# Patient Record
Sex: MALE | Race: WHITE | NOT HISPANIC OR LATINO
[De-identification: names, ages, dates, MRNs, and addresses within clinical notes are randomized per-mention and may not be internally consistent; named-entity substitution may affect disease eponyms.]

---

## 2017-06-12 ENCOUNTER — APPOINTMENT (OUTPATIENT)
Dept: CARDIOLOGY | Facility: CLINIC | Age: 67
End: 2017-06-12

## 2017-06-12 ENCOUNTER — NON-APPOINTMENT (OUTPATIENT)
Age: 67
End: 2017-06-12

## 2017-06-12 VITALS
BODY MASS INDEX: 30.48 KG/M2 | HEART RATE: 62 BPM | HEIGHT: 72 IN | WEIGHT: 225 LBS | SYSTOLIC BLOOD PRESSURE: 126 MMHG | RESPIRATION RATE: 14 BRPM | DIASTOLIC BLOOD PRESSURE: 62 MMHG

## 2017-06-13 LAB
ALBUMIN SERPL ELPH-MCNC: 4.7 G/DL
ALP BLD-CCNC: 37 U/L
ALT SERPL-CCNC: 20 U/L
ANION GAP SERPL CALC-SCNC: 15 MMOL/L
AST SERPL-CCNC: 17 U/L
BASOPHILS # BLD AUTO: 0.01 K/UL
BASOPHILS NFR BLD AUTO: 0.1 %
BILIRUB SERPL-MCNC: 0.5 MG/DL
BUN SERPL-MCNC: 20 MG/DL
CALCIUM SERPL-MCNC: 9.4 MG/DL
CHLORIDE SERPL-SCNC: 104 MMOL/L
CHOLEST SERPL-MCNC: 172 MG/DL
CHOLEST/HDLC SERPL: 3.7 RATIO
CO2 SERPL-SCNC: 23 MMOL/L
CREAT SERPL-MCNC: 0.95 MG/DL
EOSINOPHIL # BLD AUTO: 0.12 K/UL
EOSINOPHIL NFR BLD AUTO: 1.5 %
GLUCOSE SERPL-MCNC: 89 MG/DL
HCT VFR BLD CALC: 43 %
HDLC SERPL-MCNC: 47 MG/DL
HGB BLD-MCNC: 14.3 G/DL
IMM GRANULOCYTES NFR BLD AUTO: 0 %
LDLC SERPL CALC-MCNC: 86 MG/DL
LDLC SERPL DIRECT ASSAY-MCNC: 106 MG/DL
LYMPHOCYTES # BLD AUTO: 2.5 K/UL
LYMPHOCYTES NFR BLD AUTO: 30.3 %
MAN DIFF?: NORMAL
MCHC RBC-ENTMCNC: 30.9 PG
MCHC RBC-ENTMCNC: 33.3 GM/DL
MCV RBC AUTO: 92.9 FL
MONOCYTES # BLD AUTO: 0.69 K/UL
MONOCYTES NFR BLD AUTO: 8.4 %
NEUTROPHILS # BLD AUTO: 4.93 K/UL
NEUTROPHILS NFR BLD AUTO: 59.7 %
PLATELET # BLD AUTO: 222 K/UL
POTASSIUM SERPL-SCNC: 4 MMOL/L
PROT SERPL-MCNC: 6.9 G/DL
RBC # BLD: 4.63 M/UL
RBC # FLD: 13.9 %
SODIUM SERPL-SCNC: 142 MMOL/L
TRIGL SERPL-MCNC: 193 MG/DL
WBC # FLD AUTO: 8.25 K/UL

## 2017-06-30 ENCOUNTER — APPOINTMENT (OUTPATIENT)
Dept: CARDIOTHORACIC SURGERY | Facility: CLINIC | Age: 67
End: 2017-06-30

## 2017-06-30 VITALS
DIASTOLIC BLOOD PRESSURE: 82 MMHG | RESPIRATION RATE: 14 BRPM | TEMPERATURE: 97.7 F | SYSTOLIC BLOOD PRESSURE: 161 MMHG | HEART RATE: 80 BPM | OXYGEN SATURATION: 96 %

## 2017-06-30 VITALS — WEIGHT: 222 LBS | HEIGHT: 72 IN | BODY MASS INDEX: 30.07 KG/M2

## 2017-08-25 ENCOUNTER — APPOINTMENT (OUTPATIENT)
Dept: CARDIOLOGY | Facility: CLINIC | Age: 67
End: 2017-08-25

## 2017-10-26 ENCOUNTER — APPOINTMENT (OUTPATIENT)
Dept: CARDIOLOGY | Facility: CLINIC | Age: 67
End: 2017-10-26
Payer: COMMERCIAL

## 2017-10-26 ENCOUNTER — NON-APPOINTMENT (OUTPATIENT)
Age: 67
End: 2017-10-26

## 2017-10-26 VITALS
HEIGHT: 72 IN | TEMPERATURE: 98.3 F | BODY MASS INDEX: 31.02 KG/M2 | HEART RATE: 61 BPM | OXYGEN SATURATION: 96 % | SYSTOLIC BLOOD PRESSURE: 160 MMHG | DIASTOLIC BLOOD PRESSURE: 75 MMHG | WEIGHT: 229 LBS

## 2017-10-26 PROCEDURE — 93000 ELECTROCARDIOGRAM COMPLETE: CPT

## 2017-10-26 PROCEDURE — 90471 IMMUNIZATION ADMIN: CPT

## 2017-10-26 PROCEDURE — 90662 IIV NO PRSV INCREASED AG IM: CPT

## 2017-10-26 PROCEDURE — 36415 COLL VENOUS BLD VENIPUNCTURE: CPT

## 2017-10-26 PROCEDURE — 99215 OFFICE O/P EST HI 40 MIN: CPT

## 2017-10-27 LAB
ALBUMIN SERPL ELPH-MCNC: 4.4 G/DL
ALP BLD-CCNC: 41 U/L
ALT SERPL-CCNC: 20 U/L
ANION GAP SERPL CALC-SCNC: 11 MMOL/L
AST SERPL-CCNC: 22 U/L
BASOPHILS # BLD AUTO: 0.02 K/UL
BASOPHILS NFR BLD AUTO: 0.2 %
BILIRUB SERPL-MCNC: 0.4 MG/DL
BUN SERPL-MCNC: 20 MG/DL
CALCIUM SERPL-MCNC: 9.6 MG/DL
CHLORIDE SERPL-SCNC: 101 MMOL/L
CHOLEST SERPL-MCNC: 154 MG/DL
CHOLEST/HDLC SERPL: 4.5 RATIO
CO2 SERPL-SCNC: 29 MMOL/L
CREAT SERPL-MCNC: 0.76 MG/DL
EOSINOPHIL # BLD AUTO: 0.19 K/UL
EOSINOPHIL NFR BLD AUTO: 2.1 %
GLUCOSE SERPL-MCNC: 93 MG/DL
HCT VFR BLD CALC: 45 %
HDLC SERPL-MCNC: 34 MG/DL
HGB BLD-MCNC: 14.9 G/DL
IMM GRANULOCYTES NFR BLD AUTO: 0.1 %
LDLC SERPL CALC-MCNC: 84 MG/DL
LDLC SERPL DIRECT ASSAY-MCNC: 90 MG/DL
LYMPHOCYTES # BLD AUTO: 2.83 K/UL
LYMPHOCYTES NFR BLD AUTO: 31.1 %
MAN DIFF?: NORMAL
MCHC RBC-ENTMCNC: 30.5 PG
MCHC RBC-ENTMCNC: 33.1 GM/DL
MCV RBC AUTO: 92.2 FL
MONOCYTES # BLD AUTO: 0.73 K/UL
MONOCYTES NFR BLD AUTO: 8 %
NEUTROPHILS # BLD AUTO: 5.33 K/UL
NEUTROPHILS NFR BLD AUTO: 58.5 %
PLATELET # BLD AUTO: 248 K/UL
POTASSIUM SERPL-SCNC: 5 MMOL/L
PROT SERPL-MCNC: 7.9 G/DL
RBC # BLD: 4.88 M/UL
RBC # FLD: 13.5 %
SODIUM SERPL-SCNC: 141 MMOL/L
TRIGL SERPL-MCNC: 181 MG/DL
WBC # FLD AUTO: 9.11 K/UL

## 2018-01-03 ENCOUNTER — APPOINTMENT (OUTPATIENT)
Dept: CT IMAGING | Facility: IMAGING CENTER | Age: 68
End: 2018-01-03
Payer: COMMERCIAL

## 2018-01-03 ENCOUNTER — OUTPATIENT (OUTPATIENT)
Dept: OUTPATIENT SERVICES | Facility: HOSPITAL | Age: 68
LOS: 1 days | End: 2018-01-03
Payer: COMMERCIAL

## 2018-01-03 DIAGNOSIS — I71.2 THORACIC AORTIC ANEURYSM, WITHOUT RUPTURE: ICD-10-CM

## 2018-01-03 PROCEDURE — 71275 CT ANGIOGRAPHY CHEST: CPT

## 2018-01-03 PROCEDURE — 82565 ASSAY OF CREATININE: CPT

## 2018-01-03 PROCEDURE — 71275 CT ANGIOGRAPHY CHEST: CPT | Mod: 26

## 2018-01-17 ENCOUNTER — OUTPATIENT (OUTPATIENT)
Dept: OUTPATIENT SERVICES | Facility: HOSPITAL | Age: 68
LOS: 1 days | End: 2018-01-17
Payer: COMMERCIAL

## 2018-01-17 VITALS
TEMPERATURE: 98 F | HEIGHT: 70 IN | WEIGHT: 231.04 LBS | OXYGEN SATURATION: 98 % | HEART RATE: 56 BPM | SYSTOLIC BLOOD PRESSURE: 158 MMHG | RESPIRATION RATE: 16 BRPM | DIASTOLIC BLOOD PRESSURE: 79 MMHG

## 2018-01-17 DIAGNOSIS — I35.0 NONRHEUMATIC AORTIC (VALVE) STENOSIS: ICD-10-CM

## 2018-01-17 DIAGNOSIS — Z01.818 ENCOUNTER FOR OTHER PREPROCEDURAL EXAMINATION: ICD-10-CM

## 2018-01-17 DIAGNOSIS — I10 ESSENTIAL (PRIMARY) HYPERTENSION: ICD-10-CM

## 2018-01-17 DIAGNOSIS — E78.5 HYPERLIPIDEMIA, UNSPECIFIED: ICD-10-CM

## 2018-01-17 LAB
BLD GP AB SCN SERPL QL: NEGATIVE — SIGNIFICANT CHANGE UP
RH IG SCN BLD-IMP: NEGATIVE — SIGNIFICANT CHANGE UP

## 2018-01-17 PROCEDURE — 80053 COMPREHEN METABOLIC PANEL: CPT

## 2018-01-17 PROCEDURE — 86901 BLOOD TYPING SEROLOGIC RH(D): CPT

## 2018-01-17 PROCEDURE — G0463: CPT

## 2018-01-17 PROCEDURE — 87641 MR-STAPH DNA AMP PROBE: CPT

## 2018-01-17 PROCEDURE — 93005 ELECTROCARDIOGRAM TRACING: CPT

## 2018-01-17 PROCEDURE — 83036 HEMOGLOBIN GLYCOSYLATED A1C: CPT

## 2018-01-17 PROCEDURE — 87640 STAPH A DNA AMP PROBE: CPT

## 2018-01-17 PROCEDURE — 86900 BLOOD TYPING SEROLOGIC ABO: CPT

## 2018-01-17 PROCEDURE — 86850 RBC ANTIBODY SCREEN: CPT

## 2018-01-17 PROCEDURE — 71046 X-RAY EXAM CHEST 2 VIEWS: CPT

## 2018-01-17 PROCEDURE — 93010 ELECTROCARDIOGRAM REPORT: CPT

## 2018-01-17 PROCEDURE — 71046 X-RAY EXAM CHEST 2 VIEWS: CPT | Mod: 26

## 2018-01-17 PROCEDURE — 85027 COMPLETE CBC AUTOMATED: CPT

## 2018-01-17 NOTE — H&P PST ADULT - PMH
Aneurysm of ascending aorta  being by PMD 10 years ago size stays same as per patient  Aortic valve insufficiency, unspecified etiology  scheduled for surgery  Colon polyp    Essential hypertension    Hyperlipidemia, unspecified hyperlipidemia type    LVE (left ventricular enlargement)

## 2018-01-17 NOTE — H&P PST ADULT - PROBLEM SELECTOR PLAN 1
aortic valve replacement   Pst instruction given with antibacterial soap 3 days preop , patient and patient wife verbalizes understanding, incentive spirometer given to practice preop

## 2018-01-17 NOTE — H&P PST ADULT - NSANTHOSAYNRD_GEN_A_CORE
No. DAISY screening performed.  STOP BANG Legend: 0-2 = LOW Risk; 3-4 = INTERMEDIATE Risk; 5-8 = HIGH Risk

## 2018-01-17 NOTE — H&P PST ADULT - HISTORY OF PRESENT ILLNESS
65 y/o male PMH HTN, HLD, aortic valve regurgitation (mod to severe), aortic root dilatation, former smoker, Came for PST today for aortic valve replacement.. Pt s has history of aortic aneurysm and was being follow-up by  cardiologist and recently was advised to undergo surgery for prevention of  further complication  of  valve disorder

## 2018-01-18 ENCOUNTER — CLINICAL ADVICE (OUTPATIENT)
Age: 68
End: 2018-01-18

## 2018-01-18 LAB
ALBUMIN SERPL ELPH-MCNC: 4.4 G/DL — SIGNIFICANT CHANGE UP (ref 3.3–5)
ALP SERPL-CCNC: 40 U/L — SIGNIFICANT CHANGE UP (ref 40–120)
ALT FLD-CCNC: 18 U/L — SIGNIFICANT CHANGE UP (ref 10–45)
ANION GAP SERPL CALC-SCNC: 12 MMOL/L — SIGNIFICANT CHANGE UP (ref 5–17)
AST SERPL-CCNC: 21 U/L — SIGNIFICANT CHANGE UP (ref 10–40)
BILIRUB SERPL-MCNC: 0.4 MG/DL — SIGNIFICANT CHANGE UP (ref 0.2–1.2)
BUN SERPL-MCNC: 16 MG/DL — SIGNIFICANT CHANGE UP (ref 7–23)
CALCIUM SERPL-MCNC: 9.2 MG/DL — SIGNIFICANT CHANGE UP (ref 8.4–10.5)
CHLORIDE SERPL-SCNC: 101 MMOL/L — SIGNIFICANT CHANGE UP (ref 96–108)
CO2 SERPL-SCNC: 29 MMOL/L — SIGNIFICANT CHANGE UP (ref 22–31)
CREAT SERPL-MCNC: 0.72 MG/DL — SIGNIFICANT CHANGE UP (ref 0.5–1.3)
GLUCOSE SERPL-MCNC: 84 MG/DL — SIGNIFICANT CHANGE UP (ref 70–99)
HBA1C BLD-MCNC: 5.8 % — HIGH (ref 4–5.6)
HCT VFR BLD CALC: 44.3 % — SIGNIFICANT CHANGE UP (ref 39–50)
HGB BLD-MCNC: 14.6 G/DL — SIGNIFICANT CHANGE UP (ref 13–17)
MCHC RBC-ENTMCNC: 30.2 PG — SIGNIFICANT CHANGE UP (ref 27–34)
MCHC RBC-ENTMCNC: 33 GM/DL — SIGNIFICANT CHANGE UP (ref 32–36)
MCV RBC AUTO: 91.5 FL — SIGNIFICANT CHANGE UP (ref 80–100)
MRSA PCR RESULT.: SIGNIFICANT CHANGE UP
PLATELET # BLD AUTO: 220 K/UL — SIGNIFICANT CHANGE UP (ref 150–400)
POTASSIUM SERPL-MCNC: 4.3 MMOL/L — SIGNIFICANT CHANGE UP (ref 3.5–5.3)
POTASSIUM SERPL-SCNC: 4.3 MMOL/L — SIGNIFICANT CHANGE UP (ref 3.5–5.3)
PROT SERPL-MCNC: 7.8 G/DL — SIGNIFICANT CHANGE UP (ref 6–8.3)
RBC # BLD: 4.84 M/UL — SIGNIFICANT CHANGE UP (ref 4.2–5.8)
RBC # FLD: 13.3 % — SIGNIFICANT CHANGE UP (ref 10.3–14.5)
S AUREUS DNA NOSE QL NAA+PROBE: DETECTED
SODIUM SERPL-SCNC: 142 MMOL/L — SIGNIFICANT CHANGE UP (ref 135–145)
WBC # BLD: 8.59 K/UL — SIGNIFICANT CHANGE UP (ref 3.8–10.5)
WBC # FLD AUTO: 8.59 K/UL — SIGNIFICANT CHANGE UP (ref 3.8–10.5)

## 2018-01-22 ENCOUNTER — APPOINTMENT (OUTPATIENT)
Dept: CARDIOTHORACIC SURGERY | Facility: HOSPITAL | Age: 68
End: 2018-01-22
Payer: COMMERCIAL

## 2018-01-22 ENCOUNTER — RESULT REVIEW (OUTPATIENT)
Age: 68
End: 2018-01-22

## 2018-01-22 ENCOUNTER — INPATIENT (INPATIENT)
Facility: HOSPITAL | Age: 68
LOS: 4 days | Discharge: HOME HEALTH | DRG: 221 | End: 2018-01-27
Attending: THORACIC SURGERY (CARDIOTHORACIC VASCULAR SURGERY) | Admitting: THORACIC SURGERY (CARDIOTHORACIC VASCULAR SURGERY)
Payer: COMMERCIAL

## 2018-01-22 VITALS
HEART RATE: 59 BPM | TEMPERATURE: 98 F | DIASTOLIC BLOOD PRESSURE: 71 MMHG | SYSTOLIC BLOOD PRESSURE: 179 MMHG | OXYGEN SATURATION: 97 % | RESPIRATION RATE: 16 BRPM | WEIGHT: 227.08 LBS | HEIGHT: 70 IN

## 2018-01-22 DIAGNOSIS — I35.0 NONRHEUMATIC AORTIC (VALVE) STENOSIS: ICD-10-CM

## 2018-01-22 LAB
ALBUMIN SERPL ELPH-MCNC: 3.7 G/DL — SIGNIFICANT CHANGE UP (ref 3.3–5)
ALP SERPL-CCNC: 25 U/L — LOW (ref 40–120)
ALT FLD-CCNC: 14 U/L RC — SIGNIFICANT CHANGE UP (ref 10–45)
ANION GAP SERPL CALC-SCNC: 11 MMOL/L — SIGNIFICANT CHANGE UP (ref 5–17)
APTT BLD: 32.3 SEC — SIGNIFICANT CHANGE UP (ref 27.5–37.4)
AST SERPL-CCNC: 30 U/L — SIGNIFICANT CHANGE UP (ref 10–40)
BASOPHILS # BLD AUTO: 0 K/UL — SIGNIFICANT CHANGE UP (ref 0–0.2)
BASOPHILS NFR BLD AUTO: 0 % — SIGNIFICANT CHANGE UP (ref 0–2)
BILIRUB SERPL-MCNC: 0.8 MG/DL — SIGNIFICANT CHANGE UP (ref 0.2–1.2)
BUN SERPL-MCNC: 18 MG/DL — SIGNIFICANT CHANGE UP (ref 7–23)
CALCIUM SERPL-MCNC: 8.4 MG/DL — SIGNIFICANT CHANGE UP (ref 8.4–10.5)
CHLORIDE SERPL-SCNC: 107 MMOL/L — SIGNIFICANT CHANGE UP (ref 96–108)
CK MB BLD-MCNC: 11.6 % — HIGH (ref 0–3.5)
CK MB CFR SERPL CALC: 22.2 NG/ML — HIGH (ref 0–6.7)
CK SERPL-CCNC: 191 U/L — SIGNIFICANT CHANGE UP (ref 30–200)
CO2 SERPL-SCNC: 24 MMOL/L — SIGNIFICANT CHANGE UP (ref 22–31)
CREAT SERPL-MCNC: 0.86 MG/DL — SIGNIFICANT CHANGE UP (ref 0.5–1.3)
EOSINOPHIL # BLD AUTO: 0.1 K/UL — SIGNIFICANT CHANGE UP (ref 0–0.5)
EOSINOPHIL NFR BLD AUTO: 0 % — SIGNIFICANT CHANGE UP (ref 0–6)
GAS PNL BLDA: SIGNIFICANT CHANGE UP
GLUCOSE SERPL-MCNC: 142 MG/DL — HIGH (ref 70–99)
HCT VFR BLD CALC: 37.1 % — LOW (ref 39–50)
HGB BLD-MCNC: 12.6 G/DL — LOW (ref 13–17)
INR BLD: 1 RATIO — SIGNIFICANT CHANGE UP (ref 0.88–1.16)
LYMPHOCYTES # BLD AUTO: 1.7 K/UL — SIGNIFICANT CHANGE UP (ref 1–3.3)
LYMPHOCYTES # BLD AUTO: 15 % — SIGNIFICANT CHANGE UP (ref 13–44)
MCHC RBC-ENTMCNC: 31.1 PG — SIGNIFICANT CHANGE UP (ref 27–34)
MCHC RBC-ENTMCNC: 34.1 GM/DL — SIGNIFICANT CHANGE UP (ref 32–36)
MCV RBC AUTO: 91.4 FL — SIGNIFICANT CHANGE UP (ref 80–100)
MONOCYTES # BLD AUTO: 0.7 K/UL — SIGNIFICANT CHANGE UP (ref 0–0.9)
MONOCYTES NFR BLD AUTO: 6 % — SIGNIFICANT CHANGE UP (ref 2–14)
NEUTROPHILS # BLD AUTO: 10.2 K/UL — HIGH (ref 1.8–7.4)
NEUTROPHILS NFR BLD AUTO: 76 % — SIGNIFICANT CHANGE UP (ref 43–77)
NEUTS BAND # BLD: 3 % — SIGNIFICANT CHANGE UP (ref 0–8)
PLAT MORPH BLD: NORMAL — SIGNIFICANT CHANGE UP
PLATELET # BLD AUTO: 118 K/UL — LOW (ref 150–400)
POTASSIUM SERPL-MCNC: 4.1 MMOL/L — SIGNIFICANT CHANGE UP (ref 3.5–5.3)
POTASSIUM SERPL-SCNC: 4.1 MMOL/L — SIGNIFICANT CHANGE UP (ref 3.5–5.3)
PROT SERPL-MCNC: 5.7 G/DL — LOW (ref 6–8.3)
PROTHROM AB SERPL-ACNC: 10.9 SEC — SIGNIFICANT CHANGE UP (ref 9.8–12.7)
RBC # BLD: 4.06 M/UL — LOW (ref 4.2–5.8)
RBC # FLD: 11.4 % — SIGNIFICANT CHANGE UP (ref 10.3–14.5)
RBC BLD AUTO: NORMAL — SIGNIFICANT CHANGE UP
RH IG SCN BLD-IMP: NEGATIVE — SIGNIFICANT CHANGE UP
SODIUM SERPL-SCNC: 142 MMOL/L — SIGNIFICANT CHANGE UP (ref 135–145)
TROPONIN T SERPL-MCNC: 0.15 NG/ML — HIGH (ref 0–0.06)
WBC # BLD: 12.7 K/UL — HIGH (ref 3.8–10.5)
WBC # FLD AUTO: 12.7 K/UL — HIGH (ref 3.8–10.5)

## 2018-01-22 PROCEDURE — 88311 DECALCIFY TISSUE: CPT | Mod: 26

## 2018-01-22 PROCEDURE — 33863 ASCENDING AORTIC GRAFT: CPT | Mod: AS

## 2018-01-22 PROCEDURE — 88305 TISSUE EXAM BY PATHOLOGIST: CPT | Mod: 26

## 2018-01-22 PROCEDURE — 33863 ASCENDING AORTIC GRAFT: CPT

## 2018-01-22 PROCEDURE — 88304 TISSUE EXAM BY PATHOLOGIST: CPT | Mod: 26

## 2018-01-22 PROCEDURE — 71045 X-RAY EXAM CHEST 1 VIEW: CPT | Mod: 26

## 2018-01-22 PROCEDURE — 93010 ELECTROCARDIOGRAM REPORT: CPT

## 2018-01-22 RX ORDER — DOCUSATE SODIUM 100 MG
100 CAPSULE ORAL THREE TIMES A DAY
Qty: 0 | Refills: 0 | Status: DISCONTINUED | OUTPATIENT
Start: 2018-01-22 | End: 2018-01-27

## 2018-01-22 RX ORDER — OXYCODONE AND ACETAMINOPHEN 5; 325 MG/1; MG/1
1 TABLET ORAL EVERY 6 HOURS
Qty: 0 | Refills: 0 | Status: DISCONTINUED | OUTPATIENT
Start: 2018-01-22 | End: 2018-01-23

## 2018-01-22 RX ORDER — PANTOPRAZOLE SODIUM 20 MG/1
40 TABLET, DELAYED RELEASE ORAL DAILY
Qty: 0 | Refills: 0 | Status: DISCONTINUED | OUTPATIENT
Start: 2018-01-22 | End: 2018-01-23

## 2018-01-22 RX ORDER — CEFUROXIME AXETIL 250 MG
1500 TABLET ORAL EVERY 8 HOURS
Qty: 0 | Refills: 0 | Status: COMPLETED | OUTPATIENT
Start: 2018-01-22 | End: 2018-01-24

## 2018-01-22 RX ORDER — SODIUM CHLORIDE 9 MG/ML
1000 INJECTION INTRAMUSCULAR; INTRAVENOUS; SUBCUTANEOUS
Qty: 0 | Refills: 0 | Status: DISCONTINUED | OUTPATIENT
Start: 2018-01-22 | End: 2018-01-27

## 2018-01-22 RX ORDER — DEXMEDETOMIDINE HYDROCHLORIDE IN 0.9% SODIUM CHLORIDE 4 UG/ML
0.5 INJECTION INTRAVENOUS
Qty: 200 | Refills: 0 | Status: DISCONTINUED | OUTPATIENT
Start: 2018-01-22 | End: 2018-01-23

## 2018-01-22 RX ORDER — LIDOCAINE HCL 20 MG/ML
0.2 VIAL (ML) INJECTION ONCE
Qty: 0 | Refills: 0 | Status: DISCONTINUED | OUTPATIENT
Start: 2018-01-22 | End: 2018-01-22

## 2018-01-22 RX ORDER — SODIUM CHLORIDE 9 MG/ML
1000 INJECTION, SOLUTION INTRAVENOUS
Qty: 0 | Refills: 0 | Status: DISCONTINUED | OUTPATIENT
Start: 2018-01-22 | End: 2018-01-23

## 2018-01-22 RX ORDER — POTASSIUM CHLORIDE 20 MEQ
10 PACKET (EA) ORAL ONCE
Qty: 0 | Refills: 0 | Status: DISCONTINUED | OUTPATIENT
Start: 2018-01-22 | End: 2018-01-23

## 2018-01-22 RX ORDER — ASPIRIN/CALCIUM CARB/MAGNESIUM 324 MG
325 TABLET ORAL DAILY
Qty: 0 | Refills: 0 | Status: DISCONTINUED | OUTPATIENT
Start: 2018-01-22 | End: 2018-01-22

## 2018-01-22 RX ORDER — POTASSIUM CHLORIDE 20 MEQ
10 PACKET (EA) ORAL
Qty: 0 | Refills: 0 | Status: DISCONTINUED | OUTPATIENT
Start: 2018-01-22 | End: 2018-01-23

## 2018-01-22 RX ORDER — ALBUMIN HUMAN 25 %
250 VIAL (ML) INTRAVENOUS
Qty: 0 | Refills: 0 | Status: DISCONTINUED | OUTPATIENT
Start: 2018-01-22 | End: 2018-01-23

## 2018-01-22 RX ORDER — DEXTROSE 50 % IN WATER 50 %
25 SYRINGE (ML) INTRAVENOUS
Qty: 0 | Refills: 0 | Status: DISCONTINUED | OUTPATIENT
Start: 2018-01-22 | End: 2018-01-27

## 2018-01-22 RX ORDER — INSULIN HUMAN 100 [IU]/ML
2 INJECTION, SOLUTION SUBCUTANEOUS
Qty: 100 | Refills: 0 | Status: DISCONTINUED | OUTPATIENT
Start: 2018-01-22 | End: 2018-01-23

## 2018-01-22 RX ORDER — METOCLOPRAMIDE HCL 10 MG
10 TABLET ORAL EVERY 8 HOURS
Qty: 0 | Refills: 0 | Status: COMPLETED | OUTPATIENT
Start: 2018-01-22 | End: 2018-01-23

## 2018-01-22 RX ORDER — OXYCODONE AND ACETAMINOPHEN 5; 325 MG/1; MG/1
2 TABLET ORAL EVERY 6 HOURS
Qty: 0 | Refills: 0 | Status: DISCONTINUED | OUTPATIENT
Start: 2018-01-22 | End: 2018-01-23

## 2018-01-22 RX ORDER — VANCOMYCIN HCL 1 G
1000 VIAL (EA) INTRAVENOUS EVERY 12 HOURS
Qty: 0 | Refills: 0 | Status: COMPLETED | OUTPATIENT
Start: 2018-01-22 | End: 2018-01-23

## 2018-01-22 RX ORDER — CEFUROXIME AXETIL 250 MG
1500 TABLET ORAL ONCE
Qty: 0 | Refills: 0 | Status: COMPLETED | OUTPATIENT
Start: 2018-01-22 | End: 2018-01-22

## 2018-01-22 RX ORDER — DOBUTAMINE HCL 250MG/20ML
2.5 VIAL (ML) INTRAVENOUS
Qty: 500 | Refills: 0 | Status: DISCONTINUED | OUTPATIENT
Start: 2018-01-22 | End: 2018-01-23

## 2018-01-22 RX ORDER — KETOROLAC TROMETHAMINE 30 MG/ML
30 SYRINGE (ML) INJECTION EVERY 8 HOURS
Qty: 0 | Refills: 0 | Status: DISCONTINUED | OUTPATIENT
Start: 2018-01-22 | End: 2018-01-24

## 2018-01-22 RX ORDER — VANCOMYCIN HCL 1 G
1000 VIAL (EA) INTRAVENOUS EVERY 12 HOURS
Qty: 0 | Refills: 0 | Status: DISCONTINUED | OUTPATIENT
Start: 2018-01-22 | End: 2018-01-22

## 2018-01-22 RX ORDER — SODIUM CHLORIDE 9 MG/ML
500 INJECTION, SOLUTION INTRAVENOUS ONCE
Qty: 0 | Refills: 0 | Status: COMPLETED | OUTPATIENT
Start: 2018-01-22 | End: 2018-01-22

## 2018-01-22 RX ORDER — ALBUMIN HUMAN 25 %
250 VIAL (ML) INTRAVENOUS
Qty: 0 | Refills: 0 | Status: COMPLETED | OUTPATIENT
Start: 2018-01-22 | End: 2018-01-22

## 2018-01-22 RX ORDER — HYDROMORPHONE HYDROCHLORIDE 2 MG/ML
0.5 INJECTION INTRAMUSCULAR; INTRAVENOUS; SUBCUTANEOUS ONCE
Qty: 0 | Refills: 0 | Status: DISCONTINUED | OUTPATIENT
Start: 2018-01-22 | End: 2018-01-22

## 2018-01-22 RX ORDER — DEXTROSE 50 % IN WATER 50 %
50 SYRINGE (ML) INTRAVENOUS
Qty: 0 | Refills: 0 | Status: DISCONTINUED | OUTPATIENT
Start: 2018-01-22 | End: 2018-01-27

## 2018-01-22 RX ORDER — MEPERIDINE HYDROCHLORIDE 50 MG/ML
25 INJECTION INTRAMUSCULAR; INTRAVENOUS; SUBCUTANEOUS ONCE
Qty: 0 | Refills: 0 | Status: DISCONTINUED | OUTPATIENT
Start: 2018-01-22 | End: 2018-01-23

## 2018-01-22 RX ORDER — SODIUM CHLORIDE 9 MG/ML
3 INJECTION INTRAMUSCULAR; INTRAVENOUS; SUBCUTANEOUS EVERY 8 HOURS
Qty: 0 | Refills: 0 | Status: DISCONTINUED | OUTPATIENT
Start: 2018-01-22 | End: 2018-01-22

## 2018-01-22 RX ORDER — ALBUMIN HUMAN 25 %
500 VIAL (ML) INTRAVENOUS ONCE
Qty: 0 | Refills: 0 | Status: COMPLETED | OUTPATIENT
Start: 2018-01-22 | End: 2018-01-22

## 2018-01-22 RX ADMIN — Medication 500 MILLILITER(S): at 18:59

## 2018-01-22 RX ADMIN — SODIUM CHLORIDE 30 MILLILITER(S): 9 INJECTION, SOLUTION INTRAVENOUS at 16:03

## 2018-01-22 RX ADMIN — Medication 500 MILLILITER(S): at 22:45

## 2018-01-22 RX ADMIN — SODIUM CHLORIDE 3000 MILLILITER(S): 9 INJECTION, SOLUTION INTRAVENOUS at 15:00

## 2018-01-22 RX ADMIN — Medication 10 MILLIGRAM(S): at 21:31

## 2018-01-22 RX ADMIN — Medication 250 MILLIGRAM(S): at 21:31

## 2018-01-22 RX ADMIN — Medication 30 MILLIGRAM(S): at 21:15

## 2018-01-22 RX ADMIN — INSULIN HUMAN 2 UNIT(S)/HR: 100 INJECTION, SOLUTION SUBCUTANEOUS at 14:45

## 2018-01-22 RX ADMIN — Medication 7.72 MICROGRAM(S)/KG/MIN: at 21:18

## 2018-01-22 RX ADMIN — Medication 30 MILLIGRAM(S): at 21:30

## 2018-01-22 RX ADMIN — Medication 100 MILLIGRAM(S): at 20:00

## 2018-01-22 RX ADMIN — Medication 250 MILLILITER(S): at 18:22

## 2018-01-22 RX ADMIN — DEXMEDETOMIDINE HYDROCHLORIDE IN 0.9% SODIUM CHLORIDE 12.88 MICROGRAM(S)/KG/HR: 4 INJECTION INTRAVENOUS at 14:52

## 2018-01-22 RX ADMIN — DEXMEDETOMIDINE HYDROCHLORIDE IN 0.9% SODIUM CHLORIDE 12.88 MICROGRAM(S)/KG/HR: 4 INJECTION INTRAVENOUS at 21:18

## 2018-01-22 RX ADMIN — Medication 500 MILLILITER(S): at 20:30

## 2018-01-22 RX ADMIN — Medication 500 MILLILITER(S): at 22:00

## 2018-01-22 RX ADMIN — Medication 7.72 MICROGRAM(S)/KG/MIN: at 14:46

## 2018-01-22 RX ADMIN — Medication 500 MILLILITER(S): at 21:21

## 2018-01-22 NOTE — BRIEF OPERATIVE NOTE - PRE-OP DX
Aortic aneurysm without rupture, unspecified portion of aorta  01/22/2018    Active  Piter Champion  Aortic valve insufficiency, etiology of cardiac valve disease unspecified  01/22/2018    Active  Piter Champion

## 2018-01-22 NOTE — BRIEF OPERATIVE NOTE - PROCEDURE
<<-----Click on this checkbox to enter Procedure Aortic valve replacement, bioprosthetic  01/22/2018  AVR(bio) with aortic aneurysm repair  Active  [id number]  Aortic aneurysm repair  01/22/2018    Active  [id number]

## 2018-01-23 DIAGNOSIS — J95.89 OTHER POSTPROCEDURAL COMPLICATIONS AND DISORDERS OF RESPIRATORY SYSTEM, NOT ELSEWHERE CLASSIFIED: ICD-10-CM

## 2018-01-23 DIAGNOSIS — Z95.2 PRESENCE OF PROSTHETIC HEART VALVE: ICD-10-CM

## 2018-01-23 LAB
ALBUMIN SERPL ELPH-MCNC: 4.4 G/DL — SIGNIFICANT CHANGE UP (ref 3.3–5)
ALP SERPL-CCNC: 20 U/L — LOW (ref 40–120)
ALT FLD-CCNC: 13 U/L RC — SIGNIFICANT CHANGE UP (ref 10–45)
ANION GAP SERPL CALC-SCNC: 10 MMOL/L — SIGNIFICANT CHANGE UP (ref 5–17)
APTT BLD: 31.9 SEC — SIGNIFICANT CHANGE UP (ref 27.5–37.4)
AST SERPL-CCNC: 27 U/L — SIGNIFICANT CHANGE UP (ref 10–40)
BILIRUB SERPL-MCNC: 1.3 MG/DL — HIGH (ref 0.2–1.2)
BUN SERPL-MCNC: 18 MG/DL — SIGNIFICANT CHANGE UP (ref 7–23)
CALCIUM SERPL-MCNC: 8.2 MG/DL — LOW (ref 8.4–10.5)
CHLORIDE SERPL-SCNC: 106 MMOL/L — SIGNIFICANT CHANGE UP (ref 96–108)
CO2 SERPL-SCNC: 27 MMOL/L — SIGNIFICANT CHANGE UP (ref 22–31)
CREAT SERPL-MCNC: 0.79 MG/DL — SIGNIFICANT CHANGE UP (ref 0.5–1.3)
GAS PNL BLDA: SIGNIFICANT CHANGE UP
GLUCOSE SERPL-MCNC: 130 MG/DL — HIGH (ref 70–99)
HCT VFR BLD CALC: 33.9 % — LOW (ref 39–50)
HGB BLD-MCNC: 11.5 G/DL — LOW (ref 13–17)
INR BLD: 1.25 RATIO — HIGH (ref 0.88–1.16)
MCHC RBC-ENTMCNC: 31.1 PG — SIGNIFICANT CHANGE UP (ref 27–34)
MCHC RBC-ENTMCNC: 33.9 GM/DL — SIGNIFICANT CHANGE UP (ref 32–36)
MCV RBC AUTO: 91.8 FL — SIGNIFICANT CHANGE UP (ref 80–100)
PHOSPHATE SERPL-MCNC: 3.6 MG/DL — SIGNIFICANT CHANGE UP (ref 2.5–4.5)
PLATELET # BLD AUTO: 109 K/UL — LOW (ref 150–400)
POTASSIUM SERPL-MCNC: 4.3 MMOL/L — SIGNIFICANT CHANGE UP (ref 3.5–5.3)
POTASSIUM SERPL-SCNC: 4.3 MMOL/L — SIGNIFICANT CHANGE UP (ref 3.5–5.3)
PROT SERPL-MCNC: 6.2 G/DL — SIGNIFICANT CHANGE UP (ref 6–8.3)
PROTHROM AB SERPL-ACNC: 13.7 SEC — HIGH (ref 9.8–12.7)
RBC # BLD: 3.69 M/UL — LOW (ref 4.2–5.8)
RBC # FLD: 11.5 % — SIGNIFICANT CHANGE UP (ref 10.3–14.5)
SODIUM SERPL-SCNC: 143 MMOL/L — SIGNIFICANT CHANGE UP (ref 135–145)
TSH SERPL-MCNC: 0.45 UIU/ML — SIGNIFICANT CHANGE UP (ref 0.27–4.2)
WBC # BLD: 10.1 K/UL — SIGNIFICANT CHANGE UP (ref 3.8–10.5)
WBC # FLD AUTO: 10.1 K/UL — SIGNIFICANT CHANGE UP (ref 3.8–10.5)

## 2018-01-23 PROCEDURE — 93010 ELECTROCARDIOGRAM REPORT: CPT

## 2018-01-23 PROCEDURE — 99223 1ST HOSP IP/OBS HIGH 75: CPT

## 2018-01-23 PROCEDURE — 71045 X-RAY EXAM CHEST 1 VIEW: CPT | Mod: 26

## 2018-01-23 RX ORDER — OXYCODONE AND ACETAMINOPHEN 5; 325 MG/1; MG/1
2 TABLET ORAL EVERY 4 HOURS
Qty: 0 | Refills: 0 | Status: DISCONTINUED | OUTPATIENT
Start: 2018-01-23 | End: 2018-01-27

## 2018-01-23 RX ORDER — NICARDIPINE HYDROCHLORIDE 30 MG/1
3 CAPSULE, EXTENDED RELEASE ORAL
Qty: 40 | Refills: 0 | Status: DISCONTINUED | OUTPATIENT
Start: 2018-01-23 | End: 2018-01-23

## 2018-01-23 RX ORDER — ATORVASTATIN CALCIUM 80 MG/1
40 TABLET, FILM COATED ORAL AT BEDTIME
Qty: 0 | Refills: 0 | Status: DISCONTINUED | OUTPATIENT
Start: 2018-01-23 | End: 2018-01-27

## 2018-01-23 RX ORDER — HYDROMORPHONE HYDROCHLORIDE 2 MG/ML
0.5 INJECTION INTRAMUSCULAR; INTRAVENOUS; SUBCUTANEOUS ONCE
Qty: 0 | Refills: 0 | Status: DISCONTINUED | OUTPATIENT
Start: 2018-01-23 | End: 2018-01-23

## 2018-01-23 RX ORDER — HYDROMORPHONE HYDROCHLORIDE 2 MG/ML
1 INJECTION INTRAMUSCULAR; INTRAVENOUS; SUBCUTANEOUS ONCE
Qty: 0 | Refills: 0 | Status: DISCONTINUED | OUTPATIENT
Start: 2018-01-23 | End: 2018-01-23

## 2018-01-23 RX ORDER — HYDROMORPHONE HYDROCHLORIDE 2 MG/ML
0.5 INJECTION INTRAMUSCULAR; INTRAVENOUS; SUBCUTANEOUS EVERY 4 HOURS
Qty: 0 | Refills: 0 | Status: DISCONTINUED | OUTPATIENT
Start: 2018-01-23 | End: 2018-01-23

## 2018-01-23 RX ORDER — INSULIN LISPRO 100/ML
VIAL (ML) SUBCUTANEOUS AT BEDTIME
Qty: 0 | Refills: 0 | Status: DISCONTINUED | OUTPATIENT
Start: 2018-01-23 | End: 2018-01-27

## 2018-01-23 RX ORDER — INSULIN LISPRO 100/ML
VIAL (ML) SUBCUTANEOUS
Qty: 0 | Refills: 0 | Status: DISCONTINUED | OUTPATIENT
Start: 2018-01-23 | End: 2018-01-27

## 2018-01-23 RX ORDER — PANTOPRAZOLE SODIUM 20 MG/1
40 TABLET, DELAYED RELEASE ORAL
Qty: 0 | Refills: 0 | Status: DISCONTINUED | OUTPATIENT
Start: 2018-01-23 | End: 2018-01-27

## 2018-01-23 RX ORDER — HYDROMORPHONE HYDROCHLORIDE 2 MG/ML
1 INJECTION INTRAMUSCULAR; INTRAVENOUS; SUBCUTANEOUS EVERY 4 HOURS
Qty: 0 | Refills: 0 | Status: DISCONTINUED | OUTPATIENT
Start: 2018-01-23 | End: 2018-01-24

## 2018-01-23 RX ORDER — ENOXAPARIN SODIUM 100 MG/ML
40 INJECTION SUBCUTANEOUS DAILY
Qty: 0 | Refills: 0 | Status: DISCONTINUED | OUTPATIENT
Start: 2018-01-23 | End: 2018-01-27

## 2018-01-23 RX ORDER — METOPROLOL TARTRATE 50 MG
25 TABLET ORAL EVERY 8 HOURS
Qty: 0 | Refills: 0 | Status: DISCONTINUED | OUTPATIENT
Start: 2018-01-23 | End: 2018-01-24

## 2018-01-23 RX ORDER — ASPIRIN/CALCIUM CARB/MAGNESIUM 324 MG
325 TABLET ORAL DAILY
Qty: 0 | Refills: 0 | Status: DISCONTINUED | OUTPATIENT
Start: 2018-01-23 | End: 2018-01-27

## 2018-01-23 RX ADMIN — Medication 25 MILLIGRAM(S): at 06:18

## 2018-01-23 RX ADMIN — Medication 30 MILLIGRAM(S): at 05:15

## 2018-01-23 RX ADMIN — Medication 30 MILLIGRAM(S): at 13:15

## 2018-01-23 RX ADMIN — ENOXAPARIN SODIUM 40 MILLIGRAM(S): 100 INJECTION SUBCUTANEOUS at 20:09

## 2018-01-23 RX ADMIN — Medication 10 MILLIGRAM(S): at 05:08

## 2018-01-23 RX ADMIN — HYDROMORPHONE HYDROCHLORIDE 0.5 MILLIGRAM(S): 2 INJECTION INTRAMUSCULAR; INTRAVENOUS; SUBCUTANEOUS at 06:00

## 2018-01-23 RX ADMIN — HYDROMORPHONE HYDROCHLORIDE 0.5 MILLIGRAM(S): 2 INJECTION INTRAMUSCULAR; INTRAVENOUS; SUBCUTANEOUS at 01:31

## 2018-01-23 RX ADMIN — HYDROMORPHONE HYDROCHLORIDE 0.5 MILLIGRAM(S): 2 INJECTION INTRAMUSCULAR; INTRAVENOUS; SUBCUTANEOUS at 03:15

## 2018-01-23 RX ADMIN — Medication 30 MILLIGRAM(S): at 05:00

## 2018-01-23 RX ADMIN — NICARDIPINE HYDROCHLORIDE 15 MG/HR: 30 CAPSULE, EXTENDED RELEASE ORAL at 05:08

## 2018-01-23 RX ADMIN — Medication 250 MILLIGRAM(S): at 20:44

## 2018-01-23 RX ADMIN — ATORVASTATIN CALCIUM 40 MILLIGRAM(S): 80 TABLET, FILM COATED ORAL at 21:18

## 2018-01-23 RX ADMIN — HYDROMORPHONE HYDROCHLORIDE 0.5 MILLIGRAM(S): 2 INJECTION INTRAMUSCULAR; INTRAVENOUS; SUBCUTANEOUS at 13:00

## 2018-01-23 RX ADMIN — Medication 250 MILLIGRAM(S): at 08:26

## 2018-01-23 RX ADMIN — HYDROMORPHONE HYDROCHLORIDE 0.5 MILLIGRAM(S): 2 INJECTION INTRAMUSCULAR; INTRAVENOUS; SUBCUTANEOUS at 08:45

## 2018-01-23 RX ADMIN — Medication 100 MILLIGRAM(S): at 21:18

## 2018-01-23 RX ADMIN — Medication 325 MILLIGRAM(S): at 12:39

## 2018-01-23 RX ADMIN — Medication 100 MILLIGRAM(S): at 20:07

## 2018-01-23 RX ADMIN — HYDROMORPHONE HYDROCHLORIDE 0.5 MILLIGRAM(S): 2 INJECTION INTRAMUSCULAR; INTRAVENOUS; SUBCUTANEOUS at 06:15

## 2018-01-23 RX ADMIN — HYDROMORPHONE HYDROCHLORIDE 1 MILLIGRAM(S): 2 INJECTION INTRAMUSCULAR; INTRAVENOUS; SUBCUTANEOUS at 20:00

## 2018-01-23 RX ADMIN — HYDROMORPHONE HYDROCHLORIDE 0.5 MILLIGRAM(S): 2 INJECTION INTRAMUSCULAR; INTRAVENOUS; SUBCUTANEOUS at 08:30

## 2018-01-23 RX ADMIN — Medication 100 MILLIGRAM(S): at 12:39

## 2018-01-23 RX ADMIN — Medication 25 MILLIGRAM(S): at 13:00

## 2018-01-23 RX ADMIN — Medication 30 MILLIGRAM(S): at 21:55

## 2018-01-23 RX ADMIN — Medication 100 MILLIGRAM(S): at 05:07

## 2018-01-23 RX ADMIN — Medication 30 MILLIGRAM(S): at 21:18

## 2018-01-23 RX ADMIN — Medication 30 MILLIGRAM(S): at 13:00

## 2018-01-23 RX ADMIN — Medication 25 MILLIGRAM(S): at 21:18

## 2018-01-23 RX ADMIN — HYDROMORPHONE HYDROCHLORIDE 1 MILLIGRAM(S): 2 INJECTION INTRAMUSCULAR; INTRAVENOUS; SUBCUTANEOUS at 20:15

## 2018-01-23 RX ADMIN — Medication 10 MILLIGRAM(S): at 13:00

## 2018-01-23 RX ADMIN — Medication 100 MILLIGRAM(S): at 04:00

## 2018-01-23 RX ADMIN — HYDROMORPHONE HYDROCHLORIDE 0.5 MILLIGRAM(S): 2 INJECTION INTRAMUSCULAR; INTRAVENOUS; SUBCUTANEOUS at 03:00

## 2018-01-23 NOTE — AIRWAY REMOVAL NOTE  ADULT & PEDS - ARTIFICAL AIRWAY REMOVAL COMMENTS
Written order for extubation verified. The patient was identified by full name and birth date compared to the identification band. Present during the procedure was Lizzie PARKER

## 2018-01-23 NOTE — PROGRESS NOTE ADULT - PROBLEM SELECTOR PLAN 2
ASA continued for thromboembolism prophylaxis  Zocor was also started for long term graft patency  Lovenox initiated for VTE prophylaxis in addition to Venodyne boots  Protonix maintained for GI bleeding prophylaxis  Lopressor continued for atrial fibrillation prophylaxis  Reviewed & addressed surgical site infection prophylaxis regimen

## 2018-01-23 NOTE — PROGRESS NOTE ADULT - SUBJECTIVE AND OBJECTIVE BOX
CHIEF COMPLAINT: Incision chest pain     HPI:  67 y/o male PMH HTN, HLD, aortic valve regurgitation (mod to severe), aortic root dilatation, former smoker, Came for PST today for aortic valve replacement.. Pt s has history of aortic aneurysm and was being follow-up by  cardiologist and recently was advised to undergo surgery for prevention of  further complication  of  valve disorder now S/P AVR-ascending aorta repair.    PAST MEDICAL & SURGICAL HISTORY:  Essential hypertension  LVE (left ventricular enlargement)  Hyperlipidemia, unspecified hyperlipidemia type  Colon polyp  Aneurysm of ascending aorta: being by PMD 10 years ago size stays same as per patient  No significant past surgical history      FAMILY HISTORY:  No pertinent family history in first degree relatives    Social History:    Marital Status:  X    (   ) Single    (   )    (  )   Occupation: Retired   Lives with: (  ) alone  (  ) children  X spouse   (  ) parents  (  ) other    Substance Use (street drugs): X never used  (  ) other:  Tobacco Usage: X never smoked   (   ) former smoker   (   ) current smoker  (     ) pack year  (    ) last cigarette date  Alcohol Usage: Social      OBJECTIVE:  Vital Signs Last 24 Hrs  T(C): 37.2 (2018 07:00), Max: 37.5 (2018 03:00)  T(F): 99 (2018 07:00), Max: 99.5 (2018 03:00)  HR: 67 (2018 08:00) (49 - 102)  BP: 118/65 (2018 07:00) (102/59 - 132/72)  BP(mean): 86 (2018 07:00) (76 - 96)  RR: 15 (2018 08:00) (10 - 77)  SpO2: 99% (2018 08:00) (95% - 100%)  Mode: CPAP with PS, FiO2: 50, PEEP: 5, PS: 10, MAP: 9     @ 07:  -   @ 07:00  --------------------------------------------------------  IN: 3966.1 mL / OUT: 4060 mL / NET: -93.9 mL     @ 07: @ 09:16  --------------------------------------------------------  IN: 10 mL / OUT: 65 mL / NET: -55 mL      HOSPITAL MEDICATIONS:   aspirin enteric coated 325 milliGRAM(s) Oral daily  atorvastatin 40 milliGRAM(s) Oral at bedtime  cefuroxime  IVPB 1500 milliGRAM(s) IV Intermittent every 8 hours  dextrose 50% Injectable 50 milliLiter(s) IV Push every 15 minutes  dextrose 50% Injectable 25 milliLiter(s) IV Push every 15 minutes  docusate sodium 100 milliGRAM(s) Oral three times a day  HYDROmorphone  Injectable 0.5 milliGRAM(s) IV Push every 4 hours PRN  HYDROmorphone  Injectable 0.5 milliGRAM(s) IV Push once  insulin lispro (HumaLOG) corrective regimen sliding scale   SubCutaneous three times a day before meals  insulin lispro (HumaLOG) corrective regimen sliding scale   SubCutaneous at bedtime  ketorolac   Injectable 30 milliGRAM(s) IV Push every 8 hours  metoclopramide Injectable 10 milliGRAM(s) IV Push every 8 hours  metoprolol     tartrate 25 milliGRAM(s) Oral every 8 hours  niCARdipine Infusion 3 mG/Hr IV Continuous <Continuous>  oxyCODONE    5 mG/acetaminophen 325 mG 1 Tablet(s) Oral every 6 hours PRN  oxyCODONE    5 mG/acetaminophen 325 mG 2 Tablet(s) Oral every 6 hours PRN  pantoprazole    Tablet 40 milliGRAM(s) Oral before breakfast  sodium chloride 0.9%. 1000 milliLiter(s) IV Continuous <Continuous>  vancomycin  IVPB 1000 milliGRAM(s) IV Intermittent every 12 hours    No Known Allergies    CONSTITUTIONAL: No fever, weight loss, or fatigue  EYES: No eye pain, visual disturbances, or discharge  ENMT:  No difficulty hearing, tinnitus, vertigo; No sinus or throat pain  NECK: No pain or stiffness  BREASTS: No pain, masses, or nipple discharge  RESPIRATORY: No cough, wheezing, chills or hemoptysis; No shortness of breath  CARDIOVASCULAR: No chest pain, palpitations, dizziness, or leg swelling  GASTROINTESTINAL: No abdominal or epigastric pain. No nausea, vomiting, or hematemesis; No diarrhea or constipation. No melena or hematochezia.  GENITOURINARY: No dysuria, frequency, hematuria, or incontinence  NEUROLOGICAL: No headaches, memory loss, loss of strength, numbness, or tremors  SKIN: No itching, burning, rashes, or lesions   LYMPH NODES: No enlarged glands  ENDOCRINE: No heat or cold intolerance; No hair loss  MUSCULOSKELETAL: No joint pain or swelling; No muscle, back, or extremity pain  PSYCHIATRIC: No depression, anxiety, mood swings, or difficulty sleeping  HEME/LYMPH: No easy bruising, or bleeding gums  ALLERY AND IMMUNOLOGIC: No hives or eczema      PHYSICAL EXAM:Daily     Daily Weight in k.3 (2018 00:00)  HEENT:     + NCAT  + EOMI  - Conjuctival edema   - Icterus   - Thrush   - ETT  - NGT/OGT  Neck:         + FROM    + JVD     - Nodes     - Masses    + Mid-line trachea   - Tracheostomy  Chest:         - Sternal click  - Sternal drainage  + Pacing wires  + Chest tubes  - SubQ emphysema  Lungs:          + CTA   - Rhonchi    - Rales    - Wheezing     - Decreased BS   - Dullness R L  Cardiac:       + S1 + S2    + RRR   - Irregular   - S3  - S4    - Murmurs   - Rub   - Hamman’s sign   Abdomen:    + BS     + Soft    + Non-tender     - Distended    - Organomegaly  - PEG  Extremities:   - Cyanosis U/L   - Clubbing  U/L  - LE/UE Edema   + Capillary refill    + Pulses   Neuro:        + Awake   +  Alert   - Confused   - Lethargic   - Sedated   - Generalized Weakness  Skin:        - Rashes    - Erythema   + Normal incisions   + IV sites intact  - Sacral decubitus    LABS:                        11.5   10.1  )-----------( 109      ( 2018 01:36 )             33.9     01-23    143  |  106  |  18  ----------------------------<  130<H>  4.3   |  27  |  0.79    Ca    8.2<L>      2018 01:36  Phos  3.6     -    TPro  6.2  /  Alb  4.4  /  TBili  1.3<H>  /  DBili  x   /  AST  27  /  ALT  13  /  AlkPhos  20<L>      PT/INR - ( 2018 01:36 )   PT: 13.7 sec;   INR: 1.25 ratio         PTT - ( 2018 01:36 )  PTT:31.9 sec  LIVER FUNCTIONS - ( 2018 01:36 )  Alb: 4.4 g/dL / Pro: 6.2 g/dL / ALK PHOS: 20 U/L / ALT: 13 U/L RC / AST: 27 U/L / GGT: x           Arterial Blood Gas:   @ 08:30  7.35/49/107/26/98/.7  ABG lactate: --  Arterial Blood Gas:   @ 06:24  7.38/46/111/27/99/1.9  ABG lactate: --  Arterial Blood Gas:   @ 04:16  7.42/42/76/27/96/2.7  ABG lactate: --  Arterial Blood Gas:   @ 01:33  7.44/40/85/26/98/2.6  ABG lactate: --  Arterial Blood Gas:   @ 00:44  7.42/41/121/26/99/2.1  ABG lactate: --  Arterial Blood Gas:   @ 19:28  7.40/41/202/25/100/1.1  ABG lactate: --  Arterial Blood Gas:   @ 16:42  7.40/41/167/25/99/.7  ABG lactate: --  Arterial Blood Gas:   @ 14:55  7.38/41/293/24/100/-.9  ABG lactate: --     CARDIAC MARKERS ( 2018 15:06 )  x     / 0.15 ng/mL / 191 U/L / x     / 22.2 ng/mL      LIVER FUNCTIONS - ( 2018 01:36 )  Alb: 4.4 g/dL / Pro: 6.2 g/dL / ALK PHOS: 20 U/L / ALT: 13 U/L RC / AST: 27 U/L / GGT: x             RADIOLOGY:  X Reviewed and interpreted by me

## 2018-01-23 NOTE — PHYSICAL THERAPY INITIAL EVALUATION ADULT - PERTINENT HX OF CURRENT PROBLEM, REHAB EVAL
65 y/o male PMH HTN, HLD, aortic valve regurgitation (mod to severe), aortic root dilatation, former smoker, Came for PST today for aortic valve replacement.. Pt s has history of aortic aneurysm and was being follow-up by  cardiologist and recently was advised to undergo surgery for prevention of  further complication  of  valve disorder. Chest CT: Stable aneurysmal dilatation of the ascending thoracic aorta

## 2018-01-23 NOTE — PHYSICAL THERAPY INITIAL EVALUATION ADULT - PLANNED THERAPY INTERVENTIONS, PT EVAL
gait training/stair training/strengthening bed mobility training/stair training/gait training/strengthening

## 2018-01-24 LAB
ANION GAP SERPL CALC-SCNC: 9 MMOL/L — SIGNIFICANT CHANGE UP (ref 5–17)
BUN SERPL-MCNC: 20 MG/DL — SIGNIFICANT CHANGE UP (ref 7–23)
CALCIUM SERPL-MCNC: 8.3 MG/DL — LOW (ref 8.4–10.5)
CHLORIDE SERPL-SCNC: 102 MMOL/L — SIGNIFICANT CHANGE UP (ref 96–108)
CO2 SERPL-SCNC: 25 MMOL/L — SIGNIFICANT CHANGE UP (ref 22–31)
CREAT SERPL-MCNC: 0.71 MG/DL — SIGNIFICANT CHANGE UP (ref 0.5–1.3)
GLUCOSE SERPL-MCNC: 115 MG/DL — HIGH (ref 70–99)
HCT VFR BLD CALC: 32.9 % — LOW (ref 39–50)
HGB BLD-MCNC: 11.5 G/DL — LOW (ref 13–17)
MCHC RBC-ENTMCNC: 32.9 PG — SIGNIFICANT CHANGE UP (ref 27–34)
MCHC RBC-ENTMCNC: 34.9 GM/DL — SIGNIFICANT CHANGE UP (ref 32–36)
MCV RBC AUTO: 94.2 FL — SIGNIFICANT CHANGE UP (ref 80–100)
PLATELET # BLD AUTO: 104 K/UL — LOW (ref 150–400)
POTASSIUM SERPL-MCNC: 4.3 MMOL/L — SIGNIFICANT CHANGE UP (ref 3.5–5.3)
POTASSIUM SERPL-SCNC: 4.3 MMOL/L — SIGNIFICANT CHANGE UP (ref 3.5–5.3)
RBC # BLD: 3.5 M/UL — LOW (ref 4.2–5.8)
RBC # FLD: 11.8 % — SIGNIFICANT CHANGE UP (ref 10.3–14.5)
SODIUM SERPL-SCNC: 136 MMOL/L — SIGNIFICANT CHANGE UP (ref 135–145)
WBC # BLD: 13.6 K/UL — HIGH (ref 3.8–10.5)
WBC # FLD AUTO: 13.6 K/UL — HIGH (ref 3.8–10.5)

## 2018-01-24 PROCEDURE — 71045 X-RAY EXAM CHEST 1 VIEW: CPT | Mod: 26

## 2018-01-24 PROCEDURE — 99232 SBSQ HOSP IP/OBS MODERATE 35: CPT

## 2018-01-24 RX ORDER — FUROSEMIDE 40 MG
40 TABLET ORAL DAILY
Qty: 0 | Refills: 0 | Status: DISCONTINUED | OUTPATIENT
Start: 2018-01-24 | End: 2018-01-27

## 2018-01-24 RX ORDER — METOPROLOL TARTRATE 50 MG
50 TABLET ORAL EVERY 8 HOURS
Qty: 0 | Refills: 0 | Status: DISCONTINUED | OUTPATIENT
Start: 2018-01-24 | End: 2018-01-27

## 2018-01-24 RX ORDER — HYDROMORPHONE HYDROCHLORIDE 2 MG/ML
4 INJECTION INTRAMUSCULAR; INTRAVENOUS; SUBCUTANEOUS EVERY 4 HOURS
Qty: 0 | Refills: 0 | Status: DISCONTINUED | OUTPATIENT
Start: 2018-01-24 | End: 2018-01-27

## 2018-01-24 RX ORDER — SPIRONOLACTONE 25 MG/1
50 TABLET, FILM COATED ORAL DAILY
Qty: 0 | Refills: 0 | Status: DISCONTINUED | OUTPATIENT
Start: 2018-01-24 | End: 2018-01-27

## 2018-01-24 RX ADMIN — Medication 325 MILLIGRAM(S): at 12:17

## 2018-01-24 RX ADMIN — ENOXAPARIN SODIUM 40 MILLIGRAM(S): 100 INJECTION SUBCUTANEOUS at 12:17

## 2018-01-24 RX ADMIN — Medication 30 MILLIGRAM(S): at 05:20

## 2018-01-24 RX ADMIN — HYDROMORPHONE HYDROCHLORIDE 4 MILLIGRAM(S): 2 INJECTION INTRAMUSCULAR; INTRAVENOUS; SUBCUTANEOUS at 21:30

## 2018-01-24 RX ADMIN — Medication 100 MILLIGRAM(S): at 05:31

## 2018-01-24 RX ADMIN — PANTOPRAZOLE SODIUM 40 MILLIGRAM(S): 20 TABLET, DELAYED RELEASE ORAL at 06:03

## 2018-01-24 RX ADMIN — Medication 25 MILLIGRAM(S): at 05:17

## 2018-01-24 RX ADMIN — Medication 100 MILLIGRAM(S): at 12:18

## 2018-01-24 RX ADMIN — Medication 100 MILLIGRAM(S): at 04:13

## 2018-01-24 RX ADMIN — ATORVASTATIN CALCIUM 40 MILLIGRAM(S): 80 TABLET, FILM COATED ORAL at 22:43

## 2018-01-24 RX ADMIN — HYDROMORPHONE HYDROCHLORIDE 4 MILLIGRAM(S): 2 INJECTION INTRAMUSCULAR; INTRAVENOUS; SUBCUTANEOUS at 20:58

## 2018-01-24 RX ADMIN — Medication 30 MILLIGRAM(S): at 13:21

## 2018-01-24 RX ADMIN — Medication 30 MILLIGRAM(S): at 05:50

## 2018-01-24 RX ADMIN — HYDROMORPHONE HYDROCHLORIDE 1 MILLIGRAM(S): 2 INJECTION INTRAMUSCULAR; INTRAVENOUS; SUBCUTANEOUS at 02:31

## 2018-01-24 RX ADMIN — HYDROMORPHONE HYDROCHLORIDE 1 MILLIGRAM(S): 2 INJECTION INTRAMUSCULAR; INTRAVENOUS; SUBCUTANEOUS at 07:27

## 2018-01-24 RX ADMIN — Medication 30 MILLIGRAM(S): at 13:50

## 2018-01-24 RX ADMIN — HYDROMORPHONE HYDROCHLORIDE 1 MILLIGRAM(S): 2 INJECTION INTRAMUSCULAR; INTRAVENOUS; SUBCUTANEOUS at 07:40

## 2018-01-24 RX ADMIN — Medication 25 MILLIGRAM(S): at 12:24

## 2018-01-24 RX ADMIN — Medication 100 MILLIGRAM(S): at 22:45

## 2018-01-24 RX ADMIN — HYDROMORPHONE HYDROCHLORIDE 1 MILLIGRAM(S): 2 INJECTION INTRAMUSCULAR; INTRAVENOUS; SUBCUTANEOUS at 02:45

## 2018-01-24 RX ADMIN — Medication 50 MILLIGRAM(S): at 22:44

## 2018-01-24 NOTE — PROGRESS NOTE ADULT - SUBJECTIVE AND OBJECTIVE BOX
VITAL SIGNS    Telemetry: NSR 60 -90   Vital Signs Last 24 Hrs  T(C): 36.8 (18 @ 07:33), Max: 37.6 (18 @ 20:00)  T(F): 98.3 (18 @ 07:33), Max: 99.7 (18 @ 20:00)  HR: 76 (18 07:33) (65 - 83)  BP: 104/62 (18 @ 07:33) (102/58 - 139/80)  RR: 18 (18 @ 07:33) (11 - 29)  SpO2: 97% (18 @ 07:33) (95% - 100%)             07:  -   @ 07:00  --------------------------------------------------------  IN: 790 mL / OUT: 1735 mL / NET: -945 mL     07:01  -   @ 10:13  --------------------------------------------------------  IN: 0 mL / OUT: 200 mL / NET: -200 mL       Daily     Daily Weight in k.2 (2018 06:14)  Admit Wt: Drug Dosing Weight  Height (cm): 177.8 (2018 07:53)  Weight (kg): 103 (2018 07:53)  BMI (kg/m2): 32.6 (2018 07:53)  BSA (m2): 2.2 (2018 07:53)      CAPILLARY BLOOD GLUCOSE      POCT Blood Glucose.: 121 mg/dL (2018 07:35)  POCT Blood Glucose.: 105 mg/dL (2018 21:26)  POCT Blood Glucose.: 117 mg/dL (2018 16:58)          MEDICATIONS  aspirin enteric coated 325 milliGRAM(s) Oral daily  atorvastatin 40 milliGRAM(s) Oral at bedtime  dextrose 50% Injectable 50 milliLiter(s) IV Push every 15 minutes  dextrose 50% Injectable 25 milliLiter(s) IV Push every 15 minutes  docusate sodium 100 milliGRAM(s) Oral three times a day  enoxaparin Injectable 40 milliGRAM(s) SubCutaneous daily  HYDROmorphone  Injectable 1 milliGRAM(s) IV Push every 4 hours PRN  insulin lispro (HumaLOG) corrective regimen sliding scale   SubCutaneous three times a day before meals  insulin lispro (HumaLOG) corrective regimen sliding scale   SubCutaneous at bedtime  ketorolac   Injectable 30 milliGRAM(s) IV Push every 8 hours  metoprolol     tartrate 25 milliGRAM(s) Oral every 8 hours  oxyCODONE    5 mG/acetaminophen 325 mG 2 Tablet(s) Oral every 4 hours PRN  pantoprazole    Tablet 40 milliGRAM(s) Oral before breakfast  sodium chloride 0.9%. 1000 milliLiter(s) IV Continuous <Continuous>      PHYSICAL EXAM  Subjective: Pt states he feels well overall. No complaints at this time. Denies any SOB, chest pain or fever  Neurology: alert and oriented x 3, nonfocal, no gross deficits  CV : S1, S2  Sternal Wound :  CDI , Stable  Lungs: CTA b/l  Abdomen: soft, NT,ND, (+ )Bowel sounds  :  voiding  Extremities:  Lower extremities with trace edema b/l. No calve tenderness b/l     LABS      136  |  102  |  20  ----------------------------<  115<H>  4.3   |  25  |  0.71    Ca    8.3<L>      2018 02:45  Phos  3.6         TPro  6.2  /  Alb  4.4  /  TBili  1.3<H>  /  DBili  x   /  AST  27  /  ALT  13  /  AlkPhos  20<L>                                   11.5   13.6  )-----------( 104      ( 2018 02:45 )             32.9          PT/INR - ( 2018 01:36 )   PT: 13.7 sec;   INR: 1.25 ratio         PTT - ( 2018 01:36 )  PTT:31.9 sec       PAST MEDICAL & SURGICAL HISTORY:  Essential hypertension  LVE (left ventricular enlargement)  Hyperlipidemia, unspecified hyperlipidemia type  Colon polyp  Aneurysm of ascending aorta: being by PMD 10 years ago size stays same as per patient  No significant past surgical history

## 2018-01-24 NOTE — PROGRESS NOTE ADULT - SUBJECTIVE AND OBJECTIVE BOX
Patient discussed on morning rounds with Dr. Alvarado    Operation / Date: POD 1 aortic aneurysm repair with aortic valve replacement     SUBJECTIVE ASSESSMENT:  68y Male POD 1, PMH HTN, HLD, aortic valve regurgitation (mod to severe), aortic root dilatation, former smoker. Denies any complaints at this time    Vital Signs Last 24 Hrs  T(C): 37.1 (23 Jan 2018 21:53), Max: 37.6 (23 Jan 2018 20:00)  T(F): 98.7 (23 Jan 2018 21:53), Max: 99.7 (23 Jan 2018 20:00)  HR: 76 (23 Jan 2018 21:53) (63 - 102)  BP: 134/72 (23 Jan 2018 21:53) (102/58 - 139/80)  BP(mean): 98 (23 Jan 2018 21:53) (75 - 102)  RR: 19 (23 Jan 2018 21:53) (10 - 31)  SpO2: 98% (23 Jan 2018 21:53) (95% - 100%)  I&O's Detail    22 Jan 2018 07:01  -  23 Jan 2018 07:00  --------------------------------------------------------  IN:    Albumin 5%  - 250 mL: 1750 mL    dexmedetomidine Infusion: 141.4 mL    DOBUTamine Infusion: 102.7 mL    insulin Infusion: 12 mL    IV PiggyBack: 700 mL    Lactated Ringers IV Bolus: 1000 mL    niCARdipine Infusion: 90 mL    sodium chloride 0.9%.: 170 mL  Total IN: 3966.1 mL    OUT:    Bulb: 320 mL    Chest Tube: 255 mL    Indwelling Catheter - Urethral: 3385 mL    Nasoenteral Tube: 100 mL  Total OUT: 4060 mL    Total NET: -93.9 mL      23 Jan 2018 07:01  -  24 Jan 2018 00:20  --------------------------------------------------------  IN:    IV PiggyBack: 600 mL    sodium chloride 0.9%.: 150 mL  Total IN: 750 mL    OUT:    Bulb: 230 mL    Chest Tube: 10 mL    Indwelling Catheter - Urethral: 740 mL  Total OUT: 980 mL    Total NET: -230 mL    PHYSICAL EXAM  General: NAD  HEENT:  NC/AT  Neuro: A&Ox4, speech clear, no focal deficits noted  Respiratory: B/L BS diminished at bases, otherwise clear   Cardiovascular: RRR, normal S1S2  Chest: MSI C/D/I, stable   GI: Abd soft, NT/ND, + hypoactive BSx4Q   Peripheral Vascular:  B/L LE trace edema, 2+ peripheral pulses, no clubbing, cyanosis, varicosities/PVD noted  Musculoskeletal: B/L UE and LE 5/5 strength   Psychiatric: Normal mood, normal affect observed  Skin: Normal exam to inspection and palpation w/o bleeding or hematoma     CHEST TUBE: + mediastinal JANUARY  EPICARDIAL WIRES: isolated  VEGA: Yes      LABS:                        11.5   10.1  )-----------( 109      ( 23 Jan 2018 01:36 )             33.9       COUMADIN:  Yes/No. REASON: .    PT/INR - ( 23 Jan 2018 01:36 )   PT: 13.7 sec;   INR: 1.25 ratio         PTT - ( 23 Jan 2018 01:36 )  PTT:31.9 sec    01-23    143  |  106  |  18  ----------------------------<  130<H>  4.3   |  27  |  0.79    Ca    8.2<L>      23 Jan 2018 01:36  Phos  3.6     01-23    TPro  6.2  /  Alb  4.4  /  TBili  1.3<H>  /  DBili  x   /  AST  27  /  ALT  13  /  AlkPhos  20<L>  01-23      MEDICATIONS  (STANDING):  aspirin enteric coated 325 milliGRAM(s) Oral daily  atorvastatin 40 milliGRAM(s) Oral at bedtime  cefuroxime  IVPB 1500 milliGRAM(s) IV Intermittent every 8 hours  dextrose 50% Injectable 50 milliLiter(s) IV Push every 15 minutes  dextrose 50% Injectable 25 milliLiter(s) IV Push every 15 minutes  docusate sodium 100 milliGRAM(s) Oral three times a day  enoxaparin Injectable 40 milliGRAM(s) SubCutaneous daily  insulin lispro (HumaLOG) corrective regimen sliding scale   SubCutaneous three times a day before meals  insulin lispro (HumaLOG) corrective regimen sliding scale   SubCutaneous at bedtime  ketorolac   Injectable 30 milliGRAM(s) IV Push every 8 hours  metoprolol     tartrate 25 milliGRAM(s) Oral every 8 hours  pantoprazole    Tablet 40 milliGRAM(s) Oral before breakfast  sodium chloride 0.9%. 1000 milliLiter(s) (10 mL/Hr) IV Continuous <Continuous>    MEDICATIONS  (PRN):  HYDROmorphone  Injectable 1 milliGRAM(s) IV Push every 4 hours PRN Moderate Pain (4 - 6)  oxyCODONE    5 mG/acetaminophen 325 mG 2 Tablet(s) Oral every 4 hours PRN Mild Pain (1 - 3)        RADIOLOGY & ADDITIONAL TESTS: < from: Xray Chest 1 View AP/PA (01.23.18 @ 03:52) >  Impression:    The heart is slightly enlarged. Bibasilar platelike atelectasis.   Endotracheal tube and NG tube were removed. Mediastinal tube are in good   position. A Cordis sheet catheter is seen on the right and tip is   superior vena cava. No pneumothorax. That is post sternotomy.    < end of copied text >

## 2018-01-24 NOTE — PROGRESS NOTE ADULT - ATTENDING COMMENTS
I will be away starting 1/25 and returning 1/29 . Faculty cardiology will cover. Please call [phone] with questions.

## 2018-01-24 NOTE — PROGRESS NOTE ADULT - SUBJECTIVE AND OBJECTIVE BOX
CC:    Interval History:     MEDICATIONS:  aspirin enteric coated 325 milliGRAM(s) Oral daily  atorvastatin 40 milliGRAM(s) Oral at bedtime  dextrose 50% Injectable 50 milliLiter(s) IV Push every 15 minutes  dextrose 50% Injectable 25 milliLiter(s) IV Push every 15 minutes  docusate sodium 100 milliGRAM(s) Oral three times a day  enoxaparin Injectable 40 milliGRAM(s) SubCutaneous daily  HYDROmorphone   Tablet 4 milliGRAM(s) Oral every 4 hours PRN  insulin lispro (HumaLOG) corrective regimen sliding scale   SubCutaneous three times a day before meals  insulin lispro (HumaLOG) corrective regimen sliding scale   SubCutaneous at bedtime  metoprolol     tartrate 25 milliGRAM(s) Oral every 8 hours  oxyCODONE    5 mG/acetaminophen 325 mG 2 Tablet(s) Oral every 4 hours PRN  pantoprazole    Tablet 40 milliGRAM(s) Oral before breakfast  sodium chloride 0.9%. 1000 milliLiter(s) IV Continuous <Continuous>      LABS:      136  |  102  |  20  ----------------------------<  115<H>  4.3   |  25  |  0.71    Ca    8.3<L>      2018 02:45  Phos  3.6         TPro  6.2  /  Alb  4.4  /  TBili  1.3<H>  /  DBili  x   /  AST  27  /  ALT  13  /  AlkPhos  20<L>                            11.5   13.6  )-----------( 104      ( 2018 02:45 )             32.9     PT/INR - ( 2018 01:36 )   PT: 13.7 sec;   INR: 1.25 ratio         PTT - ( 2018 01:36 )  PTT:31.9 sec          VITAL SIGNS:   T(C): 36.7 (18 @ 11:54), Max: 37.6 (18 @ 20:00)  HR: 86 (18 @ 11:54) (71 - 86)  BP: 134/78 (18 @ 11:54) (104/62 - 139/80)  RR: 18 (18 @ 11:54) (13 - 19)  SpO2: 98% (18 @ 11:54) (95% - 98%)  Daily     Daily Weight in k.2 (2018 06:14)  I&O's Summary    2018 07:  -  2018 07:00  --------------------------------------------------------  IN: 790 mL / OUT: 1735 mL / NET: -945 mL    2018 07:  -  2018 18:13  --------------------------------------------------------  IN: 780 mL / OUT: 1150 mL / NET: -370 mL        TELE: CARDIOLOGY FOR DR. GARVEY    CC: s/p AVR aortic aneurysm repair.     Interval History: No significant cardiac events overnight.     MEDICATIONS:  aspirin enteric coated 325 milliGRAM(s) Oral daily  atorvastatin 40 milliGRAM(s) Oral at bedtime  dextrose 50% Injectable 50 milliLiter(s) IV Push every 15 minutes  dextrose 50% Injectable 25 milliLiter(s) IV Push every 15 minutes  docusate sodium 100 milliGRAM(s) Oral three times a day  enoxaparin Injectable 40 milliGRAM(s) SubCutaneous daily  HYDROmorphone   Tablet 4 milliGRAM(s) Oral every 4 hours PRN  insulin lispro (HumaLOG) corrective regimen sliding scale   SubCutaneous three times a day before meals  insulin lispro (HumaLOG) corrective regimen sliding scale   SubCutaneous at bedtime  metoprolol     tartrate 25 milliGRAM(s) Oral every 8 hours  oxyCODONE    5 mG/acetaminophen 325 mG 2 Tablet(s) Oral every 4 hours PRN  pantoprazole    Tablet 40 milliGRAM(s) Oral before breakfast  sodium chloride 0.9%. 1000 milliLiter(s) IV Continuous <Continuous>      LABS:      136  |  102  |  20  ----------------------------<  115<H>  4.3   |  25  |  0.71    Ca    8.3<L>      2018 02:45  Phos  3.6         TPro  6.2  /  Alb  4.4  /  TBili  1.3<H>  /  DBili  x   /  AST  27  /  ALT  13  /  AlkPhos  20<L>                            11.5   13.6  )-----------( 104      ( 2018 02:45 )             32.9     PT/INR - ( 2018 01:36 )   PT: 13.7 sec;   INR: 1.25 ratio         PTT - ( 2018 01:36 )  PTT:31.9 sec          VITAL SIGNS:   T(C): 36.7 (18 @ 11:54), Max: 37.6 (18 @ 20:00)  HR: 86 (18 @ 11:54) (71 - 86)  BP: 134/78 (18 @ 11:54) (104/62 - 139/80)  RR: 18 (18 @ 11:54) (13 - 19)  SpO2: 98% (18 @ 11:54) (95% - 98%)  Daily     Daily Weight in k.2 (2018 06:14)  I&O's Summary    2018 07:  -  2018 07:00  --------------------------------------------------------  IN: 790 mL / OUT: 1735 mL / NET: -945 mL    2018 07:  -  2018 18:13  --------------------------------------------------------  IN: 780 mL / OUT: 1150 mL / NET: -370 mL        TELE: No significant ectopy

## 2018-01-25 ENCOUNTER — TRANSCRIPTION ENCOUNTER (OUTPATIENT)
Age: 68
End: 2018-01-25

## 2018-01-25 LAB
ANION GAP SERPL CALC-SCNC: 13 MMOL/L — SIGNIFICANT CHANGE UP (ref 5–17)
BUN SERPL-MCNC: 18 MG/DL — SIGNIFICANT CHANGE UP (ref 7–23)
CALCIUM SERPL-MCNC: 8.5 MG/DL — SIGNIFICANT CHANGE UP (ref 8.4–10.5)
CHLORIDE SERPL-SCNC: 98 MMOL/L — SIGNIFICANT CHANGE UP (ref 96–108)
CO2 SERPL-SCNC: 26 MMOL/L — SIGNIFICANT CHANGE UP (ref 22–31)
CREAT SERPL-MCNC: 0.77 MG/DL — SIGNIFICANT CHANGE UP (ref 0.5–1.3)
GLUCOSE SERPL-MCNC: 102 MG/DL — HIGH (ref 70–99)
HCT VFR BLD CALC: 31.1 % — LOW (ref 39–50)
HGB BLD-MCNC: 10.9 G/DL — LOW (ref 13–17)
MCHC RBC-ENTMCNC: 32.6 PG — SIGNIFICANT CHANGE UP (ref 27–34)
MCHC RBC-ENTMCNC: 35 GM/DL — SIGNIFICANT CHANGE UP (ref 32–36)
MCV RBC AUTO: 93 FL — SIGNIFICANT CHANGE UP (ref 80–100)
PLATELET # BLD AUTO: 117 K/UL — LOW (ref 150–400)
POTASSIUM SERPL-MCNC: 4.3 MMOL/L — SIGNIFICANT CHANGE UP (ref 3.5–5.3)
POTASSIUM SERPL-SCNC: 4.3 MMOL/L — SIGNIFICANT CHANGE UP (ref 3.5–5.3)
RBC # BLD: 3.35 M/UL — LOW (ref 4.2–5.8)
RBC # FLD: 11.6 % — SIGNIFICANT CHANGE UP (ref 10.3–14.5)
SODIUM SERPL-SCNC: 137 MMOL/L — SIGNIFICANT CHANGE UP (ref 135–145)
WBC # BLD: 10 K/UL — SIGNIFICANT CHANGE UP (ref 3.8–10.5)
WBC # FLD AUTO: 10 K/UL — SIGNIFICANT CHANGE UP (ref 3.8–10.5)

## 2018-01-25 PROCEDURE — 71045 X-RAY EXAM CHEST 1 VIEW: CPT | Mod: 26

## 2018-01-25 RX ADMIN — Medication 50 MILLIGRAM(S): at 13:20

## 2018-01-25 RX ADMIN — Medication 50 MILLIGRAM(S): at 21:35

## 2018-01-25 RX ADMIN — Medication 100 MILLIGRAM(S): at 05:24

## 2018-01-25 RX ADMIN — HYDROMORPHONE HYDROCHLORIDE 4 MILLIGRAM(S): 2 INJECTION INTRAMUSCULAR; INTRAVENOUS; SUBCUTANEOUS at 03:19

## 2018-01-25 RX ADMIN — ATORVASTATIN CALCIUM 40 MILLIGRAM(S): 80 TABLET, FILM COATED ORAL at 21:35

## 2018-01-25 RX ADMIN — Medication 100 MILLIGRAM(S): at 13:20

## 2018-01-25 RX ADMIN — ENOXAPARIN SODIUM 40 MILLIGRAM(S): 100 INJECTION SUBCUTANEOUS at 13:20

## 2018-01-25 RX ADMIN — Medication 40 MILLIGRAM(S): at 05:23

## 2018-01-25 RX ADMIN — SPIRONOLACTONE 50 MILLIGRAM(S): 25 TABLET, FILM COATED ORAL at 05:24

## 2018-01-25 RX ADMIN — Medication 100 MILLIGRAM(S): at 21:35

## 2018-01-25 RX ADMIN — PANTOPRAZOLE SODIUM 40 MILLIGRAM(S): 20 TABLET, DELAYED RELEASE ORAL at 07:48

## 2018-01-25 RX ADMIN — Medication 50 MILLIGRAM(S): at 05:23

## 2018-01-25 RX ADMIN — HYDROMORPHONE HYDROCHLORIDE 4 MILLIGRAM(S): 2 INJECTION INTRAMUSCULAR; INTRAVENOUS; SUBCUTANEOUS at 13:21

## 2018-01-25 RX ADMIN — Medication 325 MILLIGRAM(S): at 13:20

## 2018-01-25 RX ADMIN — HYDROMORPHONE HYDROCHLORIDE 4 MILLIGRAM(S): 2 INJECTION INTRAMUSCULAR; INTRAVENOUS; SUBCUTANEOUS at 14:15

## 2018-01-25 RX ADMIN — HYDROMORPHONE HYDROCHLORIDE 4 MILLIGRAM(S): 2 INJECTION INTRAMUSCULAR; INTRAVENOUS; SUBCUTANEOUS at 03:50

## 2018-01-25 NOTE — DISCHARGE NOTE ADULT - PLAN OF CARE
complete recovery shower daily  weigh yourself daily  continue current prescriptions as ordered  increase activity as tolerated   no added salt; low salt diet.   follow up with Cardiologist in 1-2 weeks. Call to schedule appointment.  follow up with cardiac surgeon, Dr. Alvarado in 7-10 days. CAll to schedule appointment. [phone]

## 2018-01-25 NOTE — PROGRESS NOTE ADULT - SUBJECTIVE AND OBJECTIVE BOX
VITAL SIGNS    Telemetry:    sr  60-70    Vital Signs Last 24 Hrs  T(C): 36.8 (18 @ 04:28), Max: 36.9 (18 @ 20:12)  T(F): 98.3 (18 @ 04:28), Max: 98.5 (18 @ 20:12)  HR: 84 (18 @ 04:28) (84 - 86)  BP: 122/69 (18 @ 04:28) (122/69 - 134/78)  RR: 18 (18 @ 04:28) (18 - 18)  SpO2: 94% (18 @ 04:28) (94% - 98%)           Daily Weight in k.9 (2018 08:00)        CAPILLARY BLOOD GLUCOSE      POCT Blood Glucose.: 105 mg/dL (2018 07:47)  POCT Blood Glucose.: 105 mg/dL (2018 21:36)  POCT Blood Glucose.: 110 mg/dL (2018 16:02)  POCT Blood Glucose.: 118 mg/dL (2018 11:50)          Pacing Wires                                      Isolated                   PHYSICAL EXAM    Neurology: alert and oriented x 3, moves all extremities with no defecits  CV :  RRR  Sternal Wound :  CDI , Stable  with  pw  Lungs:   CTA B/L  Abdomen: soft, nontender, nondistended, positive bowel sounds, last bowel movement   Extremities:       plus one le edema   no calf twnderness

## 2018-01-25 NOTE — DISCHARGE NOTE ADULT - MEDICATION SUMMARY - MEDICATIONS TO TAKE
I will START or STAY ON the medications listed below when I get home from the hospital:    spironolactone 25 mg oral tablet  -- 2 tab(s) by mouth once a day  -- Indication: For diuretic    HYDROmorphone 2 mg oral tablet  -- 1 tab(s) by mouth every 6 hours, As Needed for pain MDD:4  -- Caution federal law prohibits the transfer of this drug to any person other  than the person for whom it was prescribed.  May cause drowsiness.  Alcohol may intensify this effect.  Use care when operating dangerous machinery.  This prescription cannot be refilled.  Using more of this medication than prescribed may cause serious breathing problems.    -- Indication: For Pain    aspirin 325 mg oral delayed release tablet  -- 1 tab(s) by mouth once a day  -- Indication: For blood thinner    simvastatin 20 mg oral tablet  -- 1 tab(s) by mouth once a day (at bedtime)  -- Indication: For cholesterol    Lopressor 50 mg oral tablet  -- 1 tab(s) by mouth every 8 hours  -- Indication: For anti-arrythmic    furosemide 40 mg oral tablet  -- 1 tab(s) by mouth once a day  -- Indication: For diuretic    docusate sodium 100 mg oral capsule  -- 1 cap(s) by mouth 3 times a day  -- Indication: For Stool softener    pantoprazole 40 mg oral delayed release tablet  -- 1 tab(s) by mouth once a day (before a meal)  -- Indication: For antacid

## 2018-01-25 NOTE — DISCHARGE NOTE ADULT - OTHER SIGNIFICANT FINDINGS
VSS  tele: rsr 60-80  midsternal incision cdi suzette  lungs clear. RR easy unlabored  +bs nt nd + bm  LE: trace le edema bilaterally; neg calf tenderness; PPP bilaterally

## 2018-01-25 NOTE — DISCHARGE NOTE ADULT - REASON FOR ADMISSION
for heart valve surgery s/p 1/22/18 Aortic valve replacement with tissue valve; Ascending Aortic Aneursym repair   ef- nl

## 2018-01-25 NOTE — DISCHARGE NOTE ADULT - ADDITIONAL INSTRUCTIONS
follow up with Cardiologist in 1-2 weeks. Call to schedule appointment.  follow up with cardiac surgeon, Dr. Alvarado in 7-10 days. CAll to schedule appointment. [phone]

## 2018-01-25 NOTE — DISCHARGE NOTE ADULT - NS AS ACTIVITY OBS
No Heavy lifting/straining/Walking-Outdoors allowed/no driving until cleared by Dr. Alvarado/Sex allowed/Showering allowed/Walking-Indoors allowed/Stairs allowed/Do not make important decisions/Do not drive or operate machinery

## 2018-01-25 NOTE — DISCHARGE NOTE ADULT - CARE PLAN
Principal Discharge DX:	S/P AVR  Goal:	complete recovery  Assessment and plan of treatment:	shower daily  weigh yourself daily  continue current prescriptions as ordered  increase activity as tolerated   no added salt; low salt diet.   follow up with Cardiologist in 1-2 weeks. Call to schedule appointment.  follow up with cardiac surgeon, Dr. Alvarado in 7-10 days. CAll to schedule appointment. [phone]

## 2018-01-25 NOTE — DISCHARGE NOTE ADULT - PATIENT PORTAL LINK FT
“You can access the [facility] Patient Portal, offered by [facility], by registering with the following website: [email or web address]”

## 2018-01-25 NOTE — DISCHARGE NOTE ADULT - HOSPITAL COURSE
69 y/o M  POD 1 aortic aneurysm repair with aortic valve replacement   Extubated, transferred to SDU. Currently SR, tolerating BB     1/24 Pt transferred from CTU to step down unit overnight. POD # 2 today. JANUARY drain d/c in AM by Dr. Alvarado. In NSR 60-90; tolerating BB; doing well overall.  1/25   vss     ambulating  1/26 pw d/c this am; VSS  Discharge planning- home 1/27 as per Dr. Alvarado

## 2018-01-25 NOTE — DISCHARGE NOTE ADULT - CARE PROVIDER_API CALL
Juan F Alvarado), Surgery; Surgical Critical Care; Thoracic and Cardiac Surgery  [address] [place] [address]  Phone: [phone]  Fax: [phone]

## 2018-01-25 NOTE — DISCHARGE NOTE ADULT - MEDICATION SUMMARY - MEDICATIONS TO STOP TAKING
I will STOP taking the medications listed below when I get home from the hospital:    enalapril 10 mg oral tablet  -- 1 tab(s) by mouth once a day    Toprol-XL 50 mg oral tablet, extended release  -- 1 tab(s) by mouth once a day

## 2018-01-25 NOTE — DISCHARGE NOTE ADULT - CARE PROVIDERS DIRECT ADDRESSES
[email or web address]

## 2018-01-26 DIAGNOSIS — E87.70 FLUID OVERLOAD, UNSPECIFIED: ICD-10-CM

## 2018-01-26 DIAGNOSIS — Z29.9 ENCOUNTER FOR PROPHYLACTIC MEASURES, UNSPECIFIED: ICD-10-CM

## 2018-01-26 LAB
ANION GAP SERPL CALC-SCNC: 9 MMOL/L — SIGNIFICANT CHANGE UP (ref 5–17)
BUN SERPL-MCNC: 14 MG/DL — SIGNIFICANT CHANGE UP (ref 7–23)
CALCIUM SERPL-MCNC: 9.2 MG/DL — SIGNIFICANT CHANGE UP (ref 8.4–10.5)
CHLORIDE SERPL-SCNC: 102 MMOL/L — SIGNIFICANT CHANGE UP (ref 96–108)
CO2 SERPL-SCNC: 30 MMOL/L — SIGNIFICANT CHANGE UP (ref 22–31)
CREAT SERPL-MCNC: 0.85 MG/DL — SIGNIFICANT CHANGE UP (ref 0.5–1.3)
GLUCOSE SERPL-MCNC: 110 MG/DL — HIGH (ref 70–99)
HCT VFR BLD CALC: 33.6 % — LOW (ref 39–50)
HGB BLD-MCNC: 12.1 G/DL — LOW (ref 13–17)
MCHC RBC-ENTMCNC: 33.2 PG — SIGNIFICANT CHANGE UP (ref 27–34)
MCHC RBC-ENTMCNC: 35.9 GM/DL — SIGNIFICANT CHANGE UP (ref 32–36)
MCV RBC AUTO: 92.5 FL — SIGNIFICANT CHANGE UP (ref 80–100)
PLATELET # BLD AUTO: 154 K/UL — SIGNIFICANT CHANGE UP (ref 150–400)
POTASSIUM SERPL-MCNC: 4.9 MMOL/L — SIGNIFICANT CHANGE UP (ref 3.5–5.3)
POTASSIUM SERPL-SCNC: 4.9 MMOL/L — SIGNIFICANT CHANGE UP (ref 3.5–5.3)
RBC # BLD: 3.64 M/UL — LOW (ref 4.2–5.8)
RBC # FLD: 11.4 % — SIGNIFICANT CHANGE UP (ref 10.3–14.5)
SODIUM SERPL-SCNC: 141 MMOL/L — SIGNIFICANT CHANGE UP (ref 135–145)
WBC # BLD: 8.6 K/UL — SIGNIFICANT CHANGE UP (ref 3.8–10.5)
WBC # FLD AUTO: 8.6 K/UL — SIGNIFICANT CHANGE UP (ref 3.8–10.5)

## 2018-01-26 RX ADMIN — Medication 100 MILLIGRAM(S): at 22:06

## 2018-01-26 RX ADMIN — Medication 325 MILLIGRAM(S): at 12:32

## 2018-01-26 RX ADMIN — Medication 100 MILLIGRAM(S): at 13:40

## 2018-01-26 RX ADMIN — Medication 50 MILLIGRAM(S): at 06:02

## 2018-01-26 RX ADMIN — HYDROMORPHONE HYDROCHLORIDE 4 MILLIGRAM(S): 2 INJECTION INTRAMUSCULAR; INTRAVENOUS; SUBCUTANEOUS at 03:47

## 2018-01-26 RX ADMIN — SPIRONOLACTONE 50 MILLIGRAM(S): 25 TABLET, FILM COATED ORAL at 06:02

## 2018-01-26 RX ADMIN — ATORVASTATIN CALCIUM 40 MILLIGRAM(S): 80 TABLET, FILM COATED ORAL at 22:06

## 2018-01-26 RX ADMIN — ENOXAPARIN SODIUM 40 MILLIGRAM(S): 100 INJECTION SUBCUTANEOUS at 12:32

## 2018-01-26 RX ADMIN — Medication 50 MILLIGRAM(S): at 22:06

## 2018-01-26 RX ADMIN — PANTOPRAZOLE SODIUM 40 MILLIGRAM(S): 20 TABLET, DELAYED RELEASE ORAL at 06:02

## 2018-01-26 RX ADMIN — Medication 50 MILLIGRAM(S): at 13:40

## 2018-01-26 RX ADMIN — Medication 100 MILLIGRAM(S): at 06:02

## 2018-01-26 RX ADMIN — Medication 40 MILLIGRAM(S): at 06:02

## 2018-01-26 RX ADMIN — HYDROMORPHONE HYDROCHLORIDE 4 MILLIGRAM(S): 2 INJECTION INTRAMUSCULAR; INTRAVENOUS; SUBCUTANEOUS at 03:17

## 2018-01-26 NOTE — PROGRESS NOTE ADULT - PROBLEM SELECTOR PROBLEM 1
Other pulmonary insufficiency, not elsewhere classified, following trauma and surgery
S/P AVR

## 2018-01-26 NOTE — PROGRESS NOTE ADULT - PROBLEM SELECTOR PLAN 1
C/w current management. ASA for valve patency, BB for atrial fibrillation prophylaxis and uptitrate as tolerated  DVT ppx - Lovenox, GI ppx - Protonix  FSG control with Humalog SSI, pain control  D/c morris  Monitor JANUARY drain output d/c by Dr. Alvarado 1/24 AM  OOB to chair, ambulate as tolerated, incentive spirometry and pulm toilet  D/c planning
C/w current management. ASA for valve patency, BB for atrial fibrillation prophylaxis and uptitrate as tolerated  DVT ppx - Lovenox, GI ppx - Protonix  FSG control with Humalog SSI, pain control  D/c morris  Monitor JANUARY drain output and d/c as per attending  OOB to chair, ambulate as tolerated, incentive spirometry and pulm toilet  D/c planning
Respiratory status required supplemental oxygen & the following of continuous pulse oximetry for support & to prevent decompensation  Continued early mobilization as tolerated  Addressed analgesic regimen to optimize function
continue postop care  continue asa/ statin/ b-blocker  diuresis   d/c pw today  increase activity as tolerated  pain management  Discharge planning - home sat
ASA , BB lop 50q8  D/c planning this weekend

## 2018-01-26 NOTE — PROGRESS NOTE ADULT - SUBJECTIVE AND OBJECTIVE BOX
VITAL SIGNS    Telemetry:  rsr 1st 60-80  Vital Signs Last 24 Hrs  T(C): 36.9 (01-26-18 @ 05:35), Max: 37.1 (01-25-18 @ 20:15)  T(F): 98.4 (01-26-18 @ 05:35), Max: 98.8 (01-25-18 @ 20:15)  HR: 81 (01-26-18 @ 05:35) (77 - 81)  BP: 145/83 (01-26-18 @ 05:35) (134/78 - 145/83)  RR: 18 (01-26-18 @ 05:35) (18 - 18)  SpO2: 94% (01-26-18 @ 05:35) (94% - 96%)            01-25 @ 07:01  -  01-26 @ 07:00  --------------------------------------------------------  IN: 550 mL / OUT: 525 mL / NET: 25 mL       Daily     Daily   Admit Wt: Drug Dosing Weight  Height (cm): 177.8 (22 Jan 2018 07:53)  Weight (kg): 103 (22 Jan 2018 07:53)  BMI (kg/m2): 32.6 (22 Jan 2018 07:53)  BSA (m2): 2.2 (22 Jan 2018 07:53)      CAPILLARY BLOOD GLUCOSE      POCT Blood Glucose.: 105 mg/dL (26 Jan 2018 12:06)  POCT Blood Glucose.: 107 mg/dL (26 Jan 2018 07:30)  POCT Blood Glucose.: 96 mg/dL (25 Jan 2018 21:39)  POCT Blood Glucose.: 108 mg/dL (25 Jan 2018 16:23)          aspirin enteric coated 325 milliGRAM(s) Oral daily  atorvastatin 40 milliGRAM(s) Oral at bedtime  dextrose 50% Injectable 50 milliLiter(s) IV Push every 15 minutes  dextrose 50% Injectable 25 milliLiter(s) IV Push every 15 minutes  docusate sodium 100 milliGRAM(s) Oral three times a day  enoxaparin Injectable 40 milliGRAM(s) SubCutaneous daily  furosemide    Tablet 40 milliGRAM(s) Oral daily  HYDROmorphone   Tablet 4 milliGRAM(s) Oral every 4 hours PRN  insulin lispro (HumaLOG) corrective regimen sliding scale   SubCutaneous three times a day before meals  insulin lispro (HumaLOG) corrective regimen sliding scale   SubCutaneous at bedtime  metoprolol     tartrate 50 milliGRAM(s) Oral every 8 hours  oxyCODONE    5 mG/acetaminophen 325 mG 2 Tablet(s) Oral every 4 hours PRN  pantoprazole    Tablet 40 milliGRAM(s) Oral before breakfast  sodium chloride 0.9%. 1000 milliLiter(s) IV Continuous <Continuous>  spironolactone 50 milliGRAM(s) Oral daily      PHYSICAL EXAM    Subjective: "Hi.   Neurology: alert and oriented x 3, nonfocal, no gross deficits  CV : tele: rsr 1st 60-80  Sternal Wound :  CDI with dressing , Stable; +pw isolated  Lungs: clear. RR easy, unlabored   Abdomen: soft, nontender, nondistended, positive bowel sounds, + bowel movement   Neg N/V/D   :  pt voiding without difficulty   Extremities:   MCFADDEN; neg LE edema, neg calf tenderness.   PPP bilaterally      PW: + isolated   Chest tubes: none

## 2018-01-26 NOTE — PROGRESS NOTE ADULT - ASSESSMENT
67 y/o M  POD 1 aortic aneurysm repair with aortic valve replacement   Extubated, transferred to SDU. Currently SR, tolerating BB
68 M s/p AVR and aortic aneurysm repair POD #2. HTN, HLD  ·	Patient is progressing well post-op. Pain is well controlled. No SOB.   ·	Continue to up titrate beta blocker as BP and HR tolerate.  ·	Strict I and O. Patient started on lasix and aldactone.   ·	Continue other therapies.   ·	Ambulate.   ·	D/w CTS.
69 y/o M  POD 1 aortic aneurysm repair with aortic valve replacement   Extubated, transferred to SDU. Currently SR, tolerating BB     1/24 Pt transferred from CTU to step down unit overnight. POD # 2 today. JANUARY drain d/c in AM by Dr. Alvarado. In NSR 60-90; tolerating BB; doing well overall.  1/25   vss     ambulating  1/26 pw d/c this am; VSS  Discharge planning- home sat if stable overnight
S/P AVR-ascending aorta repair
67 y/o M  POD 1 aortic aneurysm repair with aortic valve replacement   Extubated, transferred to SDU. Currently SR, tolerating BB     1/24 Pt transferred from CTU to step down unit overnight. POD # 2 today. JANUARY drain d/c in AM by Dr. Alvarado. In NSR 60-90; tolerating BB; doing well overall.  1/25   vss     ambulating
69 y/o M  POD 1 aortic aneurysm repair with aortic valve replacement   Extubated, transferred to SDU. Currently SR, tolerating BB     1/24 Pt transferred from CTU to step down unit overnight. POD # 2 today. JANUARY drain d/c in AM by Dr. Alvarado. In NSR 60-90; tolerating BB; doing well overall.

## 2018-01-27 VITALS — WEIGHT: 220.02 LBS

## 2018-01-27 LAB
ANION GAP SERPL CALC-SCNC: 10 MMOL/L — SIGNIFICANT CHANGE UP (ref 5–17)
BUN SERPL-MCNC: 18 MG/DL — SIGNIFICANT CHANGE UP (ref 7–23)
CALCIUM SERPL-MCNC: 9.3 MG/DL — SIGNIFICANT CHANGE UP (ref 8.4–10.5)
CHLORIDE SERPL-SCNC: 98 MMOL/L — SIGNIFICANT CHANGE UP (ref 96–108)
CO2 SERPL-SCNC: 29 MMOL/L — SIGNIFICANT CHANGE UP (ref 22–31)
CREAT SERPL-MCNC: 0.76 MG/DL — SIGNIFICANT CHANGE UP (ref 0.5–1.3)
GLUCOSE SERPL-MCNC: 124 MG/DL — HIGH (ref 70–99)
HCT VFR BLD CALC: 36.4 % — LOW (ref 39–50)
HGB BLD-MCNC: 12.6 G/DL — LOW (ref 13–17)
MCHC RBC-ENTMCNC: 32.2 PG — SIGNIFICANT CHANGE UP (ref 27–34)
MCHC RBC-ENTMCNC: 34.7 GM/DL — SIGNIFICANT CHANGE UP (ref 32–36)
MCV RBC AUTO: 92.7 FL — SIGNIFICANT CHANGE UP (ref 80–100)
PLATELET # BLD AUTO: 186 K/UL — SIGNIFICANT CHANGE UP (ref 150–400)
POTASSIUM SERPL-MCNC: 4 MMOL/L — SIGNIFICANT CHANGE UP (ref 3.5–5.3)
POTASSIUM SERPL-SCNC: 4 MMOL/L — SIGNIFICANT CHANGE UP (ref 3.5–5.3)
RBC # BLD: 3.93 M/UL — LOW (ref 4.2–5.8)
RBC # FLD: 11.4 % — SIGNIFICANT CHANGE UP (ref 10.3–14.5)
SODIUM SERPL-SCNC: 137 MMOL/L — SIGNIFICANT CHANGE UP (ref 135–145)
WBC # BLD: 11.5 K/UL — HIGH (ref 3.8–10.5)
WBC # FLD AUTO: 11.5 K/UL — HIGH (ref 3.8–10.5)

## 2018-01-27 PROCEDURE — 82330 ASSAY OF CALCIUM: CPT

## 2018-01-27 PROCEDURE — 93005 ELECTROCARDIOGRAM TRACING: CPT

## 2018-01-27 PROCEDURE — 99238 HOSP IP/OBS DSCHRG MGMT 30/<: CPT

## 2018-01-27 PROCEDURE — 84484 ASSAY OF TROPONIN QUANT: CPT

## 2018-01-27 PROCEDURE — 82962 GLUCOSE BLOOD TEST: CPT

## 2018-01-27 PROCEDURE — P9041: CPT

## 2018-01-27 PROCEDURE — 82435 ASSAY OF BLOOD CHLORIDE: CPT

## 2018-01-27 PROCEDURE — 82553 CREATINE MB FRACTION: CPT

## 2018-01-27 PROCEDURE — 88311 DECALCIFY TISSUE: CPT

## 2018-01-27 PROCEDURE — C1769: CPT

## 2018-01-27 PROCEDURE — 71045 X-RAY EXAM CHEST 1 VIEW: CPT

## 2018-01-27 PROCEDURE — 85014 HEMATOCRIT: CPT

## 2018-01-27 PROCEDURE — C1889: CPT

## 2018-01-27 PROCEDURE — 88305 TISSUE EXAM BY PATHOLOGIST: CPT

## 2018-01-27 PROCEDURE — P9045: CPT

## 2018-01-27 PROCEDURE — 86900 BLOOD TYPING SEROLOGIC ABO: CPT

## 2018-01-27 PROCEDURE — 85610 PROTHROMBIN TIME: CPT

## 2018-01-27 PROCEDURE — 94002 VENT MGMT INPAT INIT DAY: CPT

## 2018-01-27 PROCEDURE — C1768: CPT

## 2018-01-27 PROCEDURE — 82550 ASSAY OF CK (CPK): CPT

## 2018-01-27 PROCEDURE — 85027 COMPLETE CBC AUTOMATED: CPT

## 2018-01-27 PROCEDURE — 84295 ASSAY OF SERUM SODIUM: CPT

## 2018-01-27 PROCEDURE — 83605 ASSAY OF LACTIC ACID: CPT

## 2018-01-27 PROCEDURE — C1751: CPT

## 2018-01-27 PROCEDURE — 86901 BLOOD TYPING SEROLOGIC RH(D): CPT

## 2018-01-27 PROCEDURE — 86891 AUTOLOGOUS BLOOD OP SALVAGE: CPT

## 2018-01-27 PROCEDURE — 86923 COMPATIBILITY TEST ELECTRIC: CPT

## 2018-01-27 PROCEDURE — 84443 ASSAY THYROID STIM HORMONE: CPT

## 2018-01-27 PROCEDURE — 88304 TISSUE EXAM BY PATHOLOGIST: CPT

## 2018-01-27 PROCEDURE — 82803 BLOOD GASES ANY COMBINATION: CPT

## 2018-01-27 PROCEDURE — 80048 BASIC METABOLIC PNL TOTAL CA: CPT

## 2018-01-27 PROCEDURE — P9016: CPT

## 2018-01-27 PROCEDURE — 97162 PT EVAL MOD COMPLEX 30 MIN: CPT

## 2018-01-27 PROCEDURE — P9047: CPT

## 2018-01-27 PROCEDURE — 82947 ASSAY GLUCOSE BLOOD QUANT: CPT

## 2018-01-27 PROCEDURE — 84132 ASSAY OF SERUM POTASSIUM: CPT

## 2018-01-27 PROCEDURE — 80053 COMPREHEN METABOLIC PANEL: CPT

## 2018-01-27 PROCEDURE — 85730 THROMBOPLASTIN TIME PARTIAL: CPT

## 2018-01-27 PROCEDURE — 84100 ASSAY OF PHOSPHORUS: CPT

## 2018-01-27 RX ORDER — DOCUSATE SODIUM 100 MG
1 CAPSULE ORAL
Qty: 30 | Refills: 0
Start: 2018-01-27 | End: 2018-02-05

## 2018-01-27 RX ORDER — ASPIRIN/CALCIUM CARB/MAGNESIUM 324 MG
1 TABLET ORAL
Qty: 30 | Refills: 0
Start: 2018-01-27 | End: 2018-02-25

## 2018-01-27 RX ORDER — SPIRONOLACTONE 25 MG/1
2 TABLET, FILM COATED ORAL
Qty: 60 | Refills: 0
Start: 2018-01-27 | End: 2018-02-25

## 2018-01-27 RX ORDER — HYDROMORPHONE HYDROCHLORIDE 2 MG/ML
1 INJECTION INTRAMUSCULAR; INTRAVENOUS; SUBCUTANEOUS
Qty: 40 | Refills: 0
Start: 2018-01-27 | End: 2018-02-05

## 2018-01-27 RX ORDER — METOPROLOL TARTRATE 50 MG
1 TABLET ORAL
Qty: 90 | Refills: 0
Start: 2018-01-27 | End: 2018-02-25

## 2018-01-27 RX ORDER — FUROSEMIDE 40 MG
1 TABLET ORAL
Qty: 30 | Refills: 0
Start: 2018-01-27 | End: 2018-02-25

## 2018-01-27 RX ORDER — PANTOPRAZOLE SODIUM 20 MG/1
1 TABLET, DELAYED RELEASE ORAL
Qty: 10 | Refills: 0
Start: 2018-01-27 | End: 2018-02-05

## 2018-01-27 RX ORDER — SIMVASTATIN 20 MG/1
1 TABLET, FILM COATED ORAL
Qty: 30 | Refills: 0
Start: 2018-01-27 | End: 2018-02-25

## 2018-01-27 RX ADMIN — SPIRONOLACTONE 50 MILLIGRAM(S): 25 TABLET, FILM COATED ORAL at 05:52

## 2018-01-27 RX ADMIN — HYDROMORPHONE HYDROCHLORIDE 4 MILLIGRAM(S): 2 INJECTION INTRAMUSCULAR; INTRAVENOUS; SUBCUTANEOUS at 05:57

## 2018-01-27 RX ADMIN — PANTOPRAZOLE SODIUM 40 MILLIGRAM(S): 20 TABLET, DELAYED RELEASE ORAL at 06:18

## 2018-01-27 RX ADMIN — ENOXAPARIN SODIUM 40 MILLIGRAM(S): 100 INJECTION SUBCUTANEOUS at 11:06

## 2018-01-27 RX ADMIN — Medication 325 MILLIGRAM(S): at 11:06

## 2018-01-27 RX ADMIN — Medication 50 MILLIGRAM(S): at 05:52

## 2018-01-27 RX ADMIN — Medication 100 MILLIGRAM(S): at 05:52

## 2018-01-27 RX ADMIN — HYDROMORPHONE HYDROCHLORIDE 4 MILLIGRAM(S): 2 INJECTION INTRAMUSCULAR; INTRAVENOUS; SUBCUTANEOUS at 11:08

## 2018-01-27 RX ADMIN — Medication 40 MILLIGRAM(S): at 05:52

## 2018-01-27 RX ADMIN — HYDROMORPHONE HYDROCHLORIDE 4 MILLIGRAM(S): 2 INJECTION INTRAMUSCULAR; INTRAVENOUS; SUBCUTANEOUS at 11:47

## 2018-02-01 RX ORDER — MUPIROCIN 2 %
2 OINTMENT (GRAM) TOPICAL TWICE DAILY
Refills: 0 | Status: DISCONTINUED | COMMUNITY
End: 2018-02-01

## 2018-02-06 PROBLEM — Z09 POSTOP CHECK: Status: ACTIVE | Noted: 2018-02-01

## 2018-02-09 ENCOUNTER — APPOINTMENT (OUTPATIENT)
Dept: CARDIOTHORACIC SURGERY | Facility: CLINIC | Age: 68
End: 2018-02-09
Payer: COMMERCIAL

## 2018-02-09 VITALS
BODY MASS INDEX: 29.12 KG/M2 | HEART RATE: 100 BPM | HEIGHT: 72 IN | WEIGHT: 215 LBS | DIASTOLIC BLOOD PRESSURE: 94 MMHG | TEMPERATURE: 98.1 F | RESPIRATION RATE: 13 BRPM | OXYGEN SATURATION: 96 % | SYSTOLIC BLOOD PRESSURE: 138 MMHG

## 2018-02-09 DIAGNOSIS — Z09 ENCOUNTER FOR FOLLOW-UP EXAMINATION AFTER COMPLETED TREATMENT FOR CONDITIONS OTHER THAN MALIGNANT NEOPLASM: ICD-10-CM

## 2018-02-09 PROCEDURE — 99024 POSTOP FOLLOW-UP VISIT: CPT

## 2018-02-09 RX ORDER — FUROSEMIDE 40 MG/1
40 TABLET ORAL DAILY
Qty: 30 | Refills: 0 | Status: COMPLETED | COMMUNITY
End: 2018-02-09

## 2018-02-09 RX ORDER — METOPROLOL TARTRATE 50 MG/1
50 TABLET ORAL EVERY 8 HOURS
Refills: 0 | Status: COMPLETED | COMMUNITY
End: 2018-02-09

## 2018-02-09 RX ORDER — SPIRONOLACTONE 50 MG/1
50 TABLET, FILM COATED ORAL
Refills: 0 | Status: COMPLETED | COMMUNITY
End: 2018-02-09

## 2018-02-20 ENCOUNTER — NON-APPOINTMENT (OUTPATIENT)
Age: 68
End: 2018-02-20

## 2018-02-20 ENCOUNTER — APPOINTMENT (OUTPATIENT)
Dept: CARDIOLOGY | Facility: CLINIC | Age: 68
End: 2018-02-20
Payer: COMMERCIAL

## 2018-02-20 VITALS
RESPIRATION RATE: 14 BRPM | HEART RATE: 68 BPM | WEIGHT: 216 LBS | DIASTOLIC BLOOD PRESSURE: 83 MMHG | HEIGHT: 72 IN | SYSTOLIC BLOOD PRESSURE: 137 MMHG | BODY MASS INDEX: 29.26 KG/M2 | OXYGEN SATURATION: 94 %

## 2018-02-20 PROCEDURE — 36415 COLL VENOUS BLD VENIPUNCTURE: CPT

## 2018-02-20 PROCEDURE — 99215 OFFICE O/P EST HI 40 MIN: CPT

## 2018-02-20 PROCEDURE — 93000 ELECTROCARDIOGRAM COMPLETE: CPT

## 2018-02-20 RX ORDER — SPIRONOLACTONE 25 MG/1
25 TABLET ORAL
Qty: 60 | Refills: 0 | Status: DISCONTINUED | COMMUNITY
Start: 2018-01-27

## 2018-02-21 LAB
ALBUMIN SERPL ELPH-MCNC: 4.3 G/DL
ALP BLD-CCNC: 62 U/L
ALT SERPL-CCNC: 30 U/L
ANION GAP SERPL CALC-SCNC: 12 MMOL/L
AST SERPL-CCNC: 22 U/L
BASOPHILS # BLD AUTO: 0.01 K/UL
BASOPHILS NFR BLD AUTO: 0.1 %
BILIRUB SERPL-MCNC: 0.3 MG/DL
BUN SERPL-MCNC: 20 MG/DL
CALCIUM SERPL-MCNC: 9.9 MG/DL
CHLORIDE SERPL-SCNC: 101 MMOL/L
CHOLEST SERPL-MCNC: 149 MG/DL
CHOLEST/HDLC SERPL: 4.7 RATIO
CO2 SERPL-SCNC: 28 MMOL/L
CREAT SERPL-MCNC: 1 MG/DL
EOSINOPHIL # BLD AUTO: 0.29 K/UL
EOSINOPHIL NFR BLD AUTO: 3.1 %
GLUCOSE SERPL-MCNC: 92 MG/DL
HCT VFR BLD CALC: 41.1 %
HDLC SERPL-MCNC: 32 MG/DL
HGB BLD-MCNC: 13.2 G/DL
IMM GRANULOCYTES NFR BLD AUTO: 0.2 %
LDLC SERPL CALC-MCNC: 97 MG/DL
LDLC SERPL DIRECT ASSAY-MCNC: 101 MG/DL
LYMPHOCYTES # BLD AUTO: 2.21 K/UL
LYMPHOCYTES NFR BLD AUTO: 23.3 %
MAN DIFF?: NORMAL
MCHC RBC-ENTMCNC: 29.1 PG
MCHC RBC-ENTMCNC: 32.1 GM/DL
MCV RBC AUTO: 90.7 FL
MONOCYTES # BLD AUTO: 0.8 K/UL
MONOCYTES NFR BLD AUTO: 8.4 %
NEUTROPHILS # BLD AUTO: 6.16 K/UL
NEUTROPHILS NFR BLD AUTO: 64.9 %
PLATELET # BLD AUTO: 274 K/UL
POTASSIUM SERPL-SCNC: 5.8 MMOL/L
PROT SERPL-MCNC: 7.6 G/DL
RBC # BLD: 4.53 M/UL
RBC # FLD: 13.3 %
SODIUM SERPL-SCNC: 141 MMOL/L
TRIGL SERPL-MCNC: 102 MG/DL
WBC # FLD AUTO: 9.49 K/UL

## 2018-02-28 LAB — POTASSIUM SERPL-SCNC: 4.8 MMOL/L

## 2018-03-08 ENCOUNTER — RX RENEWAL (OUTPATIENT)
Age: 68
End: 2018-03-08

## 2018-03-20 ENCOUNTER — NON-APPOINTMENT (OUTPATIENT)
Age: 68
End: 2018-03-20

## 2018-03-20 ENCOUNTER — APPOINTMENT (OUTPATIENT)
Dept: CARDIOLOGY | Facility: CLINIC | Age: 68
End: 2018-03-20
Payer: COMMERCIAL

## 2018-03-20 VITALS
HEIGHT: 72 IN | DIASTOLIC BLOOD PRESSURE: 69 MMHG | RESPIRATION RATE: 14 BRPM | WEIGHT: 224 LBS | SYSTOLIC BLOOD PRESSURE: 141 MMHG | OXYGEN SATURATION: 97 % | HEART RATE: 54 BPM | BODY MASS INDEX: 30.34 KG/M2

## 2018-03-20 DIAGNOSIS — I35.1 NONRHEUMATIC AORTIC (VALVE) INSUFFICIENCY: ICD-10-CM

## 2018-03-20 PROCEDURE — 99214 OFFICE O/P EST MOD 30 MIN: CPT

## 2018-03-20 PROCEDURE — 93000 ELECTROCARDIOGRAM COMPLETE: CPT

## 2018-03-20 RX ORDER — ASPIRIN 325 MG/1
325 TABLET, FILM COATED ORAL DAILY
Qty: 30 | Refills: 1 | Status: COMPLETED | COMMUNITY
End: 2018-03-20

## 2018-03-20 RX ORDER — HYDROMORPHONE HYDROCHLORIDE 2 MG/1
2 TABLET ORAL
Refills: 0 | Status: COMPLETED | COMMUNITY
End: 2018-03-20

## 2018-05-14 ENCOUNTER — APPOINTMENT (OUTPATIENT)
Dept: CARDIOLOGY | Facility: CLINIC | Age: 68
End: 2018-05-14
Payer: COMMERCIAL

## 2018-05-14 ENCOUNTER — NON-APPOINTMENT (OUTPATIENT)
Age: 68
End: 2018-05-14

## 2018-05-14 VITALS
BODY MASS INDEX: 31.29 KG/M2 | HEART RATE: 60 BPM | HEIGHT: 72 IN | SYSTOLIC BLOOD PRESSURE: 142 MMHG | WEIGHT: 231 LBS | DIASTOLIC BLOOD PRESSURE: 84 MMHG | RESPIRATION RATE: 14 BRPM | OXYGEN SATURATION: 95 % | TEMPERATURE: 98 F

## 2018-05-14 PROCEDURE — 99214 OFFICE O/P EST MOD 30 MIN: CPT

## 2018-05-14 PROCEDURE — 93000 ELECTROCARDIOGRAM COMPLETE: CPT

## 2018-05-14 RX ORDER — PANTOPRAZOLE SODIUM 40 MG/1
40 TABLET, DELAYED RELEASE ORAL
Refills: 0 | Status: COMPLETED | COMMUNITY
End: 2018-05-14

## 2018-05-14 RX ORDER — DOCUSATE SODIUM 100 MG/1
100 CAPSULE, LIQUID FILLED ORAL
Refills: 0 | Status: DISCONTINUED | COMMUNITY
End: 2018-05-14

## 2018-06-19 ENCOUNTER — MEDICATION RENEWAL (OUTPATIENT)
Age: 68
End: 2018-06-19

## 2018-11-12 ENCOUNTER — APPOINTMENT (OUTPATIENT)
Dept: CARDIOLOGY | Facility: CLINIC | Age: 68
End: 2018-11-12
Payer: COMMERCIAL

## 2018-11-12 ENCOUNTER — NON-APPOINTMENT (OUTPATIENT)
Age: 68
End: 2018-11-12

## 2018-11-12 VITALS
OXYGEN SATURATION: 98 % | BODY MASS INDEX: 31.47 KG/M2 | SYSTOLIC BLOOD PRESSURE: 159 MMHG | WEIGHT: 232 LBS | DIASTOLIC BLOOD PRESSURE: 88 MMHG | HEART RATE: 64 BPM

## 2018-11-12 PROCEDURE — 93306 TTE W/DOPPLER COMPLETE: CPT

## 2018-11-12 PROCEDURE — 99215 OFFICE O/P EST HI 40 MIN: CPT

## 2018-11-12 PROCEDURE — 93000 ELECTROCARDIOGRAM COMPLETE: CPT

## 2018-11-12 NOTE — HISTORY OF PRESENT ILLNESS
[FreeTextEntry1] : I last saw him in May.  As he returns today, he continues to feel well 10 months s/p repair of the ascending aorta and aortic valve.  He is active, and reports no exertional chest discomfort or dyspnea. He describes no palpitations. He describes no orthopnea or PND.  \par \par He continues on his usual meds.  There have been no new interval medical problems.

## 2018-11-12 NOTE — DISCUSSION/SUMMARY
[FreeTextEntry1] : Aortic valve insufficiency and ascending aortic aneurysm, s/p AVR with bovine prosthesis and repair of Asc aorta on 1/22/18 by Dr. Alvarado.  Reviewed echo, which shows good function of aortic valve.  Size of aortic root unchanged; ascending aorta of normal caliber (4.0 cm.).\par \par Will continues metoprolol XL to 100 mg. qd. and ASA at 81 mg daily.\par \par HTN - BP elevated today; will have him resume enalapril at 5 mg qd (had been on 10 mg pre-op); will increase dose if necessary.\par \par Hyperlipidemia - continues simvastatin at current dose.  \par \par He will return in 6 months.

## 2018-11-12 NOTE — PHYSICAL EXAM

## 2018-11-12 NOTE — ASSESSMENT
[FreeTextEntry1] : Moderate to severe aortic valve insufficiency; ascending aortic aneurysm.\par s/p AVR with bovine prosthesis and repair of Asc aorta on 1/22/18 by Dr. Alvarado.\par \par Mild LV enlargement at end diastole. LVEF remains normal, although mild global systolic dysfunction as seen on echo. \par \par Hyperlipidemia, on simvastatin.\par \par Non-obstructive CAD with 30% RCA lesion at cardiac cath in April 2016 by Dr. DAMI Wilder

## 2018-11-12 NOTE — REASON FOR VISIT
[FreeTextEntry1] : Mesfin Leffe returns  for f/u regarding aortic valve insufficiency and enlargement of the aortic root s/p repair, and HLD.

## 2019-01-28 ENCOUNTER — TRANSCRIPTION ENCOUNTER (OUTPATIENT)
Age: 69
End: 2019-01-28

## 2019-02-26 ENCOUNTER — TRANSCRIPTION ENCOUNTER (OUTPATIENT)
Age: 69
End: 2019-02-26

## 2019-05-13 ENCOUNTER — APPOINTMENT (OUTPATIENT)
Dept: CARDIOLOGY | Facility: CLINIC | Age: 69
End: 2019-05-13
Payer: COMMERCIAL

## 2019-05-13 ENCOUNTER — NON-APPOINTMENT (OUTPATIENT)
Age: 69
End: 2019-05-13

## 2019-05-13 VITALS
DIASTOLIC BLOOD PRESSURE: 60 MMHG | HEIGHT: 72 IN | WEIGHT: 237 LBS | SYSTOLIC BLOOD PRESSURE: 130 MMHG | HEART RATE: 45 BPM | OXYGEN SATURATION: 98 % | BODY MASS INDEX: 32.1 KG/M2

## 2019-05-13 PROCEDURE — 93000 ELECTROCARDIOGRAM COMPLETE: CPT

## 2019-05-13 PROCEDURE — 99214 OFFICE O/P EST MOD 30 MIN: CPT

## 2019-05-13 NOTE — PHYSICAL EXAM
[Normal Appearance] : normal appearance [General Appearance - Well Developed] : well developed [General Appearance - Well Nourished] : well nourished [General Appearance - In No Acute Distress] : no acute distress [Well Groomed] : well groomed [Normal Conjunctiva] : the conjunctiva exhibited no abnormalities [Eyelids - No Xanthelasma] : the eyelids demonstrated no xanthelasmas [Normal Jugular Venous A Waves Present] : normal jugular venous A waves present [Normal Jugular Venous V Waves Present] : normal jugular venous V waves present [Auscultation Breath Sounds / Voice Sounds] : lungs were clear to auscultation bilaterally [Respiration, Rhythm And Depth] : normal respiratory rhythm and effort [Heart Rate And Rhythm] : heart rate and rhythm were normal [Bowel Sounds] : normal bowel sounds [Nail Clubbing] : no clubbing of the fingernails [Abnormal Walk] : normal gait [Cyanosis, Localized] : no localized cyanosis [] : no rash [Skin Color & Pigmentation] : normal skin color and pigmentation [Impaired Insight] : insight and judgment were intact [Oriented To Time, Place, And Person] : oriented to person, place, and time [Mood] : the mood was normal [Affect] : the affect was normal [FreeTextEntry1] : 2+ pulses in the upper and lower extremities. No edema.

## 2019-05-13 NOTE — HISTORY OF PRESENT ILLNESS
[FreeTextEntry1] : I last saw him in November.  As he returns today, he is well from a cardiac standpoint.  He remains active and describes no episodes of exertional chest discomfort or dyspnea. He describes no palpitations. He describes no orthopnea or PND.  \par \par He continues on his usual meds.  \par \par He suffered from a rash earlier this year; it resolved after a short course of steroids.  The cause was unclear.

## 2019-05-13 NOTE — REASON FOR VISIT
[FreeTextEntry1] : \par Mesfin Vargas returns  for f/u regarding aortic valve insufficiency and enlargement of the aortic root s/p repair, and HLD.

## 2019-05-13 NOTE — DISCUSSION/SUMMARY
[FreeTextEntry1] : \par AI and asc. aortic aneurysm, s/p AVR (bovine) & repair of asc aorta on 1/22/18 by Dr. Alvarado.  Recent echo showed good function of aortic valve.  Size of aortic root unchanged; ascending aorta of normal caliber (4.0 cm.).  Will reduce metoprolol XL to 50 mg. qd. due to bradycardia; continues ASA at 81 mg daily.\par \par HTN - BP in good range today on enalapril and beta blocker.\par \par Hyperlipidemia - continues simvastatin at current dose.  \par \par Will check labs today.\par \par Needs new primary MD; referred to Geoff Almonte/Eddie or Geoff Henley/Tommy.\par \par He will return in 6 months.

## 2019-05-14 LAB
ALBUMIN SERPL ELPH-MCNC: 4.3 G/DL
ALP BLD-CCNC: 46 U/L
ALT SERPL-CCNC: 23 U/L
ANION GAP SERPL CALC-SCNC: 11 MMOL/L
AST SERPL-CCNC: 16 U/L
BASOPHILS # BLD AUTO: 0.02 K/UL
BASOPHILS NFR BLD AUTO: 0.2 %
BILIRUB SERPL-MCNC: 0.3 MG/DL
BUN SERPL-MCNC: 21 MG/DL
CALCIUM SERPL-MCNC: 9.3 MG/DL
CHLORIDE SERPL-SCNC: 103 MMOL/L
CHOLEST SERPL-MCNC: 153 MG/DL
CHOLEST/HDLC SERPL: 4.4 RATIO
CO2 SERPL-SCNC: 27 MMOL/L
CREAT SERPL-MCNC: 0.86 MG/DL
EOSINOPHIL # BLD AUTO: 0.23 K/UL
EOSINOPHIL NFR BLD AUTO: 2.8 %
GLUCOSE SERPL-MCNC: 103 MG/DL
HCT VFR BLD CALC: 46.7 %
HDLC SERPL-MCNC: 35 MG/DL
HGB BLD-MCNC: 15 G/DL
IMM GRANULOCYTES NFR BLD AUTO: 0.1 %
LDLC SERPL CALC-MCNC: 85 MG/DL
LDLC SERPL DIRECT ASSAY-MCNC: 92 MG/DL
LYMPHOCYTES # BLD AUTO: 2.86 K/UL
LYMPHOCYTES NFR BLD AUTO: 34.3 %
MAN DIFF?: NORMAL
MCHC RBC-ENTMCNC: 29.8 PG
MCHC RBC-ENTMCNC: 32.1 GM/DL
MCV RBC AUTO: 92.7 FL
MONOCYTES # BLD AUTO: 0.69 K/UL
MONOCYTES NFR BLD AUTO: 8.3 %
NEUTROPHILS # BLD AUTO: 4.54 K/UL
NEUTROPHILS NFR BLD AUTO: 54.3 %
PLATELET # BLD AUTO: 224 K/UL
POTASSIUM SERPL-SCNC: 4.8 MMOL/L
PROT SERPL-MCNC: 7 G/DL
RBC # BLD: 5.04 M/UL
RBC # FLD: 13 %
SODIUM SERPL-SCNC: 141 MMOL/L
TRIGL SERPL-MCNC: 164 MG/DL
WBC # FLD AUTO: 8.35 K/UL

## 2019-09-06 ENCOUNTER — MEDICATION RENEWAL (OUTPATIENT)
Age: 69
End: 2019-09-06

## 2019-11-11 ENCOUNTER — APPOINTMENT (OUTPATIENT)
Dept: CARDIOLOGY | Facility: CLINIC | Age: 69
End: 2019-11-11
Payer: COMMERCIAL

## 2019-11-11 ENCOUNTER — NON-APPOINTMENT (OUTPATIENT)
Age: 69
End: 2019-11-11

## 2019-11-11 VITALS
HEIGHT: 72 IN | BODY MASS INDEX: 31.56 KG/M2 | WEIGHT: 233 LBS | HEART RATE: 65 BPM | OXYGEN SATURATION: 96 % | SYSTOLIC BLOOD PRESSURE: 128 MMHG | DIASTOLIC BLOOD PRESSURE: 76 MMHG | RESPIRATION RATE: 17 BRPM | TEMPERATURE: 97.9 F

## 2019-11-11 PROCEDURE — 99214 OFFICE O/P EST MOD 30 MIN: CPT

## 2019-11-11 PROCEDURE — 93000 ELECTROCARDIOGRAM COMPLETE: CPT

## 2019-11-11 NOTE — HISTORY OF PRESENT ILLNESS
[FreeTextEntry1] : I saw him last in May.  As he returns today, he remains well from a cardiac standpoint.  He continues his usual activities; there have been no episodes of exertional chest discomfort or dyspnea. He describes no palpitations. He reports no orthopnea or PND.  \par \par He continues on his usual meds.  \par \par There have been no new interval medical problems. Medications are unchanged.

## 2019-11-11 NOTE — ASSESSMENT
[FreeTextEntry1] : Moderate to severe aortic valve insufficiency; ascending aortic aneurysm.\par s/p AVR with bovine prosthesis and repair of Asc aorta on 1/22/18 by Dr. Alvarado.\par \par Mild LV enlargement at end diastole. LVEF remains normal, although mild global systolic dysfunction as seen on echo. \par \par HTN\par \par Hyperlipidemia, on simvastatin.\par \par Non-obstructive CAD with 30% RCA lesion at cardiac cath in April 2016 by Dr. DAMI Wilder

## 2019-11-11 NOTE — PHYSICAL EXAM
[Normal Appearance] : normal appearance [Well Groomed] : well groomed [General Appearance - Well Developed] : well developed [General Appearance - Well Nourished] : well nourished [General Appearance - In No Acute Distress] : no acute distress [Normal Conjunctiva] : the conjunctiva exhibited no abnormalities [Eyelids - No Xanthelasma] : the eyelids demonstrated no xanthelasmas [Normal Jugular Venous V Waves Present] : normal jugular venous V waves present [Normal Jugular Venous A Waves Present] : normal jugular venous A waves present [Respiration, Rhythm And Depth] : normal respiratory rhythm and effort [Auscultation Breath Sounds / Voice Sounds] : lungs were clear to auscultation bilaterally [Heart Rate And Rhythm] : heart rate and rhythm were normal [Bowel Sounds] : normal bowel sounds [Nail Clubbing] : no clubbing of the fingernails [Cyanosis, Localized] : no localized cyanosis [Abnormal Walk] : normal gait [Skin Color & Pigmentation] : normal skin color and pigmentation [] : no rash [Impaired Insight] : insight and judgment were intact [Oriented To Time, Place, And Person] : oriented to person, place, and time [Mood] : the mood was normal [Affect] : the affect was normal [FreeTextEntry1] : 2+ pulses in the upper and lower extremities. No edema.

## 2019-11-11 NOTE — DISCUSSION/SUMMARY
[FreeTextEntry1] : \par AI and asc. aortic aneurysm, s/p AVR (bovine) & repair of asc aorta on 1/22/18 by Dr. Alvarado.  Continue metoprolol XL to 50 mg. qd. & ASA 81 mg daily.\par \par HTN - BP in good range today on enalapril and beta blocker.\par \par Hyperlipidemia - continues simvastatin at current dose.  \par \par Flu vaccine given today.\par \par He will return in 6 months.  Will arrange TTE at that time to re-assess aortic valve and ascending aorta.

## 2020-05-12 ENCOUNTER — APPOINTMENT (OUTPATIENT)
Dept: CARDIOLOGY | Facility: CLINIC | Age: 70
End: 2020-05-12

## 2020-05-12 NOTE — HISTORY OF PRESENT ILLNESS
[FreeTextEntry1] : I saw him last in November.  As he returns today, \par \par \par he remains well from a cardiac standpoint.  He continues his usual activities; there have been no episodes of exertional chest discomfort or dyspnea. He describes no palpitations. He reports no orthopnea or PND.  \par \par He continues on his usual meds.  \par \par There have been no new interval medical problems. Medications are unchanged.

## 2020-05-12 NOTE — REASON FOR VISIT
[FreeTextEntry1] : PRELIMINARY NOTE\par \par Mesfin Vargas returns  for f/u regarding aortic valve insufficiency and enlargement of the aortic root s/p repair, and HLD.

## 2020-05-12 NOTE — PHYSICAL EXAM

## 2020-05-12 NOTE — DISCUSSION/SUMMARY
[FreeTextEntry1] : PRELIMINARY NOTE\par \par \par AI and asc. aortic aneurysm, s/p AVR (bovine) & repair of asc aorta on 1/22/18 by Dr. Alvarado.  Continue metoprolol XL to 50 mg. qd. & ASA 81 mg daily.\par \par HTN - BP in good range today on enalapril and beta blocker.\par \par Hyperlipidemia - continues simvastatin at current dose.  \par \par Flu vaccine given today.\par \par He will return in 6 months.  Will arrange TTE at that time to re-assess aortic valve and ascending aorta.

## 2020-05-26 ENCOUNTER — APPOINTMENT (OUTPATIENT)
Dept: CARDIOLOGY | Facility: CLINIC | Age: 70
End: 2020-05-26
Payer: COMMERCIAL

## 2020-05-26 ENCOUNTER — NON-APPOINTMENT (OUTPATIENT)
Age: 70
End: 2020-05-26

## 2020-05-26 VITALS
BODY MASS INDEX: 31.19 KG/M2 | HEART RATE: 54 BPM | TEMPERATURE: 98 F | WEIGHT: 230 LBS | DIASTOLIC BLOOD PRESSURE: 70 MMHG | OXYGEN SATURATION: 99 % | SYSTOLIC BLOOD PRESSURE: 120 MMHG

## 2020-05-26 PROCEDURE — 93306 TTE W/DOPPLER COMPLETE: CPT

## 2020-05-26 PROCEDURE — 93000 ELECTROCARDIOGRAM COMPLETE: CPT

## 2020-05-26 PROCEDURE — 36415 COLL VENOUS BLD VENIPUNCTURE: CPT

## 2020-05-26 PROCEDURE — 99214 OFFICE O/P EST MOD 30 MIN: CPT

## 2020-05-26 NOTE — DISCUSSION/SUMMARY
[FreeTextEntry1] : \par AI and asc. aortic aneurysm, s/p AVR (bovine) & repair of asc aorta on 1/22/18 by Dr. Alvarado.  TTE today shows stable findings.  Continue metoprolol XL to 50 mg. qd. & ASA 81 mg daily.\par \par HTN - BP in good range today on enalapril and beta blocker.\par \par Hyperlipidemia - continues simvastatin at current dose.  \par \par Will check blood work; sent in med renewals.\par \par He will return in 6 months.

## 2020-05-26 NOTE — HISTORY OF PRESENT ILLNESS
[FreeTextEntry1] : I saw him last in November.  As he returns today, he has remained well from a cardiac standpoint.  He continues his usual activities; there have been no episodes of exertional chest discomfort or dyspnea. He describes no palpitations. He reports no orthopnea or PND.  \par \par He continues on his usual meds.  \par \par There have been no new interval medical problems. Medications are unchanged.

## 2020-05-26 NOTE — PHYSICAL EXAM
[General Appearance - Well Developed] : well developed [Well Groomed] : well groomed [Normal Appearance] : normal appearance [General Appearance - Well Nourished] : well nourished [Normal Conjunctiva] : the conjunctiva exhibited no abnormalities [General Appearance - In No Acute Distress] : no acute distress [Eyelids - No Xanthelasma] : the eyelids demonstrated no xanthelasmas [Normal Jugular Venous A Waves Present] : normal jugular venous A waves present [Normal Jugular Venous V Waves Present] : normal jugular venous V waves present [Auscultation Breath Sounds / Voice Sounds] : lungs were clear to auscultation bilaterally [Respiration, Rhythm And Depth] : normal respiratory rhythm and effort [Heart Rate And Rhythm] : heart rate and rhythm were normal [Bowel Sounds] : normal bowel sounds [Abnormal Walk] : normal gait [Nail Clubbing] : no clubbing of the fingernails [Skin Color & Pigmentation] : normal skin color and pigmentation [Cyanosis, Localized] : no localized cyanosis [] : no rash [Oriented To Time, Place, And Person] : oriented to person, place, and time [Mood] : the mood was normal [Affect] : the affect was normal [Impaired Insight] : insight and judgment were intact [FreeTextEntry1] : 2+ pulses in the upper and lower extremities. No edema.

## 2020-05-27 LAB
ALBUMIN SERPL ELPH-MCNC: 4.3 G/DL
ALP BLD-CCNC: 52 U/L
ALT SERPL-CCNC: 18 U/L
ANION GAP SERPL CALC-SCNC: 10 MMOL/L
AST SERPL-CCNC: 18 U/L
BASOPHILS # BLD AUTO: 0.02 K/UL
BASOPHILS NFR BLD AUTO: 0.2 %
BILIRUB SERPL-MCNC: 0.6 MG/DL
BUN SERPL-MCNC: 21 MG/DL
CALCIUM SERPL-MCNC: 9.1 MG/DL
CHLORIDE SERPL-SCNC: 101 MMOL/L
CHOLEST SERPL-MCNC: 151 MG/DL
CHOLEST/HDLC SERPL: 4 RATIO
CO2 SERPL-SCNC: 28 MMOL/L
CREAT SERPL-MCNC: 0.94 MG/DL
EOSINOPHIL # BLD AUTO: 0.14 K/UL
EOSINOPHIL NFR BLD AUTO: 1.3 %
GLUCOSE SERPL-MCNC: 106 MG/DL
HCT VFR BLD CALC: 45.1 %
HDLC SERPL-MCNC: 38 MG/DL
HGB BLD-MCNC: 14.4 G/DL
IMM GRANULOCYTES NFR BLD AUTO: 0.3 %
LDLC SERPL CALC-MCNC: 64 MG/DL
LDLC SERPL DIRECT ASSAY-MCNC: 81 MG/DL
LYMPHOCYTES # BLD AUTO: 2.15 K/UL
LYMPHOCYTES NFR BLD AUTO: 19.8 %
MAN DIFF?: NORMAL
MCHC RBC-ENTMCNC: 29.5 PG
MCHC RBC-ENTMCNC: 31.9 GM/DL
MCV RBC AUTO: 92.4 FL
MONOCYTES # BLD AUTO: 0.85 K/UL
MONOCYTES NFR BLD AUTO: 7.8 %
NEUTROPHILS # BLD AUTO: 7.65 K/UL
NEUTROPHILS NFR BLD AUTO: 70.6 %
PLATELET # BLD AUTO: 213 K/UL
POTASSIUM SERPL-SCNC: 4.6 MMOL/L
PROT SERPL-MCNC: 6.8 G/DL
RBC # BLD: 4.88 M/UL
RBC # FLD: 13.2 %
SODIUM SERPL-SCNC: 140 MMOL/L
TRIGL SERPL-MCNC: 245 MG/DL
WBC # FLD AUTO: 10.84 K/UL

## 2020-06-30 ENCOUNTER — APPOINTMENT (OUTPATIENT)
Dept: CARDIOLOGY | Facility: CLINIC | Age: 70
End: 2020-06-30

## 2020-11-24 ENCOUNTER — APPOINTMENT (OUTPATIENT)
Dept: CARDIOLOGY | Facility: CLINIC | Age: 70
End: 2020-11-24
Payer: COMMERCIAL

## 2020-11-24 ENCOUNTER — NON-APPOINTMENT (OUTPATIENT)
Age: 70
End: 2020-11-24

## 2020-11-24 VITALS
DIASTOLIC BLOOD PRESSURE: 70 MMHG | HEIGHT: 72 IN | BODY MASS INDEX: 30.75 KG/M2 | SYSTOLIC BLOOD PRESSURE: 124 MMHG | HEART RATE: 54 BPM | TEMPERATURE: 97.6 F | WEIGHT: 227 LBS

## 2020-11-24 DIAGNOSIS — Z95.828 PRESENCE OF OTHER VASCULAR IMPLANTS AND GRAFTS: ICD-10-CM

## 2020-11-24 LAB
ALBUMIN SERPL ELPH-MCNC: 4.6 G/DL
ALP BLD-CCNC: 52 U/L
ALT SERPL-CCNC: 18 U/L
ANION GAP SERPL CALC-SCNC: 9 MMOL/L
AST SERPL-CCNC: 18 U/L
BASOPHILS # BLD AUTO: 0.02 K/UL
BASOPHILS NFR BLD AUTO: 0.2 %
BILIRUB SERPL-MCNC: 0.4 MG/DL
BUN SERPL-MCNC: 16 MG/DL
CALCIUM SERPL-MCNC: 9.3 MG/DL
CHLORIDE SERPL-SCNC: 103 MMOL/L
CHOLEST SERPL-MCNC: 158 MG/DL
CO2 SERPL-SCNC: 27 MMOL/L
CREAT SERPL-MCNC: 0.91 MG/DL
EOSINOPHIL # BLD AUTO: 0.12 K/UL
EOSINOPHIL NFR BLD AUTO: 1.3 %
GLUCOSE SERPL-MCNC: 108 MG/DL
HCT VFR BLD CALC: 45.3 %
HDLC SERPL-MCNC: 40 MG/DL
HGB BLD-MCNC: 14.9 G/DL
IMM GRANULOCYTES NFR BLD AUTO: 0.3 %
LDLC SERPL CALC-MCNC: 83 MG/DL
LDLC SERPL DIRECT ASSAY-MCNC: 93 MG/DL
LYMPHOCYTES # BLD AUTO: 2.68 K/UL
LYMPHOCYTES NFR BLD AUTO: 28.8 %
MAN DIFF?: NORMAL
MCHC RBC-ENTMCNC: 30.3 PG
MCHC RBC-ENTMCNC: 32.9 GM/DL
MCV RBC AUTO: 92.3 FL
MONOCYTES # BLD AUTO: 0.58 K/UL
MONOCYTES NFR BLD AUTO: 6.2 %
NEUTROPHILS # BLD AUTO: 5.86 K/UL
NEUTROPHILS NFR BLD AUTO: 63.2 %
NONHDLC SERPL-MCNC: 118 MG/DL
PLATELET # BLD AUTO: 218 K/UL
POTASSIUM SERPL-SCNC: 3.8 MMOL/L
PROT SERPL-MCNC: 7.1 G/DL
RBC # BLD: 4.91 M/UL
RBC # FLD: 12.6 %
SODIUM SERPL-SCNC: 139 MMOL/L
TRIGL SERPL-MCNC: 172 MG/DL
WBC # FLD AUTO: 9.29 K/UL

## 2020-11-24 PROCEDURE — 99214 OFFICE O/P EST MOD 30 MIN: CPT

## 2020-11-24 PROCEDURE — 36415 COLL VENOUS BLD VENIPUNCTURE: CPT

## 2020-11-24 PROCEDURE — 93000 ELECTROCARDIOGRAM COMPLETE: CPT

## 2020-11-24 NOTE — DISCUSSION/SUMMARY
[FreeTextEntry1] : \par AI and asc. aortic aneurysm, s/p AVR (bovine) & repair of asc aorta on 1/22/18 by Dr. Alvarado.  TTE today shows stable findings.  Continue metoprolol XL 50 mg. qd. & ASA 81 mg daily.\par \par HTN - BP in good range today on enalapril and beta blocker.\par \par Hyperlipidemia - continues simvastatin at current dose.  \par \par Will check blood work; sent in med renewals.\par \par He will return in 6 months.

## 2020-11-24 NOTE — HISTORY OF PRESENT ILLNESS
[FreeTextEntry1] : I saw him last in May.  Since that time, he remains well from a cardiac standpoint.  He continues his usual activities and describes no episodes of exertional chest discomfort or dyspnea. He reports no palpitations. He reports no orthopnea or PND.  \par \par He continues on his usual meds.   There have been no new interval medical problems.

## 2021-03-16 ENCOUNTER — TRANSCRIPTION ENCOUNTER (OUTPATIENT)
Age: 71
End: 2021-03-16

## 2021-05-26 ENCOUNTER — NON-APPOINTMENT (OUTPATIENT)
Age: 71
End: 2021-05-26

## 2021-05-26 ENCOUNTER — APPOINTMENT (OUTPATIENT)
Dept: CARDIOLOGY | Facility: CLINIC | Age: 71
End: 2021-05-26
Payer: COMMERCIAL

## 2021-05-26 VITALS
RESPIRATION RATE: 14 BRPM | BODY MASS INDEX: 31.47 KG/M2 | SYSTOLIC BLOOD PRESSURE: 132 MMHG | WEIGHT: 232 LBS | OXYGEN SATURATION: 96 % | DIASTOLIC BLOOD PRESSURE: 90 MMHG | HEART RATE: 72 BPM

## 2021-05-26 PROCEDURE — 99072 ADDL SUPL MATRL&STAF TM PHE: CPT

## 2021-05-26 PROCEDURE — 93000 ELECTROCARDIOGRAM COMPLETE: CPT

## 2021-05-26 PROCEDURE — 36415 COLL VENOUS BLD VENIPUNCTURE: CPT

## 2021-05-26 PROCEDURE — 99214 OFFICE O/P EST MOD 30 MIN: CPT

## 2021-05-26 NOTE — HISTORY OF PRESENT ILLNESS
[FreeTextEntry1] : I saw him last in November.  Since that time, he continues his usual activity and he has been stable from a cardiac standpoint.  There have been no episodes of exertional chest discomfort or dyspnea. He describes no palpitations, and no episodes of orthopnea or PND.  \par \par He continues on his usual meds.   There have been no new interval medical problems.

## 2021-05-26 NOTE — PHYSICAL EXAM
[General Appearance - Well Developed] : well developed [Normal Appearance] : normal appearance [Well Groomed] : well groomed [General Appearance - Well Nourished] : well nourished [General Appearance - In No Acute Distress] : no acute distress [Normal Conjunctiva] : the conjunctiva exhibited no abnormalities [Eyelids - No Xanthelasma] : the eyelids demonstrated no xanthelasmas [Normal Jugular Venous A Waves Present] : normal jugular venous A waves present [Normal Jugular Venous V Waves Present] : normal jugular venous V waves present [Auscultation Breath Sounds / Voice Sounds] : lungs were clear to auscultation bilaterally [Respiration, Rhythm And Depth] : normal respiratory rhythm and effort [Heart Rate And Rhythm] : heart rate and rhythm were normal [Bowel Sounds] : normal bowel sounds [Abnormal Walk] : normal gait [Nail Clubbing] : no clubbing of the fingernails [Cyanosis, Localized] : no localized cyanosis [Skin Color & Pigmentation] : normal skin color and pigmentation [] : no rash [Oriented To Time, Place, And Person] : oriented to person, place, and time [Impaired Insight] : insight and judgment were intact [Affect] : the affect was normal [Mood] : the mood was normal [FreeTextEntry1] : 2+ pulses in the upper and lower extremities. No edema.

## 2021-05-26 NOTE — DISCUSSION/SUMMARY
[FreeTextEntry1] : \par AI and asc. aortic aneurysm, s/p AVR (bovine) & repair of asc aorta on 1/22/18 by Dr. Alvarado.  Continue metoprolol XL 50 mg. qd. & ASA 81 mg daily.\par \par HTN - May have missed ACE I dose this morning; to continue enalapril and beta blocker.\par \par Hyperlipidemia - continues simvastatin at current dose.  \par \par Will check blood work; sent in med renewals.\par \par He will return in 6 months.   Will arrange TTE with next visit to re-assess ascending aorta and prosthetic valve.

## 2021-05-30 LAB
ALBUMIN SERPL ELPH-MCNC: 4.6 G/DL
ALP BLD-CCNC: 49 U/L
ALT SERPL-CCNC: 17 U/L
ANION GAP SERPL CALC-SCNC: 12 MMOL/L
AST SERPL-CCNC: 20 U/L
BASOPHILS # BLD AUTO: 0.02 K/UL
BASOPHILS NFR BLD AUTO: 0.2 %
BILIRUB SERPL-MCNC: 0.5 MG/DL
BUN SERPL-MCNC: 21 MG/DL
CALCIUM SERPL-MCNC: 9.1 MG/DL
CHLORIDE SERPL-SCNC: 104 MMOL/L
CHOLEST SERPL-MCNC: 144 MG/DL
CO2 SERPL-SCNC: 26 MMOL/L
CREAT SERPL-MCNC: 1.12 MG/DL
EOSINOPHIL # BLD AUTO: 0.14 K/UL
EOSINOPHIL NFR BLD AUTO: 1.7 %
GLUCOSE SERPL-MCNC: 106 MG/DL
HCT VFR BLD CALC: 43.9 %
HDLC SERPL-MCNC: 41 MG/DL
HGB BLD-MCNC: 14.3 G/DL
IMM GRANULOCYTES NFR BLD AUTO: 0.2 %
LDLC SERPL CALC-MCNC: 75 MG/DL
LDLC SERPL DIRECT ASSAY-MCNC: 81 MG/DL
LYMPHOCYTES # BLD AUTO: 2.48 K/UL
LYMPHOCYTES NFR BLD AUTO: 30.7 %
MAN DIFF?: NORMAL
MCHC RBC-ENTMCNC: 29.9 PG
MCHC RBC-ENTMCNC: 32.6 GM/DL
MCV RBC AUTO: 91.8 FL
MONOCYTES # BLD AUTO: 0.57 K/UL
MONOCYTES NFR BLD AUTO: 7.1 %
NEUTROPHILS # BLD AUTO: 4.84 K/UL
NEUTROPHILS NFR BLD AUTO: 60.1 %
NONHDLC SERPL-MCNC: 103 MG/DL
PLATELET # BLD AUTO: 206 K/UL
POTASSIUM SERPL-SCNC: 4.2 MMOL/L
PROT SERPL-MCNC: 7.2 G/DL
RBC # BLD: 4.78 M/UL
RBC # FLD: 12.7 %
SODIUM SERPL-SCNC: 142 MMOL/L
TRIGL SERPL-MCNC: 141 MG/DL
WBC # FLD AUTO: 8.07 K/UL

## 2021-11-17 ENCOUNTER — APPOINTMENT (OUTPATIENT)
Dept: CARDIOLOGY | Facility: CLINIC | Age: 71
End: 2021-11-17
Payer: COMMERCIAL

## 2021-11-17 ENCOUNTER — NON-APPOINTMENT (OUTPATIENT)
Age: 71
End: 2021-11-17

## 2021-11-17 VITALS
HEART RATE: 54 BPM | DIASTOLIC BLOOD PRESSURE: 76 MMHG | BODY MASS INDEX: 31.42 KG/M2 | OXYGEN SATURATION: 96 % | WEIGHT: 232 LBS | SYSTOLIC BLOOD PRESSURE: 120 MMHG | HEIGHT: 72 IN | RESPIRATION RATE: 17 BRPM

## 2021-11-17 DIAGNOSIS — Z23 ENCOUNTER FOR IMMUNIZATION: ICD-10-CM

## 2021-11-17 PROCEDURE — 93000 ELECTROCARDIOGRAM COMPLETE: CPT

## 2021-11-17 PROCEDURE — 99214 OFFICE O/P EST MOD 30 MIN: CPT

## 2021-11-17 PROCEDURE — 93306 TTE W/DOPPLER COMPLETE: CPT

## 2021-11-17 NOTE — DISCUSSION/SUMMARY
[FreeTextEntry1] : Hx. of aortic insufficiency,associated with asc. aortic aneurysm; s/p AVR (bovine) & repair of asc. aorta on 1/22/18 by Dr. Alvarado.  TTE today shows normal function of aortic valve and stable size of aortic root and ascending aorta.  To continue metoprolol, enalapril & ASA 81 mg daily.\par \par HTN - in good range; continue enalapril and beta blocker at current dosage.\par \par HLD - continues simvastatin at current dose.  Favorable lipid profile when checked in spring.\par \par Flu vaccine administered.\par \par 30 minutes spent on today's office visit. \par \par He will return in 6 months.

## 2021-11-17 NOTE — HISTORY OF PRESENT ILLNESS
[FreeTextEntry1] : Since I saw him last, he remains active and well.  There have been no cardiac sxs.; he describes been no episodes of exertional chest discomfort or LESTER. He reports no palpitations.  There have been no episodes of orthopnea or PND.  \par \par He continues on his usual meds.   There have been no new interval medical problems.

## 2021-12-06 ENCOUNTER — RX RENEWAL (OUTPATIENT)
Age: 71
End: 2021-12-06

## 2021-12-14 ENCOUNTER — RX RENEWAL (OUTPATIENT)
Age: 71
End: 2021-12-14

## 2022-01-27 ENCOUNTER — RX RENEWAL (OUTPATIENT)
Age: 72
End: 2022-01-27

## 2022-02-12 ENCOUNTER — RX RENEWAL (OUTPATIENT)
Age: 72
End: 2022-02-12

## 2022-05-23 ENCOUNTER — NON-APPOINTMENT (OUTPATIENT)
Age: 72
End: 2022-05-23

## 2022-05-23 ENCOUNTER — APPOINTMENT (OUTPATIENT)
Dept: CARDIOLOGY | Facility: CLINIC | Age: 72
End: 2022-05-23
Payer: COMMERCIAL

## 2022-05-23 VITALS
BODY MASS INDEX: 31.02 KG/M2 | WEIGHT: 229 LBS | HEART RATE: 53 BPM | SYSTOLIC BLOOD PRESSURE: 147 MMHG | HEIGHT: 72 IN | RESPIRATION RATE: 15 BRPM | OXYGEN SATURATION: 98 % | DIASTOLIC BLOOD PRESSURE: 70 MMHG

## 2022-05-23 PROCEDURE — 93000 ELECTROCARDIOGRAM COMPLETE: CPT

## 2022-05-23 PROCEDURE — 99214 OFFICE O/P EST MOD 30 MIN: CPT

## 2022-05-23 NOTE — HISTORY OF PRESENT ILLNESS
[FreeTextEntry1] : Since I saw him last, he has been well.  He continues his usual activities and describes no cardiac sxs.  There have been no episodes of exertional chest discomfort or LESTER. He describes no palpitations.  There have been no episodes of orthopnea or PND.  \par \par Medications are unchanged.  There have been no new interval medical problems.

## 2022-05-23 NOTE — DISCUSSION/SUMMARY
[FreeTextEntry1] : HTN - in reasonable range.  He admits that he sometimes forgets to take his medications.  I reminded him of the importance of taking them daily; he will continue on the current dosage.\par \par HLD -he will also continue simvastatin at current dose. \par \par Hx. of aortic insufficiency,associated with asc. aortic aneurysm; s/p AVR (bovine) & repair of asc. aorta on 1/22/18 by Dr. Alvarado.  TTE last Nov. showed normal function of aortic valve and stable size of aortic root and ascending aorta.  He will continue metoprolol, enalapril & ASA 81 mg daily.\par \par Will send out blood tests today.\par \par 31 minutes spent on today's office visit. \par \par He will return in 6 months.

## 2022-05-24 LAB
ALBUMIN SERPL ELPH-MCNC: 4.6 G/DL
ALP BLD-CCNC: 49 U/L
ALT SERPL-CCNC: 16 U/L
ANION GAP SERPL CALC-SCNC: 11 MMOL/L
AST SERPL-CCNC: 19 U/L
BASOPHILS # BLD AUTO: 0.03 K/UL
BASOPHILS NFR BLD AUTO: 0.4 %
BILIRUB SERPL-MCNC: 0.5 MG/DL
BUN SERPL-MCNC: 19 MG/DL
CALCIUM SERPL-MCNC: 9.2 MG/DL
CHLORIDE SERPL-SCNC: 103 MMOL/L
CHOLEST SERPL-MCNC: 167 MG/DL
CK SERPL-CCNC: 148 U/L
CO2 SERPL-SCNC: 28 MMOL/L
CREAT SERPL-MCNC: 0.86 MG/DL
EGFR: 91 ML/MIN/1.73M2
EOSINOPHIL # BLD AUTO: 0.16 K/UL
EOSINOPHIL NFR BLD AUTO: 1.9 %
GLUCOSE SERPL-MCNC: 103 MG/DL
HCT VFR BLD CALC: 44.9 %
HDLC SERPL-MCNC: 40 MG/DL
HGB BLD-MCNC: 14.7 G/DL
IMM GRANULOCYTES NFR BLD AUTO: 0.2 %
LDLC SERPL CALC-MCNC: 91 MG/DL
LDLC SERPL DIRECT ASSAY-MCNC: 98 MG/DL
LYMPHOCYTES # BLD AUTO: 2.61 K/UL
LYMPHOCYTES NFR BLD AUTO: 30.7 %
MAN DIFF?: NORMAL
MCHC RBC-ENTMCNC: 29.9 PG
MCHC RBC-ENTMCNC: 32.7 GM/DL
MCV RBC AUTO: 91.3 FL
MONOCYTES # BLD AUTO: 0.67 K/UL
MONOCYTES NFR BLD AUTO: 7.9 %
NEUTROPHILS # BLD AUTO: 5 K/UL
NEUTROPHILS NFR BLD AUTO: 58.9 %
NONHDLC SERPL-MCNC: 127 MG/DL
PLATELET # BLD AUTO: 201 K/UL
POTASSIUM SERPL-SCNC: 4 MMOL/L
PROT SERPL-MCNC: 7.2 G/DL
RBC # BLD: 4.92 M/UL
RBC # FLD: 12.9 %
SODIUM SERPL-SCNC: 142 MMOL/L
TRIGL SERPL-MCNC: 180 MG/DL
WBC # FLD AUTO: 8.49 K/UL

## 2022-09-15 ENCOUNTER — RX RENEWAL (OUTPATIENT)
Age: 72
End: 2022-09-15

## 2022-11-21 ENCOUNTER — APPOINTMENT (OUTPATIENT)
Dept: CARDIOLOGY | Facility: CLINIC | Age: 72
End: 2022-11-21

## 2022-11-21 ENCOUNTER — NON-APPOINTMENT (OUTPATIENT)
Age: 72
End: 2022-11-21

## 2022-11-21 VITALS
WEIGHT: 227 LBS | RESPIRATION RATE: 17 BRPM | HEART RATE: 51 BPM | HEIGHT: 72 IN | BODY MASS INDEX: 30.75 KG/M2 | SYSTOLIC BLOOD PRESSURE: 132 MMHG | DIASTOLIC BLOOD PRESSURE: 86 MMHG | OXYGEN SATURATION: 96 %

## 2022-11-21 PROCEDURE — 93000 ELECTROCARDIOGRAM COMPLETE: CPT

## 2022-11-21 PROCEDURE — 36415 COLL VENOUS BLD VENIPUNCTURE: CPT

## 2022-11-21 PROCEDURE — 99214 OFFICE O/P EST MOD 30 MIN: CPT

## 2022-11-21 NOTE — DISCUSSION/SUMMARY
[FreeTextEntry1] : HTN - in reasonable range.  Continue enalapril. \par \par HLD - continue simvastatin at current dose. \par \par Hx. of aortic insufficiency,associated with asc. aortic aneurysm; s/p AVR (bovine) & repair of asc. aorta on 1/22/18 by Dr. Alvarado.  TTE Nov. 2021 showed normal function of aortic valve and stable size of aortic root and ascending aorta.  \par Continue metoprolol, enalapril & ASA 81 mg daily.\par \par Will send out blood tests today.\par \par 32 minutes spent on today's office visit. \par \par He will return in 6 months.  [EKG obtained to assist in diagnosis and management of assessed problem(s)] : EKG obtained to assist in diagnosis and management of assessed problem(s)

## 2022-11-21 NOTE — PHYSICAL EXAM
[General Appearance - Well Developed] : well developed [Well Groomed] : well groomed [Normal Appearance] : normal appearance [General Appearance - Well Nourished] : well nourished [General Appearance - In No Acute Distress] : no acute distress [Normal Conjunctiva] : the conjunctiva exhibited no abnormalities [Eyelids - No Xanthelasma] : the eyelids demonstrated no xanthelasmas [Normal Jugular Venous A Waves Present] : normal jugular venous A waves present [Normal Jugular Venous V Waves Present] : normal jugular venous V waves present [Respiration, Rhythm And Depth] : normal respiratory rhythm and effort [Auscultation Breath Sounds / Voice Sounds] : lungs were clear to auscultation bilaterally [Heart Rate And Rhythm] : heart rate and rhythm were normal [Bowel Sounds] : normal bowel sounds [Abnormal Walk] : normal gait [Nail Clubbing] : no clubbing of the fingernails [Cyanosis, Localized] : no localized cyanosis [Skin Color & Pigmentation] : normal skin color and pigmentation [] : no rash [Oriented To Time, Place, And Person] : oriented to person, place, and time [Impaired Insight] : insight and judgment were intact [Affect] : the affect was normal [Mood] : the mood was normal [FreeTextEntry1] : 2+ pulses in the upper and lower extremities. No edema.

## 2022-11-21 NOTE — HISTORY OF PRESENT ILLNESS
[FreeTextEntry1] : Since I saw him last, he remains well.  He is able to continue his usual activities without problem.  He describes no cardiac sxs. - no episodes of exertional chest discomfort or LESTER. He reports no palpitations, and no episodes of orthopnea or PND.  \par \par Medications are unchanged.  There have been no new interval medical problems.

## 2022-11-27 LAB
ALBUMIN SERPL ELPH-MCNC: 4.6 G/DL
ALP BLD-CCNC: 48 U/L
ALT SERPL-CCNC: 18 U/L
ANION GAP SERPL CALC-SCNC: 9 MMOL/L
AST SERPL-CCNC: 18 U/L
BASOPHILS # BLD AUTO: 0.02 K/UL
BASOPHILS NFR BLD AUTO: 0.2 %
BILIRUB SERPL-MCNC: 0.6 MG/DL
BUN SERPL-MCNC: 17 MG/DL
CALCIUM SERPL-MCNC: 9.5 MG/DL
CHLORIDE SERPL-SCNC: 102 MMOL/L
CHOLEST SERPL-MCNC: 177 MG/DL
CO2 SERPL-SCNC: 29 MMOL/L
CREAT SERPL-MCNC: 0.8 MG/DL
EGFR: 93 ML/MIN/1.73M2
EOSINOPHIL # BLD AUTO: 0.07 K/UL
EOSINOPHIL NFR BLD AUTO: 0.7 %
GLUCOSE SERPL-MCNC: 74 MG/DL
HCT VFR BLD CALC: 46.7 %
HDLC SERPL-MCNC: 48 MG/DL
HGB BLD-MCNC: 15.4 G/DL
IMM GRANULOCYTES NFR BLD AUTO: 0.3 %
LDLC SERPL CALC-MCNC: 110 MG/DL
LYMPHOCYTES # BLD AUTO: 2.72 K/UL
LYMPHOCYTES NFR BLD AUTO: 28.6 %
MAN DIFF?: NORMAL
MCHC RBC-ENTMCNC: 30 PG
MCHC RBC-ENTMCNC: 33 GM/DL
MCV RBC AUTO: 90.9 FL
MONOCYTES # BLD AUTO: 0.73 K/UL
MONOCYTES NFR BLD AUTO: 7.7 %
NEUTROPHILS # BLD AUTO: 5.95 K/UL
NEUTROPHILS NFR BLD AUTO: 62.5 %
NONHDLC SERPL-MCNC: 129 MG/DL
PLATELET # BLD AUTO: 228 K/UL
POTASSIUM SERPL-SCNC: 4.4 MMOL/L
PROT SERPL-MCNC: 7.4 G/DL
RBC # BLD: 5.14 M/UL
RBC # FLD: 12.9 %
SODIUM SERPL-SCNC: 140 MMOL/L
TRIGL SERPL-MCNC: 97 MG/DL
WBC # FLD AUTO: 9.52 K/UL

## 2022-11-29 ENCOUNTER — APPOINTMENT (OUTPATIENT)
Dept: INTERNAL MEDICINE | Facility: CLINIC | Age: 72
End: 2022-11-29

## 2022-11-29 ENCOUNTER — NON-APPOINTMENT (OUTPATIENT)
Age: 72
End: 2022-11-29

## 2022-11-29 VITALS
HEART RATE: 76 BPM | WEIGHT: 225 LBS | SYSTOLIC BLOOD PRESSURE: 130 MMHG | DIASTOLIC BLOOD PRESSURE: 87 MMHG | HEIGHT: 72 IN | BODY MASS INDEX: 30.48 KG/M2

## 2022-11-29 DIAGNOSIS — L84 CORNS AND CALLOSITIES: ICD-10-CM

## 2022-11-29 DIAGNOSIS — R32 UNSPECIFIED URINARY INCONTINENCE: ICD-10-CM

## 2022-11-29 PROCEDURE — G0439: CPT

## 2022-11-29 PROCEDURE — 36415 COLL VENOUS BLD VENIPUNCTURE: CPT | Mod: 59

## 2022-11-29 RX ORDER — ASPIRIN 81 MG/1
81 TABLET ORAL
Qty: 100 | Refills: 1 | Status: DISCONTINUED | COMMUNITY
Start: 2018-01-27 | End: 2022-11-29

## 2022-11-29 NOTE — HISTORY OF PRESENT ILLNESS
[de-identified] : 72 year old male with history of obesity, hypertension, hyperlipidemia, aortic insufficiency,associated with asc. aortic aneurysm; s/p AVR (bovine) & repair of asc. aorta on 1/22/18 presents for initial annual exam.  Accompanied by wife\par \par States has not seen PCP in 5 years.  Follows with cardiology every 6 months.\par \par Complaining of recurrent foot callus.  Seen by podiatrist several years ago with no resolution.\par \par Having urinary incontinence, incomplete emptying.  Denies any dysuria, hematuria or back pain.\par \par Cannot recall if he got pneumonia vaccine in the past\par \par \par \par

## 2022-11-29 NOTE — HEALTH RISK ASSESSMENT
[Good] : ~his/her~  mood as  good [No falls in past year] : Patient reported no falls in the past year [0] : 2) Feeling down, depressed, or hopeless: Not at all (0) [PHQ-2 Negative - No further assessment needed] : PHQ-2 Negative - No further assessment needed [Patient reported colonoscopy was normal] : Patient reported colonoscopy was normal [Fully functional (bathing, dressing, toileting, transferring, walking, feeding)] : Fully functional (bathing, dressing, toileting, transferring, walking, feeding) [Fully functional (using the telephone, shopping, preparing meals, housekeeping, doing laundry, using] : Fully functional and needs no help or supervision to perform IADLs (using the telephone, shopping, preparing meals, housekeeping, doing laundry, using transportation, managing medications and managing finances) [[id number]] : 0 [ColonoscopyDate] : 2019

## 2022-11-29 NOTE — PHYSICAL EXAM
[Normal] : normal rate, regular rhythm, normal S1 and S2 and no murmur heard [Pedal Pulses Present] : the pedal pulses are present [No Edema] : there was no peripheral edema [No Extremity Clubbing/Cyanosis] : no extremity clubbing/cyanosis [Soft] : abdomen soft [Non Tender] : non-tender [Non-distended] : non-distended [No HSM] : no HSM [Normal Posterior Cervical Nodes] : no posterior cervical lymphadenopathy [Normal Anterior Cervical Nodes] : no anterior cervical lymphadenopathy [No CVA Tenderness] : no CVA  tenderness [No Joint Swelling] : no joint swelling [No Focal Deficits] : no focal deficits [de-identified] : Central adiposity [de-identified] : not performed as wearing mask due to covid precautions [de-identified] :  thick, hardened layers of skin

## 2022-11-29 NOTE — ASSESSMENT
[FreeTextEntry1] : \par \par Recurrent foot callus.  Seen by podiatrist several years ago with no resolution.\par -podiatry referral \par \par Urinary incontinence, incomplete emptying.  Denies any dysuria, hematuria or back pain.\par -check PSA \par -urology referral \par \par Hypertension, controlled \par -cont meds as directed \par \par Hyperlipidemia on statin \par -Will check labs \par \par Overweight\par -Being overweight increases your risk of health conditions such as heart disease, high blood pressure, type 2 diabetes, and certain types of cancer. It can also increase your risk for osteoarthritis, sleep apnea, and other respiratory problems. Aim for a slow, steady weight loss. Even a small amount of weight loss can lower your risk of health problems.\par \par Annual \par -Advise to get yearly Flu shot -up to date \par -Advise Yearly Skin cancer screening with Dermatologist \par -Advise Yearly Eye exam with Ophthalmologist\par -Advise Yearly Dental exam\par -Educated of the importance of Healthy diet, such as Mediterranean Diet and Exercise, such as walking >20 minutes a day and increasing gradually as tolerated\par \par Preventative screening \par -advised to get colonoscopy for colon cancer screening -up to date \par -will check PSA for prostate cancer screening -labs drawn \par \par -covid -recommended bivalent booster \par -[id number] -patient will check if he has received any in the past \par \par \par \par \par

## 2022-11-30 ENCOUNTER — RX RENEWAL (OUTPATIENT)
Age: 72
End: 2022-11-30

## 2022-12-06 ENCOUNTER — CLINICAL ADVICE (OUTPATIENT)
Age: 72
End: 2022-12-06

## 2022-12-06 LAB
ALBUMIN SERPL ELPH-MCNC: 4.4 G/DL
ALP BLD-CCNC: 48 U/L
ALT SERPL-CCNC: 36 U/L
ANION GAP SERPL CALC-SCNC: 11 MMOL/L
APPEARANCE: CLEAR
AST SERPL-CCNC: 32 U/L
BACTERIA: NEGATIVE
BASOPHILS # BLD AUTO: 0.03 K/UL
BASOPHILS NFR BLD AUTO: 0.3 %
BILIRUB DIRECT SERPL-MCNC: 0.2 MG/DL
BILIRUB INDIRECT SERPL-MCNC: 0.3 MG/DL
BILIRUB SERPL-MCNC: 0.5 MG/DL
BILIRUBIN URINE: NEGATIVE
BLOOD URINE: NEGATIVE
BUN SERPL-MCNC: 17 MG/DL
CALCIUM SERPL-MCNC: 9.6 MG/DL
CHLORIDE SERPL-SCNC: 100 MMOL/L
CHOLEST SERPL-MCNC: 101 MG/DL
CO2 SERPL-SCNC: 30 MMOL/L
COLOR: YELLOW
CREAT SERPL-MCNC: 0.95 MG/DL
EGFR: 85 ML/MIN/1.73M2
EOSINOPHIL # BLD AUTO: 0.1 K/UL
EOSINOPHIL NFR BLD AUTO: 0.9 %
ESTIMATED AVERAGE GLUCOSE: 123 MG/DL
FERRITIN SERPL-MCNC: 398 NG/ML
FOLATE SERPL-MCNC: 9.4 NG/ML
GLUCOSE QUALITATIVE U: NEGATIVE
GLUCOSE SERPL-MCNC: 89 MG/DL
HBA1C MFR BLD HPLC: 5.9 %
HCT VFR BLD CALC: 45.6 %
HDLC SERPL-MCNC: 34 MG/DL
HGB BLD-MCNC: 15.1 G/DL
HYALINE CASTS: 2 /LPF
IMM GRANULOCYTES NFR BLD AUTO: 0.3 %
IRON SATN MFR SERPL: 13 %
IRON SERPL-MCNC: 40 UG/DL
KETONES URINE: NEGATIVE
LDLC SERPL CALC-MCNC: 49 MG/DL
LEUKOCYTE ESTERASE URINE: NEGATIVE
LYMPHOCYTES # BLD AUTO: 2.29 K/UL
LYMPHOCYTES NFR BLD AUTO: 21 %
MAN DIFF?: NORMAL
MCHC RBC-ENTMCNC: 29.7 PG
MCHC RBC-ENTMCNC: 33.1 GM/DL
MCV RBC AUTO: 89.6 FL
MICROSCOPIC-UA: NORMAL
MONOCYTES # BLD AUTO: 0.84 K/UL
MONOCYTES NFR BLD AUTO: 7.7 %
NEUTROPHILS # BLD AUTO: 7.59 K/UL
NEUTROPHILS NFR BLD AUTO: 69.8 %
NITRITE URINE: NEGATIVE
NONHDLC SERPL-MCNC: 67 MG/DL
PH URINE: 6.5
PLATELET # BLD AUTO: 262 K/UL
POTASSIUM SERPL-SCNC: 3.9 MMOL/L
PROT SERPL-MCNC: 6.9 G/DL
PROTEIN URINE: ABNORMAL
PSA SERPL-MCNC: 2.82 NG/ML
RBC # BLD: 5.09 M/UL
RBC # FLD: 12.4 %
RED BLOOD CELLS URINE: 6 /HPF
SODIUM SERPL-SCNC: 141 MMOL/L
SPECIFIC GRAVITY URINE: 1.03
SQUAMOUS EPITHELIAL CELLS: 1 /HPF
TIBC SERPL-MCNC: 301 UG/DL
TRIGL SERPL-MCNC: 88 MG/DL
TSH SERPL-ACNC: 1.23 UIU/ML
UIBC SERPL-MCNC: 261 UG/DL
UROBILINOGEN URINE: ABNORMAL
VIT B12 SERPL-MCNC: 530 PG/ML
WBC # FLD AUTO: 10.88 K/UL
WHITE BLOOD CELLS URINE: 2 /HPF

## 2023-02-03 ENCOUNTER — APPOINTMENT (OUTPATIENT)
Dept: UROLOGY | Facility: CLINIC | Age: 73
End: 2023-02-03
Payer: COMMERCIAL

## 2023-02-03 DIAGNOSIS — Z12.5 ENCOUNTER FOR SCREENING FOR MALIGNANT NEOPLASM OF PROSTATE: ICD-10-CM

## 2023-02-03 DIAGNOSIS — N39.41 URGE INCONTINENCE: ICD-10-CM

## 2023-02-03 PROCEDURE — 99203 OFFICE O/P NEW LOW 30 MIN: CPT

## 2023-02-04 PROBLEM — Z12.5 PROSTATE CANCER SCREENING: Status: ACTIVE | Noted: 2023-02-04

## 2023-02-04 LAB
APPEARANCE: CLEAR
BACTERIA: NEGATIVE
BILIRUBIN URINE: NEGATIVE
BLOOD URINE: NEGATIVE
COLOR: YELLOW
GLUCOSE QUALITATIVE U: NEGATIVE
HYALINE CASTS: 0 /LPF
KETONES URINE: NEGATIVE
LEUKOCYTE ESTERASE URINE: NEGATIVE
MICROSCOPIC-UA: NORMAL
NITRITE URINE: NEGATIVE
PH URINE: 5.5
PROTEIN URINE: NORMAL
RED BLOOD CELLS URINE: 1 /HPF
SPECIFIC GRAVITY URINE: 1.03
SQUAMOUS EPITHELIAL CELLS: 0 /HPF
UROBILINOGEN URINE: NORMAL
WHITE BLOOD CELLS URINE: 1 /HPF

## 2023-02-04 NOTE — HISTORY OF PRESENT ILLNESS
[FreeTextEntry1] : Mesfin Vargas presents to the office today.  He is a 73-year-old man here today with his wife.  He reports his primary symptoms that come to the visit today include urinary frequency and urgency and occasional urge associated urinary incontinence.  The incontinence part of the picture is extremely minimal and he says it is a rare event.  The primary reason for his visit is not so much from bother from the symptoms but rather to make sure that the symptoms or not a sign of something more significantly long.  He is interested in having prostate cancer screening for this reason and also would like his urine tested for evaluation.\par \par The patient has no prior history of urinary tract infections or retention.  No history of kidney stones.  No recent catheterizations of the bladder.  He has no suprapubic or abdominal pain and no flank pain.  His appetite and weight have been normal and stable.\par

## 2023-02-04 NOTE — ASSESSMENT
[FreeTextEntry1] : A urinalysis was collected today.  It shows normal findings with no evidence of any microscopic hematuria or other abnormality that would suggest a potential infection.  I also reviewed a PSA level that has been done in November 2022.  This was 2.82 ng/mL which is quite normal for his age and I would not suggest that he need to undergo any further work-up for prostate screening such as prostate imaging or prostate biopsy.\par \par At this point, I think he has fairly typical symptoms suggestive of BPH and bladder outlet obstruction.  I offered him the options of using medical treatment such as an alpha-blocker to mitigate the symptoms but he prefers not to take on any medications at this point.  The symptoms are only minimal bother to him.  I think moving forward he can continue to have annual prostate cancer screening but there is no other need for further urologic work-up at this point.  He will let me know if his urinary symptoms worsen to the point that he wishes to seek out treatment.

## 2023-02-04 NOTE — PHYSICAL EXAM
[General Appearance - Well Developed] : well developed [General Appearance - Well Nourished] : well nourished [Normal Appearance] : normal appearance [Well Groomed] : well groomed [General Appearance - In No Acute Distress] : no acute distress [Edema] : no peripheral edema [Respiration, Rhythm And Depth] : normal respiratory rhythm and effort [Exaggerated Use Of Accessory Muscles For Inspiration] : no accessory muscle use [Abdomen Soft] : soft [Costovertebral Angle Tenderness] : no ~M costovertebral angle tenderness [Abdomen Tenderness] : non-tender [Urethral Meatus] : meatus normal [Urinary Bladder Findings] : the bladder was normal on palpation [Scrotum] : the scrotum was normal [Testes Mass (___cm)] : there were no testicular masses [Normal Station and Gait] : the gait and station were normal for the patient's age [] : no rash [No Focal Deficits] : no focal deficits [Oriented To Time, Place, And Person] : oriented to person, place, and time [Affect] : the affect was normal [Mood] : the mood was normal [Not Anxious] : not anxious [No Palpable Adenopathy] : no palpable adenopathy

## 2023-02-06 LAB — BACTERIA UR CULT: NORMAL

## 2023-02-27 ENCOUNTER — APPOINTMENT (OUTPATIENT)
Dept: INTERNAL MEDICINE | Facility: CLINIC | Age: 73
End: 2023-02-27
Payer: COMMERCIAL

## 2023-02-27 VITALS
WEIGHT: 229 LBS | BODY MASS INDEX: 31.02 KG/M2 | HEART RATE: 61 BPM | SYSTOLIC BLOOD PRESSURE: 187 MMHG | HEIGHT: 72 IN | DIASTOLIC BLOOD PRESSURE: 89 MMHG

## 2023-02-27 DIAGNOSIS — M54.16 RADICULOPATHY, LUMBAR REGION: ICD-10-CM

## 2023-02-27 PROCEDURE — 99214 OFFICE O/P EST MOD 30 MIN: CPT

## 2023-02-27 NOTE — PHYSICAL EXAM
[Normal] : no acute distress, well nourished, well developed and well-appearing [No Focal Deficits] : no focal deficits [de-identified] : paraspinal tenderness over right flank

## 2023-02-27 NOTE — HISTORY OF PRESENT ILLNESS
[FreeTextEntry8] : 72 year old male with history of obesity, hypertension, hyperlipidemia, aortic insufficiency,associated with asc. aortic aneurysm; s/p AVR (bovine) & repair of asc. aorta on 1/22/18 presents complaining of lower back pain starting yesterday.  Associated with radiation down his right leg.  Had issues in the past resolved on its own.  Has been taking ibuprofen with varying degrees of success.  Denies any other acute complaints.  \par \par Accompanied by wife

## 2023-03-01 ENCOUNTER — APPOINTMENT (OUTPATIENT)
Dept: ORTHOPEDIC SURGERY | Facility: CLINIC | Age: 73
End: 2023-03-01

## 2023-03-09 ENCOUNTER — APPOINTMENT (OUTPATIENT)
Dept: INTERNAL MEDICINE | Facility: CLINIC | Age: 73
End: 2023-03-09

## 2023-03-09 ENCOUNTER — APPOINTMENT (OUTPATIENT)
Dept: INTERNAL MEDICINE | Facility: CLINIC | Age: 73
End: 2023-03-09
Payer: COMMERCIAL

## 2023-03-09 ENCOUNTER — APPOINTMENT (OUTPATIENT)
Dept: UROLOGY | Facility: CLINIC | Age: 73
End: 2023-03-09
Payer: COMMERCIAL

## 2023-03-09 VITALS
BODY MASS INDEX: 30.07 KG/M2 | DIASTOLIC BLOOD PRESSURE: 95 MMHG | HEART RATE: 91 BPM | SYSTOLIC BLOOD PRESSURE: 152 MMHG | HEIGHT: 72 IN | OXYGEN SATURATION: 97 % | WEIGHT: 222 LBS

## 2023-03-09 DIAGNOSIS — R39.15 URGENCY OF URINATION: ICD-10-CM

## 2023-03-09 LAB
BILIRUB UR QL STRIP: NEGATIVE
CLARITY UR: CLEAR
COLLECTION METHOD: NORMAL
GLUCOSE UR-MCNC: NEGATIVE
HCG UR QL: 0.2 EU/DL
HGB UR QL STRIP.AUTO: NORMAL
KETONES UR-MCNC: NEGATIVE
LEUKOCYTE ESTERASE UR QL STRIP: NORMAL
NITRITE UR QL STRIP: NEGATIVE
PH UR STRIP: 5.5
PROT UR STRIP-MCNC: NORMAL
SP GR UR STRIP: 1.01

## 2023-03-09 PROCEDURE — 99214 OFFICE O/P EST MOD 30 MIN: CPT

## 2023-03-09 NOTE — HISTORY OF PRESENT ILLNESS
[FreeTextEntry8] : 73 year old male with c/o urinary issues for about 3-4 days. \par He did have Pain in his back two weeks but that was more of a radiculopathy. \par Now having trouble urinating. \par Has an urgency and frequency but only going drops. \par \par

## 2023-03-09 NOTE — ASSESSMENT
[FreeTextEntry1] : UTI most likely but given the fact hes having trouble urinating and he has a urologist pt would benefit from seeing urology \par

## 2023-03-10 LAB
APPEARANCE: ABNORMAL
BACTERIA: ABNORMAL
BILIRUBIN URINE: NEGATIVE
BLOOD URINE: ABNORMAL
COLOR: NORMAL
GLUCOSE QUALITATIVE U: NEGATIVE
HYALINE CASTS: 1 /LPF
KETONES URINE: NEGATIVE
LEUKOCYTE ESTERASE URINE: ABNORMAL
MICROSCOPIC-UA: NORMAL
NITRITE URINE: NEGATIVE
PH URINE: 6
PROTEIN URINE: ABNORMAL
RED BLOOD CELLS URINE: 3 /HPF
SPECIFIC GRAVITY URINE: 1.01
SQUAMOUS EPITHELIAL CELLS: 0 /HPF
UROBILINOGEN URINE: NORMAL
WHITE BLOOD CELLS URINE: 89 /HPF

## 2023-03-11 NOTE — HISTORY OF PRESENT ILLNESS
[FreeTextEntry1] : Mesfin Vargas returns to the office today.  He is a 73-year-old man who I met about a month ago with lower urinary tract symptoms including urgency and frequency and occasional urge associated urinary incontinence.  I offered him an alpha-blocker as I suspect that his symptoms to be related to underlying BPH.  He declined medication at that time preferring to remain off and tolerate his symptoms.  He is now back today with similar symptoms but with the addition also of some dysuria.  He has also been experiencing recent back pain that radiates down his legs.  He was recently started on steroids as it was thought that his lower extremity symptoms were likely related to either an orthopedic or spine related issue.  He does feel as though those symptoms in the lower extremities are improving on the steroid medication.\par

## 2023-03-11 NOTE — ASSESSMENT
[FreeTextEntry1] : The patient had a urine sample collected earlier today at his primary care physician's office.  This will be followed for results.  The appearance of the urine sample that he also provided today to me was turbid and I do strongly suspect he has a urinary tract infection.  I prescribed him ciprofloxacin empirically to manage what is likely a UTI.  If there is any need to change the antibiotic choice based on the culture sensitivity profile, I will contact him to make a change.\par \par Given that urinary tract infections are often associated with BPH and bladder outlet obstruction causing incomplete emptying of the bladder, I would again suggest to him that he may benefit from an alpha-blocker.  He is now in agreement to do so and I gave him a prescription for tamsulosin 0.4 mg.  Hopefully this will help mitigate his urinary symptoms at baseline and also minimize the risk of additional infection.

## 2023-03-13 LAB — BACTERIA UR CULT: ABNORMAL

## 2023-04-03 ENCOUNTER — APPOINTMENT (OUTPATIENT)
Dept: INTERNAL MEDICINE | Facility: CLINIC | Age: 73
End: 2023-04-03

## 2023-04-03 ENCOUNTER — APPOINTMENT (OUTPATIENT)
Dept: UROLOGY | Facility: CLINIC | Age: 73
End: 2023-04-03
Payer: COMMERCIAL

## 2023-04-03 VITALS
WEIGHT: 231 LBS | SYSTOLIC BLOOD PRESSURE: 134 MMHG | HEART RATE: 74 BPM | DIASTOLIC BLOOD PRESSURE: 77 MMHG | BODY MASS INDEX: 31.29 KG/M2 | HEIGHT: 72 IN | RESPIRATION RATE: 16 BRPM

## 2023-04-03 DIAGNOSIS — R39.9 UNSPECIFIED SYMPTOMS AND SIGNS INVOLVING THE GENITOURINARY SYSTEM: ICD-10-CM

## 2023-04-03 PROCEDURE — 99214 OFFICE O/P EST MOD 30 MIN: CPT

## 2023-04-03 RX ORDER — CIPROFLOXACIN HYDROCHLORIDE 500 MG/1
500 TABLET, FILM COATED ORAL
Qty: 14 | Refills: 0 | Status: COMPLETED | COMMUNITY
Start: 2023-03-09 | End: 2023-04-03

## 2023-04-04 PROBLEM — R39.9 UTI SYMPTOMS: Status: ACTIVE | Noted: 2023-03-09

## 2023-04-04 LAB
APPEARANCE: CLEAR
BACTERIA: NEGATIVE
BILIRUBIN URINE: NEGATIVE
BLOOD URINE: NEGATIVE
COLOR: COLORLESS
GLUCOSE QUALITATIVE U: NEGATIVE
HYALINE CASTS: 0 /LPF
KETONES URINE: NEGATIVE
LEUKOCYTE ESTERASE URINE: NEGATIVE
MICROSCOPIC-UA: NORMAL
NITRITE URINE: NEGATIVE
PH URINE: 6
PROTEIN URINE: NEGATIVE
RED BLOOD CELLS URINE: 0 /HPF
SPECIFIC GRAVITY URINE: 1.01
SQUAMOUS EPITHELIAL CELLS: 0 /HPF
UROBILINOGEN URINE: NORMAL
WHITE BLOOD CELLS URINE: 0 /HPF

## 2023-04-04 NOTE — PHYSICAL EXAM
[Normal Appearance] : normal appearance [Well Groomed] : well groomed [General Appearance - In No Acute Distress] : no acute distress [Abdomen Soft] : soft [Abdomen Tenderness] : non-tender [Costovertebral Angle Tenderness] : no ~M costovertebral angle tenderness [Urethral Meatus] : meatus normal [Urinary Bladder Findings] : the bladder was normal on palpation [Scrotum] : the scrotum was normal [Testes Mass (___cm)] : there were no testicular masses [No Prostate Nodules] : no prostate nodules [Edema] : no peripheral edema [] : no respiratory distress [Respiration, Rhythm And Depth] : normal respiratory rhythm and effort [Exaggerated Use Of Accessory Muscles For Inspiration] : no accessory muscle use [Oriented To Time, Place, And Person] : oriented to person, place, and time [Affect] : the affect was normal [Mood] : the mood was normal [Not Anxious] : not anxious [Normal Station and Gait] : the gait and station were normal for the patient's age [No Focal Deficits] : no focal deficits [FreeTextEntry1] : legally blind

## 2023-04-04 NOTE — HISTORY OF PRESENT ILLNESS
[FreeTextEntry1] : Mesfin Vargas returns to the office today.  He is 73 years old and I met him earlier this year for management of lower urinary tract symptoms. I initially offered him an alpha blocker which he declined. He had a subsequent visit in early March, he had told me that he had a urine culture done by his primary care physician with a suspected urinary tract infection.  He was treated with Cipro and I subsequently did again offer him an alpha-blocker which he agreed to.  He is now back today for follow-up.\par \par Patient is doing much better overall in terms of his urinary patterns.  He is no longer experiencing any burning or discomfort with urination.  His frequency has decreased and his flow seems improved.  He denies hematuria or dysuria.  He denies any suprapubic pain or discomfort and no flank pain.  His appetite and bowel movements are normal

## 2023-04-04 NOTE — ASSESSMENT
[FreeTextEntry1] : A repeat urine sample will be taken today for culture to determine if he is now free of infection.  He is no longer having symptoms to suggest infection but this will be done to confirm.  In addition, I have recommended that he continue on using tamsulosin as a long-term maintenance medication.  I reviewed with him the reasoning for this would be to help reduce the risk of additional episodes of urinary tract infections which for most men occur because of incomplete emptying of the bladder related to BPH and bladder outlet obstruction.  We discussed how the medication would work to do so and we also reviewed the potential side effect profiles to be considered.  He is in agreement to do this and will continue on with tamsulosin.  I gave him a renewal of the prescription to confirm that he does have a new and active prescription present as he was not sure about it from his pharmacy.\par \par I will plan to see him back in about 6 months to reassess.  Depending on how he is doing and his symptoms at that time, we will review whether or not we need to consider adding any additional medication or increasing the dose of the tamsulosin.\par \par

## 2023-04-10 LAB — BACTERIA UR CULT: NORMAL

## 2023-05-22 ENCOUNTER — NON-APPOINTMENT (OUTPATIENT)
Age: 73
End: 2023-05-22

## 2023-05-22 ENCOUNTER — APPOINTMENT (OUTPATIENT)
Dept: CARDIOLOGY | Facility: CLINIC | Age: 73
End: 2023-05-22
Payer: COMMERCIAL

## 2023-05-22 VITALS
OXYGEN SATURATION: 96 % | WEIGHT: 228 LBS | DIASTOLIC BLOOD PRESSURE: 64 MMHG | SYSTOLIC BLOOD PRESSURE: 124 MMHG | HEART RATE: 83 BPM | HEIGHT: 72 IN | BODY MASS INDEX: 30.88 KG/M2

## 2023-05-22 PROCEDURE — 93000 ELECTROCARDIOGRAM COMPLETE: CPT

## 2023-05-22 PROCEDURE — 99214 OFFICE O/P EST MOD 30 MIN: CPT

## 2023-05-22 NOTE — HISTORY OF PRESENT ILLNESS
[FreeTextEntry1] : Since I saw him last, he continues his usual activities & remains well.  There have been no episodes of exertional chest discomfort or LESTER. He reports no palpitations, and no episodes of orthopnea or PND.  \par \par He was recently started on Flomax by his urologist for urinary sxs. associated with BPH.  There have been no other interval medical problems.

## 2023-05-22 NOTE — ASSESSMENT
[FreeTextEntry1] : Ascending aortic aneurysm with AI, s/p AVR (bovine) & repair of Asc aorta on 1/22/18 by Dr. Alvarado.\par \par Mild LV enlargement at end diastole. Mild global systolic dysfunction echo with preserved EF. \par \par HTN\par \par Hyperlipidemia, on simvastatin.\par \par Non-obstructive CAD with 30% RCA lesion at cardiac cath in April 2016 by Dr. DAMI Wilder

## 2023-05-22 NOTE — PHYSICAL EXAM
[General Appearance - Well Developed] : well developed [Normal Appearance] : normal appearance [General Appearance - Well Nourished] : well nourished [Well Groomed] : well groomed [General Appearance - In No Acute Distress] : no acute distress [Normal Conjunctiva] : the conjunctiva exhibited no abnormalities [Eyelids - No Xanthelasma] : the eyelids demonstrated no xanthelasmas [Normal Jugular Venous A Waves Present] : normal jugular venous A waves present [Normal Jugular Venous V Waves Present] : normal jugular venous V waves present [Respiration, Rhythm And Depth] : normal respiratory rhythm and effort [Auscultation Breath Sounds / Voice Sounds] : lungs were clear to auscultation bilaterally [Heart Rate And Rhythm] : heart rate and rhythm were normal [Bowel Sounds] : normal bowel sounds [Abnormal Walk] : normal gait [Nail Clubbing] : no clubbing of the fingernails [Cyanosis, Localized] : no localized cyanosis [Skin Color & Pigmentation] : normal skin color and pigmentation [] : no rash [Oriented To Time, Place, And Person] : oriented to person, place, and time [Affect] : the affect was normal [Impaired Insight] : insight and judgment were intact [Mood] : the mood was normal [FreeTextEntry1] : 2+ pulses in the upper and lower extremities. No edema.

## 2023-05-22 NOTE — DISCUSSION/SUMMARY
[FreeTextEntry1] : HTN - in good range; continue enalapril and metoprolol.\par \par HLD - continue simvastatin at current dose. \par \par Mild non-obstructive CAD - continue metoprolol, enalapril & ASA 81 mg daily.\par \par TTE at next O.V. to re-assess AVR and asc. aorta as well as LV function.\par \par 31 minutes spent on today's office visit. \par \par He will return in 6 months.  [EKG obtained to assist in diagnosis and management of assessed problem(s)] : EKG obtained to assist in diagnosis and management of assessed problem(s)

## 2023-07-13 ENCOUNTER — RX RENEWAL (OUTPATIENT)
Age: 73
End: 2023-07-13

## 2023-07-14 ENCOUNTER — NON-APPOINTMENT (OUTPATIENT)
Age: 73
End: 2023-07-14

## 2023-07-14 ENCOUNTER — APPOINTMENT (OUTPATIENT)
Dept: NEUROSURGERY | Facility: CLINIC | Age: 73
End: 2023-07-14
Payer: COMMERCIAL

## 2023-07-14 VITALS
BODY MASS INDEX: 30.88 KG/M2 | TEMPERATURE: 98.3 F | OXYGEN SATURATION: 95 % | DIASTOLIC BLOOD PRESSURE: 64 MMHG | WEIGHT: 228 LBS | SYSTOLIC BLOOD PRESSURE: 105 MMHG | HEART RATE: 72 BPM | HEIGHT: 72 IN

## 2023-07-14 DIAGNOSIS — Z87.891 PERSONAL HISTORY OF NICOTINE DEPENDENCE: ICD-10-CM

## 2023-07-14 PROCEDURE — 99205 OFFICE O/P NEW HI 60 MIN: CPT

## 2023-07-14 RX ORDER — CYCLOBENZAPRINE HYDROCHLORIDE 5 MG/1
5 TABLET, FILM COATED ORAL
Qty: 21 | Refills: 0 | Status: DISCONTINUED | COMMUNITY
Start: 2023-02-27 | End: 2023-07-14

## 2023-07-14 RX ORDER — CLOTRIMAZOLE AND BETAMETHASONE DIPROPIONATE 10; .5 MG/G; MG/G
1-0.05 CREAM TOPICAL TWICE DAILY
Qty: 1 | Refills: 0 | Status: DISCONTINUED | COMMUNITY
Start: 2022-11-29 | End: 2023-07-14

## 2023-07-17 NOTE — PHYSICAL EXAM
[General Appearance - Alert] : alert [General Appearance - Well Nourished] : well nourished [General Appearance - Well-Appearing] : healthy appearing [Oriented To Time, Place, And Person] : oriented to person, place, and time [Person] : oriented to person [Place] : oriented to place [Time] : oriented to time [Cranial Nerves Optic (II)] : visual acuity intact bilaterally,  pupils equal round and reactive to light [Cranial Nerves Oculomotor (III)] : extraocular motion intact [Cranial Nerves Trigeminal (V)] : facial sensation intact symmetrically [Cranial Nerves Facial (VII)] : face symmetrical [Cranial Nerves Vestibulocochlear (VIII)] : hearing was intact bilaterally [Cranial Nerves Glossopharyngeal (IX)] : tongue and palate midline [Cranial Nerves Accessory (XI - Cranial And Spinal)] : head turning and shoulder shrug symmetric [Cranial Nerves Hypoglossal (XII)] : there was no tongue deviation with protrusion [Motor Handedness Right-Handed] : the patient is right hand dominant [Sclera] : the sclera and conjunctiva were normal [Outer Ear] : the ears and nose were normal in appearance [Neck Appearance] : the appearance of the neck was normal [] : no respiratory distress [Respiration, Rhythm And Depth] : normal respiratory rhythm and effort [Exaggerated Use Of Accessory Muscles For Inspiration] : no accessory muscle use [Heart Rate And Rhythm] : heart rate was normal and rhythm regular [Abnormal Walk] : normal gait [Skin Color & Pigmentation] : normal skin color and pigmentation [FreeTextEntry5] : No drift both upper extremities

## 2023-07-17 NOTE — HISTORY OF PRESENT ILLNESS
[de-identified] : zahraa Toure is a pleasant right handed 73 year old gentleman who presents for follow up after he fell on 6/13 and lost consciousness.  Pt was taken to [facility], [place] for evaluation.\par \par Today he reports that he is feeling well without any specific complaints. He is A & O x 4.  He denies weakness in his extremities, seizure activity,

## 2023-07-17 NOTE — ASSESSMENT
[FreeTextEntry1] : IMPRESSION:\par Pt is a pleasant 73 year old gentleman who is s/p fall with LOC on 6/13/23.  Pt is neurologically intact.  Presents for follow up with us.\par \par \par PLAN:\par 1. No recommendation for neuro imaging as pt is neurologically intact without any obvious neuro defitits.\par 2. F/U with PCP\par 3. F/U with NSG PRN.

## 2023-07-17 NOTE — REASON FOR VISIT
[New Patient Visit] : a new patient visit [Referred By: _________] : Patient was referred by RIVKA [Spouse] : spouse

## 2023-10-04 PROBLEM — N40.1 BPH WITH OBSTRUCTION/LOWER URINARY TRACT SYMPTOMS: Status: ACTIVE | Noted: 2023-03-09

## 2023-10-05 ENCOUNTER — APPOINTMENT (OUTPATIENT)
Dept: UROLOGY | Facility: CLINIC | Age: 73
End: 2023-10-05
Payer: COMMERCIAL

## 2023-10-05 VITALS
WEIGHT: 230 LBS | BODY MASS INDEX: 31.15 KG/M2 | HEART RATE: 53 BPM | HEIGHT: 72 IN | SYSTOLIC BLOOD PRESSURE: 128 MMHG | RESPIRATION RATE: 17 BRPM | DIASTOLIC BLOOD PRESSURE: 72 MMHG

## 2023-10-05 DIAGNOSIS — R31.0 GROSS HEMATURIA: ICD-10-CM

## 2023-10-05 DIAGNOSIS — N13.8 BENIGN PROSTATIC HYPERPLASIA WITH LOWER URINARY TRACT SYMPMS: ICD-10-CM

## 2023-10-05 DIAGNOSIS — N40.1 BENIGN PROSTATIC HYPERPLASIA WITH LOWER URINARY TRACT SYMPMS: ICD-10-CM

## 2023-10-05 PROCEDURE — 99214 OFFICE O/P EST MOD 30 MIN: CPT

## 2023-10-06 ENCOUNTER — INPATIENT (INPATIENT)
Facility: HOSPITAL | Age: 73
LOS: 3 days | Discharge: HOME | DRG: 281 | End: 2023-10-10
Attending: STUDENT IN AN ORGANIZED HEALTH CARE EDUCATION/TRAINING PROGRAM | Admitting: STUDENT IN AN ORGANIZED HEALTH CARE EDUCATION/TRAINING PROGRAM
Payer: COMMERCIAL

## 2023-10-06 VITALS — HEIGHT: 67 IN | WEIGHT: 179.9 LBS

## 2023-10-06 PROBLEM — R31.0 INTERMITTENT GROSS HEMATURIA: Status: RESOLVED | Noted: 2023-10-05 | Resolved: 2023-10-06

## 2023-10-06 LAB
ALBUMIN SERPL ELPH-MCNC: 4.5 G/DL — SIGNIFICANT CHANGE UP (ref 3.3–5)
ALP SERPL-CCNC: 52 U/L — SIGNIFICANT CHANGE UP (ref 40–120)
ALT FLD-CCNC: 21 U/L — SIGNIFICANT CHANGE UP (ref 10–45)
ANION GAP SERPL CALC-SCNC: 15 MMOL/L — SIGNIFICANT CHANGE UP (ref 5–17)
APPEARANCE: CLEAR
AST SERPL-CCNC: 19 U/L — SIGNIFICANT CHANGE UP (ref 10–40)
BACTERIA: NEGATIVE /HPF
BASE EXCESS BLDV CALC-SCNC: -1.1 MMOL/L — SIGNIFICANT CHANGE UP (ref -2–3)
BASOPHILS # BLD AUTO: 0.02 K/UL — SIGNIFICANT CHANGE UP (ref 0–0.2)
BASOPHILS NFR BLD AUTO: 0.2 % — SIGNIFICANT CHANGE UP (ref 0–2)
BILIRUB SERPL-MCNC: 0.4 MG/DL — SIGNIFICANT CHANGE UP (ref 0.2–1.2)
BILIRUBIN URINE: NEGATIVE
BLD GP AB SCN SERPL QL: NEGATIVE — SIGNIFICANT CHANGE UP
BLOOD URINE: NEGATIVE
BUN SERPL-MCNC: 20 MG/DL — SIGNIFICANT CHANGE UP (ref 7–23)
CA-I SERPL-SCNC: 1.19 MMOL/L — SIGNIFICANT CHANGE UP (ref 1.15–1.33)
CALCIUM SERPL-MCNC: 9.1 MG/DL — SIGNIFICANT CHANGE UP (ref 8.4–10.5)
CAST: 0 /LPF
CHLORIDE BLDV-SCNC: 103 MMOL/L — SIGNIFICANT CHANGE UP (ref 96–108)
CHLORIDE SERPL-SCNC: 104 MMOL/L — SIGNIFICANT CHANGE UP (ref 96–108)
CO2 BLDV-SCNC: 26 MMOL/L — SIGNIFICANT CHANGE UP (ref 22–26)
CO2 SERPL-SCNC: 20 MMOL/L — LOW (ref 22–31)
COLOR: YELLOW
CREAT SERPL-MCNC: 1.07 MG/DL — SIGNIFICANT CHANGE UP (ref 0.5–1.3)
EGFR: 73 ML/MIN/1.73M2 — SIGNIFICANT CHANGE UP
EOSINOPHIL # BLD AUTO: 0.16 K/UL — SIGNIFICANT CHANGE UP (ref 0–0.5)
EOSINOPHIL NFR BLD AUTO: 1.9 % — SIGNIFICANT CHANGE UP (ref 0–6)
EPITHELIAL CELLS: 0 /HPF
ETHANOL SERPL-MCNC: <10 MG/DL — SIGNIFICANT CHANGE UP (ref 0–10)
GAS PNL BLDV: 136 MMOL/L — SIGNIFICANT CHANGE UP (ref 136–145)
GAS PNL BLDV: SIGNIFICANT CHANGE UP
GLUCOSE BLDV-MCNC: 169 MG/DL — HIGH (ref 70–99)
GLUCOSE QUALITATIVE U: NEGATIVE MG/DL
GLUCOSE SERPL-MCNC: 162 MG/DL — HIGH (ref 70–99)
HCO3 BLDV-SCNC: 24 MMOL/L — SIGNIFICANT CHANGE UP (ref 22–29)
HCT VFR BLD CALC: 45.2 % — SIGNIFICANT CHANGE UP (ref 39–50)
HCT VFR BLDA CALC: 45 % — SIGNIFICANT CHANGE UP (ref 39–51)
HGB BLD CALC-MCNC: 15 G/DL — SIGNIFICANT CHANGE UP (ref 12.6–17.4)
HGB BLD-MCNC: 14.9 G/DL — SIGNIFICANT CHANGE UP (ref 13–17)
IMM GRANULOCYTES NFR BLD AUTO: 0.4 % — SIGNIFICANT CHANGE UP (ref 0–0.9)
KETONES URINE: NEGATIVE MG/DL
LACTATE BLDV-MCNC: 4.3 MMOL/L — CRITICAL HIGH (ref 0.5–2)
LEUKOCYTE ESTERASE URINE: NEGATIVE
LYMPHOCYTES # BLD AUTO: 3.03 K/UL — SIGNIFICANT CHANGE UP (ref 1–3.3)
LYMPHOCYTES # BLD AUTO: 36.2 % — SIGNIFICANT CHANGE UP (ref 13–44)
MAGNESIUM SERPL-MCNC: 2.1 MG/DL — SIGNIFICANT CHANGE UP (ref 1.6–2.6)
MCHC RBC-ENTMCNC: 29.2 PG — SIGNIFICANT CHANGE UP (ref 27–34)
MCHC RBC-ENTMCNC: 33 GM/DL — SIGNIFICANT CHANGE UP (ref 32–36)
MCV RBC AUTO: 88.6 FL — SIGNIFICANT CHANGE UP (ref 80–100)
MICROSCOPIC-UA: NORMAL
MONOCYTES # BLD AUTO: 0.56 K/UL — SIGNIFICANT CHANGE UP (ref 0–0.9)
MONOCYTES NFR BLD AUTO: 6.7 % — SIGNIFICANT CHANGE UP (ref 2–14)
NEUTROPHILS # BLD AUTO: 4.58 K/UL — SIGNIFICANT CHANGE UP (ref 1.8–7.4)
NEUTROPHILS NFR BLD AUTO: 54.6 % — SIGNIFICANT CHANGE UP (ref 43–77)
NITRITE URINE: NEGATIVE
NRBC # BLD: 0 /100 WBCS — SIGNIFICANT CHANGE UP (ref 0–0)
NT-PROBNP SERPL-SCNC: 76 PG/ML — SIGNIFICANT CHANGE UP (ref 0–300)
PCO2 BLDV: 42 MMHG — SIGNIFICANT CHANGE UP (ref 42–55)
PH BLDV: 7.37 — SIGNIFICANT CHANGE UP (ref 7.32–7.43)
PH URINE: 7.5
PLATELET # BLD AUTO: 186 K/UL — SIGNIFICANT CHANGE UP (ref 150–400)
PO2 BLDV: 52 MMHG — HIGH (ref 25–45)
POTASSIUM BLDV-SCNC: 3.5 MMOL/L — SIGNIFICANT CHANGE UP (ref 3.5–5.1)
POTASSIUM SERPL-MCNC: 3.5 MMOL/L — SIGNIFICANT CHANGE UP (ref 3.5–5.3)
POTASSIUM SERPL-SCNC: 3.5 MMOL/L — SIGNIFICANT CHANGE UP (ref 3.5–5.3)
PROCALCITONIN SERPL-MCNC: 0.04 NG/ML — SIGNIFICANT CHANGE UP (ref 0.02–0.1)
PROT SERPL-MCNC: 7.4 G/DL — SIGNIFICANT CHANGE UP (ref 6–8.3)
PROTEIN URINE: NEGATIVE MG/DL
PSA FREE FLD-MCNC: 26 %
PSA FREE SERPL-MCNC: 0.48 NG/ML
PSA SERPL-MCNC: 1.82 NG/ML
RBC # BLD: 5.1 M/UL — SIGNIFICANT CHANGE UP (ref 4.2–5.8)
RBC # FLD: 12.8 % — SIGNIFICANT CHANGE UP (ref 10.3–14.5)
RED BLOOD CELLS URINE: 1 /HPF
RH IG SCN BLD-IMP: NEGATIVE — SIGNIFICANT CHANGE UP
SAO2 % BLDV: 84 % — SIGNIFICANT CHANGE UP (ref 67–88)
SODIUM SERPL-SCNC: 139 MMOL/L — SIGNIFICANT CHANGE UP (ref 135–145)
SPECIFIC GRAVITY URINE: 1.02
TROPONIN T, HIGH SENSITIVITY RESULT: 21 NG/L — SIGNIFICANT CHANGE UP (ref 0–51)
UROBILINOGEN URINE: 1 MG/DL
WBC # BLD: 8.38 K/UL — SIGNIFICANT CHANGE UP (ref 3.8–10.5)
WBC # FLD AUTO: 8.38 K/UL — SIGNIFICANT CHANGE UP (ref 3.8–10.5)
WHITE BLOOD CELLS URINE: 0 /HPF

## 2023-10-06 PROCEDURE — 74174 CTA ABD&PLVS W/CONTRAST: CPT | Mod: 26,MA

## 2023-10-06 PROCEDURE — 71045 X-RAY EXAM CHEST 1 VIEW: CPT | Mod: 26

## 2023-10-06 PROCEDURE — 99292 CRITICAL CARE ADDL 30 MIN: CPT

## 2023-10-06 PROCEDURE — 71275 CT ANGIOGRAPHY CHEST: CPT | Mod: 26,MA

## 2023-10-06 PROCEDURE — 99291 CRITICAL CARE FIRST HOUR: CPT

## 2023-10-06 PROCEDURE — 70450 CT HEAD/BRAIN W/O DYE: CPT | Mod: 26,MA

## 2023-10-06 RX ORDER — SODIUM CHLORIDE 9 MG/ML
500 INJECTION INTRAMUSCULAR; INTRAVENOUS; SUBCUTANEOUS ONCE
Refills: 0 | Status: COMPLETED | OUTPATIENT
Start: 2023-10-06 | End: 2023-10-06

## 2023-10-06 RX ADMIN — SODIUM CHLORIDE 500 MILLILITER(S): 9 INJECTION INTRAMUSCULAR; INTRAVENOUS; SUBCUTANEOUS at 23:36

## 2023-10-06 NOTE — ED PROVIDER NOTE - CARE PLAN
1 Principal Discharge DX:	NSTEMI (non-ST elevation myocardial infarction)  Secondary Diagnosis:	Syncope

## 2023-10-06 NOTE — ED PROVIDER NOTE - OBJECTIVE STATEMENT
73-year-old male with a past medical history of hypertension, hyperlipidemia, aortic root aneurysm status postrepair in 2016 presenting after becoming unresponsive watching television. Patient wife called EMS who states the patient was screaming and being combative, gave him 5 mg of midazolam.

## 2023-10-06 NOTE — ED PROVIDER NOTE - PROGRESS NOTE DETAILS
Patient reassessed, reports resolution of chest pain, reviewed current results and negative CT findings with patient, resting comfortably at this time, agreeable to admission.  Follows with Dr. Ferrer for cardiology.  TBA on telemetry pending repeat VBG. - Woodrow Blunt, PGY-3 MD Panchal: patient signed out to me by Armando Beatty.  73-year-old male syncopal episode while watching television.  Patient has a history of aortic dissection repair.  CTA chest reveals no aortic pathology or bleeding around the stent.  CT head is unremarkable.  However, patient has a large delta troponin:  21 -->263, and will need to be admitted for eval NSTEMI; heparin ordered given unremarkable CTA chest and CT head.  Currently denies chest pain or shortness of breath. Patient reassessed, reports resolution of chest pain, reviewed current results and negative CT findings with patient, resting comfortably at this time, agreeable to admission.  Follows with Dr. Johnathon Ferrer for cardiology.  TBA on telemetry pending repeat VBG. - Woodrow Blunt, PGY-3 Cardiology consulted for NSTEMI, patient updated regarding delta troponin and pending consult, denies active chest pain at this time. - Woodrow Blunt, PGY-3 MD Panchal: patient signed out to me by Armando Beatty.  73-year-old male syncopal episode while watching television.  Patient has a history of aortic dissection repair.  CTA chest reveals no aortic pathology or bleeding around the stent.  CT head is unremarkable.  However, patient has a large delta troponin:  21 -->263, and will need to be admitted for eval NSTEMI; heparin ordered given unremarkable CTA chest and CT head.  Currently denies chest pain or shortness of breath.  Cardiology consulted and agree with ASA + Heparin

## 2023-10-06 NOTE — ED PROVIDER NOTE - ATTENDING CONTRIBUTION TO CARE
------------ATTENDING NOTE------------  pt w/ wife brought to ED by EMS, per EMS called for pt falling after getting up screaming, they found him combative/yelling and gave Midazolam 5mg IV, became sedated, per wife pt got up suddenly while watching TV and fell on couch and hit head on table, pt awake but sedate on arrival / following commands, cannot recall events, no current HA / chest pain / sob, EMS reported HTN (SBPs 240s), complicated h/p thoracic Ao repair, awaiting labs/imaging and close reassessments -->  - Armando Beatty MD   ---------------------------------------------- ------------ATTENDING NOTE------------  pt w/ wife brought to ED by EMS, per EMS called for pt falling after getting up screaming, they found him combative/yelling and gave Midazolam 5mg IV, became sedated, per wife pt got up suddenly while watching TV and fell on couch and hit head on table, pt awake but sedate on arrival / following commands, cannot recall events, no current HA / chest pain / sob, EMS reported HTN (SBPs 240s), complicated h/p thoracic Ao repair, awaiting labs/imaging and close reassessments --> ED sign out 2300 pending close reassessments, planned admission for continued kelli mcmullen, [facility].  - Armando Beatty MD   ----------------------------------------------

## 2023-10-06 NOTE — ED PROVIDER NOTE - PHYSICAL EXAMINATION
General: Awake and sedated  Mentation: AAO x 1  HEENT: airway patent, conjunctivae clear bilaterally  Resp: symmetric chest rise, no resp distress, breath sounds CTA bilaterally  Cardio: RRR, no m/r/g  GI: soft/nondistended/nontender  Neuro: sensation and motor function grossly intact  Skin: no cyanosis, no jaundice  MSK: normal movement of all extremities  Lymph/Vasc: no LE edema

## 2023-10-06 NOTE — ED ADULT NURSE NOTE - NSFALLUNIVINTERV_ED_ALL_ED
Bed/Stretcher in lowest position, wheels locked, appropriate side rails in place/Call bell, personal items and telephone in reach/Instruct patient to call for assistance before getting out of bed/chair/stretcher/Non-slip footwear applied when patient is off stretcher/[place] to call system/Physically safe environment - no spills, clutter or unnecessary equipment/Purposeful proactive rounding/Room/bathroom lighting operational, light cord in reach

## 2023-10-06 NOTE — ED ADULT NURSE REASSESSMENT NOTE - NS ED NURSE REASSESS COMMENT FT1
Pt alert and awake A&Ox3, Denies any complaints at this time. Equal B/L upper and lower extremity strength, skin is warm and normal color for race. No diaphoresis or edema noted.

## 2023-10-06 NOTE — ED ADULT NURSE NOTE - OBJECTIVE STATEMENT
72 y/o Male presents to ED from home via EMS with c/o unresponsiveness. PMH of HTN, HLD . Per EMS, pt was sitting in chair and got up/ screamed then fell to ground and was unresponsive witnessed by family member. Pt was combative in ambulance given 5 versed at 2049. Denies SOB, CP, HA, dizziness, vision changes, N/V/D, back pain, urinary s/s. Pt is A&Ox3, Airway patent with spontaneous unlabored breathing Pt sating 90 on RA pt placed on 2L O2. Upon arrival pt lethargic and not following commands. Pt placed on continuous cardiac monitor. IV inserted labs drawn and sent. Safety maintained bed is in the lowest position, locked and call bell in reach.

## 2023-10-07 DIAGNOSIS — Z29.9 ENCOUNTER FOR PROPHYLACTIC MEASURES, UNSPECIFIED: ICD-10-CM

## 2023-10-07 DIAGNOSIS — I71.20 THORACIC AORTIC ANEURYSM, WITHOUT RUPTURE, UNSPECIFIED: ICD-10-CM

## 2023-10-07 DIAGNOSIS — R55 SYNCOPE AND COLLAPSE: ICD-10-CM

## 2023-10-07 DIAGNOSIS — I10 ESSENTIAL (PRIMARY) HYPERTENSION: ICD-10-CM

## 2023-10-07 DIAGNOSIS — I21.4 NON-ST ELEVATION (NSTEMI) MYOCARDIAL INFARCTION: ICD-10-CM

## 2023-10-07 DIAGNOSIS — Z98.890 OTHER SPECIFIED POSTPROCEDURAL STATES: Chronic | ICD-10-CM

## 2023-10-07 DIAGNOSIS — E78.5 HYPERLIPIDEMIA, UNSPECIFIED: ICD-10-CM

## 2023-10-07 DIAGNOSIS — Z95.2 PRESENCE OF PROSTHETIC HEART VALVE: Chronic | ICD-10-CM

## 2023-10-07 LAB
A1C WITH ESTIMATED AVERAGE GLUCOSE RESULT: 6.2 % — HIGH (ref 4–5.6)
ANION GAP SERPL CALC-SCNC: 6 MMOL/L — SIGNIFICANT CHANGE UP (ref 5–17)
APPEARANCE UR: CLEAR — SIGNIFICANT CHANGE UP
APTT BLD: 28.2 SEC — SIGNIFICANT CHANGE UP (ref 24.5–35.6)
APTT BLD: 45.7 SEC — HIGH (ref 24.5–35.6)
APTT BLD: 50.1 SEC — HIGH (ref 24.5–35.6)
APTT BLD: 54.8 SEC — HIGH (ref 24.5–35.6)
BACTERIA # UR AUTO: NEGATIVE — SIGNIFICANT CHANGE UP
BASE EXCESS BLDV CALC-SCNC: 2 MMOL/L — SIGNIFICANT CHANGE UP (ref -2–3)
BASOPHILS # BLD AUTO: 0.01 K/UL — SIGNIFICANT CHANGE UP (ref 0–0.2)
BASOPHILS NFR BLD AUTO: 0.1 % — SIGNIFICANT CHANGE UP (ref 0–2)
BILIRUB UR-MCNC: NEGATIVE — SIGNIFICANT CHANGE UP
BUN SERPL-MCNC: 17 MG/DL — SIGNIFICANT CHANGE UP (ref 7–23)
CA-I SERPL-SCNC: 1.15 MMOL/L — SIGNIFICANT CHANGE UP (ref 1.15–1.33)
CALCIUM SERPL-MCNC: 8.8 MG/DL — SIGNIFICANT CHANGE UP (ref 8.4–10.5)
CHLORIDE BLDV-SCNC: 104 MMOL/L — SIGNIFICANT CHANGE UP (ref 96–108)
CHLORIDE SERPL-SCNC: 105 MMOL/L — SIGNIFICANT CHANGE UP (ref 96–108)
CK MB BLD-MCNC: 1.7 % — SIGNIFICANT CHANGE UP (ref 0–3.5)
CK MB BLD-MCNC: 2.2 % — SIGNIFICANT CHANGE UP (ref 0–3.5)
CK MB BLD-MCNC: 2.5 % — SIGNIFICANT CHANGE UP (ref 0–3.5)
CK MB CFR SERPL CALC: 12.3 NG/ML — HIGH (ref 0–6.7)
CK MB CFR SERPL CALC: 15.1 NG/ML — HIGH (ref 0–6.7)
CK MB CFR SERPL CALC: 17 NG/ML — HIGH (ref 0–6.7)
CK SERPL-CCNC: 499 U/L — HIGH (ref 30–200)
CK SERPL-CCNC: 765 U/L — HIGH (ref 30–200)
CK SERPL-CCNC: 875 U/L — HIGH (ref 30–200)
CO2 BLDV-SCNC: 29 MMOL/L — HIGH (ref 22–26)
CO2 SERPL-SCNC: 26 MMOL/L — SIGNIFICANT CHANGE UP (ref 22–31)
COLOR SPEC: SIGNIFICANT CHANGE UP
CREAT SERPL-MCNC: 0.93 MG/DL — SIGNIFICANT CHANGE UP (ref 0.5–1.3)
DIFF PNL FLD: NEGATIVE — SIGNIFICANT CHANGE UP
EGFR: 86 ML/MIN/1.73M2 — SIGNIFICANT CHANGE UP
EOSINOPHIL # BLD AUTO: 0.07 K/UL — SIGNIFICANT CHANGE UP (ref 0–0.5)
EOSINOPHIL NFR BLD AUTO: 0.6 % — SIGNIFICANT CHANGE UP (ref 0–6)
EPI CELLS # UR: 1 /HPF — SIGNIFICANT CHANGE UP
ESTIMATED AVERAGE GLUCOSE: 131 MG/DL — HIGH (ref 68–114)
GAS PNL BLDV: 135 MMOL/L — LOW (ref 136–145)
GAS PNL BLDV: SIGNIFICANT CHANGE UP
GLUCOSE BLDV-MCNC: 145 MG/DL — HIGH (ref 70–99)
GLUCOSE SERPL-MCNC: 131 MG/DL — HIGH (ref 70–99)
GLUCOSE UR QL: NEGATIVE — SIGNIFICANT CHANGE UP
HCO3 BLDV-SCNC: 28 MMOL/L — SIGNIFICANT CHANGE UP (ref 22–29)
HCT VFR BLD CALC: 41.2 % — SIGNIFICANT CHANGE UP (ref 39–50)
HCT VFR BLD CALC: 42.2 % — SIGNIFICANT CHANGE UP (ref 39–50)
HCT VFR BLDA CALC: 42 % — SIGNIFICANT CHANGE UP (ref 39–51)
HGB BLD CALC-MCNC: 14 G/DL — SIGNIFICANT CHANGE UP (ref 12.6–17.4)
HGB BLD-MCNC: 14 G/DL — SIGNIFICANT CHANGE UP (ref 13–17)
HGB BLD-MCNC: 14.4 G/DL — SIGNIFICANT CHANGE UP (ref 13–17)
HYALINE CASTS # UR AUTO: 0 /LPF — SIGNIFICANT CHANGE UP (ref 0–2)
IMM GRANULOCYTES NFR BLD AUTO: 0.4 % — SIGNIFICANT CHANGE UP (ref 0–0.9)
INR BLD: 1.12 RATIO — SIGNIFICANT CHANGE UP (ref 0.85–1.18)
KETONES UR-MCNC: NEGATIVE — SIGNIFICANT CHANGE UP
LACTATE BLDV-MCNC: 1.5 MMOL/L — SIGNIFICANT CHANGE UP (ref 0.5–2)
LEUKOCYTE ESTERASE UR-ACNC: NEGATIVE — SIGNIFICANT CHANGE UP
LYMPHOCYTES # BLD AUTO: 18.6 % — SIGNIFICANT CHANGE UP (ref 13–44)
LYMPHOCYTES # BLD AUTO: 2.04 K/UL — SIGNIFICANT CHANGE UP (ref 1–3.3)
MAGNESIUM SERPL-MCNC: 2.1 MG/DL — SIGNIFICANT CHANGE UP (ref 1.6–2.6)
MCHC RBC-ENTMCNC: 29.9 PG — SIGNIFICANT CHANGE UP (ref 27–34)
MCHC RBC-ENTMCNC: 30.2 PG — SIGNIFICANT CHANGE UP (ref 27–34)
MCHC RBC-ENTMCNC: 34 GM/DL — SIGNIFICANT CHANGE UP (ref 32–36)
MCHC RBC-ENTMCNC: 34.1 GM/DL — SIGNIFICANT CHANGE UP (ref 32–36)
MCV RBC AUTO: 88 FL — SIGNIFICANT CHANGE UP (ref 80–100)
MCV RBC AUTO: 88.5 FL — SIGNIFICANT CHANGE UP (ref 80–100)
MONOCYTES # BLD AUTO: 0.71 K/UL — SIGNIFICANT CHANGE UP (ref 0–0.9)
MONOCYTES NFR BLD AUTO: 6.5 % — SIGNIFICANT CHANGE UP (ref 2–14)
NEUTROPHILS # BLD AUTO: 8.08 K/UL — HIGH (ref 1.8–7.4)
NEUTROPHILS NFR BLD AUTO: 73.8 % — SIGNIFICANT CHANGE UP (ref 43–77)
NITRITE UR-MCNC: NEGATIVE — SIGNIFICANT CHANGE UP
NRBC # BLD: 0 /100 WBCS — SIGNIFICANT CHANGE UP (ref 0–0)
PCO2 BLDV: 47 MMHG — SIGNIFICANT CHANGE UP (ref 42–55)
PH BLDV: 7.38 — SIGNIFICANT CHANGE UP (ref 7.32–7.43)
PH UR: 6 — SIGNIFICANT CHANGE UP (ref 5–8)
PHOSPHATE SERPL-MCNC: 2.5 MG/DL — SIGNIFICANT CHANGE UP (ref 2.5–4.5)
PLATELET # BLD AUTO: 165 K/UL — SIGNIFICANT CHANGE UP (ref 150–400)
PLATELET # BLD AUTO: 169 K/UL — SIGNIFICANT CHANGE UP (ref 150–400)
PO2 BLDV: 38 MMHG — SIGNIFICANT CHANGE UP (ref 25–45)
POTASSIUM BLDV-SCNC: 3.9 MMOL/L — SIGNIFICANT CHANGE UP (ref 3.5–5.1)
POTASSIUM SERPL-MCNC: 4.5 MMOL/L — SIGNIFICANT CHANGE UP (ref 3.5–5.3)
POTASSIUM SERPL-SCNC: 4.5 MMOL/L — SIGNIFICANT CHANGE UP (ref 3.5–5.3)
PROT UR-MCNC: ABNORMAL
PROTHROM AB SERPL-ACNC: 12.3 SEC — SIGNIFICANT CHANGE UP (ref 9.5–13)
RAPID RVP RESULT: SIGNIFICANT CHANGE UP
RBC # BLD: 4.68 M/UL — SIGNIFICANT CHANGE UP (ref 4.2–5.8)
RBC # BLD: 4.77 M/UL — SIGNIFICANT CHANGE UP (ref 4.2–5.8)
RBC # FLD: 12.8 % — SIGNIFICANT CHANGE UP (ref 10.3–14.5)
RBC # FLD: 13 % — SIGNIFICANT CHANGE UP (ref 10.3–14.5)
RBC CASTS # UR COMP ASSIST: 1 /HPF — SIGNIFICANT CHANGE UP (ref 0–4)
SAO2 % BLDV: 66.5 % — LOW (ref 67–88)
SARS-COV-2 RNA SPEC QL NAA+PROBE: SIGNIFICANT CHANGE UP
SODIUM SERPL-SCNC: 137 MMOL/L — SIGNIFICANT CHANGE UP (ref 135–145)
SP GR SPEC: 1.04 — HIGH (ref 1.01–1.02)
TROPONIN T, HIGH SENSITIVITY RESULT: 177 NG/L — HIGH (ref 0–51)
TROPONIN T, HIGH SENSITIVITY RESULT: 263 NG/L — HIGH (ref 0–51)
TROPONIN T, HIGH SENSITIVITY RESULT: 357 NG/L — HIGH (ref 0–51)
TROPONIN T, HIGH SENSITIVITY RESULT: 410 NG/L — HIGH (ref 0–51)
TSH SERPL-MCNC: 1.83 UIU/ML — SIGNIFICANT CHANGE UP (ref 0.27–4.2)
UROBILINOGEN FLD QL: NEGATIVE — SIGNIFICANT CHANGE UP
WBC # BLD: 10.03 K/UL — SIGNIFICANT CHANGE UP (ref 3.8–10.5)
WBC # BLD: 10.95 K/UL — HIGH (ref 3.8–10.5)
WBC # FLD AUTO: 10.03 K/UL — SIGNIFICANT CHANGE UP (ref 3.8–10.5)
WBC # FLD AUTO: 10.95 K/UL — HIGH (ref 3.8–10.5)
WBC UR QL: 2 /HPF — SIGNIFICANT CHANGE UP (ref 0–5)

## 2023-10-07 PROCEDURE — 99223 1ST HOSP IP/OBS HIGH 75: CPT | Mod: GC

## 2023-10-07 PROCEDURE — 93306 TTE W/DOPPLER COMPLETE: CPT | Mod: 26

## 2023-10-07 PROCEDURE — 76376 3D RENDER W/INTRP POSTPROCES: CPT | Mod: 26

## 2023-10-07 PROCEDURE — 99223 1ST HOSP IP/OBS HIGH 75: CPT

## 2023-10-07 PROCEDURE — 93356 MYOCRD STRAIN IMG SPCKL TRCK: CPT | Mod: 26

## 2023-10-07 RX ORDER — HEPARIN SODIUM 5000 [USP'U]/ML
5000 INJECTION INTRAVENOUS; SUBCUTANEOUS ONCE
Refills: 0 | Status: COMPLETED | OUTPATIENT
Start: 2023-10-07 | End: 2023-10-07

## 2023-10-07 RX ORDER — ACETAMINOPHEN 500 MG
650 TABLET ORAL EVERY 6 HOURS
Refills: 0 | Status: DISCONTINUED | OUTPATIENT
Start: 2023-10-07 | End: 2023-10-10

## 2023-10-07 RX ORDER — HEPARIN SODIUM 5000 [USP'U]/ML
INJECTION INTRAVENOUS; SUBCUTANEOUS
Qty: 25000 | Refills: 0 | Status: DISCONTINUED | OUTPATIENT
Start: 2023-10-07 | End: 2023-10-09

## 2023-10-07 RX ORDER — ASPIRIN/CALCIUM CARB/MAGNESIUM 324 MG
324 TABLET ORAL ONCE
Refills: 0 | Status: COMPLETED | OUTPATIENT
Start: 2023-10-07 | End: 2023-10-07

## 2023-10-07 RX ORDER — HEPARIN SODIUM 5000 [USP'U]/ML
5000 INJECTION INTRAVENOUS; SUBCUTANEOUS EVERY 6 HOURS
Refills: 0 | Status: DISCONTINUED | OUTPATIENT
Start: 2023-10-07 | End: 2023-10-07

## 2023-10-07 RX ORDER — HEPARIN SODIUM 5000 [USP'U]/ML
5000 INJECTION INTRAVENOUS; SUBCUTANEOUS ONCE
Refills: 0 | Status: DISCONTINUED | OUTPATIENT
Start: 2023-10-07 | End: 2023-10-07

## 2023-10-07 RX ORDER — SIMVASTATIN 20 MG/1
20 TABLET, FILM COATED ORAL AT BEDTIME
Refills: 0 | Status: DISCONTINUED | OUTPATIENT
Start: 2023-10-07 | End: 2023-10-08

## 2023-10-07 RX ORDER — HEPARIN SODIUM 5000 [USP'U]/ML
INJECTION INTRAVENOUS; SUBCUTANEOUS
Qty: 25000 | Refills: 0 | Status: DISCONTINUED | OUTPATIENT
Start: 2023-10-07 | End: 2023-10-07

## 2023-10-07 RX ORDER — HEPARIN SODIUM 5000 [USP'U]/ML
6000 INJECTION INTRAVENOUS; SUBCUTANEOUS EVERY 6 HOURS
Refills: 0 | Status: DISCONTINUED | OUTPATIENT
Start: 2023-10-07 | End: 2023-10-09

## 2023-10-07 RX ORDER — TAMSULOSIN HYDROCHLORIDE 0.4 MG/1
0.4 CAPSULE ORAL AT BEDTIME
Refills: 0 | Status: DISCONTINUED | OUTPATIENT
Start: 2023-10-07 | End: 2023-10-10

## 2023-10-07 RX ORDER — INFLUENZA VIRUS VACCINE 15; 15; 15; 15 UG/.5ML; UG/.5ML; UG/.5ML; UG/.5ML
0.7 SUSPENSION INTRAMUSCULAR ONCE
Refills: 0 | Status: DISCONTINUED | OUTPATIENT
Start: 2023-10-07 | End: 2023-10-10

## 2023-10-07 RX ADMIN — HEPARIN SODIUM 1200 UNIT(S)/HR: 5000 INJECTION INTRAVENOUS; SUBCUTANEOUS at 16:53

## 2023-10-07 RX ADMIN — HEPARIN SODIUM 5000 UNIT(S): 5000 INJECTION INTRAVENOUS; SUBCUTANEOUS at 02:20

## 2023-10-07 RX ADMIN — Medication 324 MILLIGRAM(S): at 00:45

## 2023-10-07 RX ADMIN — TAMSULOSIN HYDROCHLORIDE 0.4 MILLIGRAM(S): 0.4 CAPSULE ORAL at 22:22

## 2023-10-07 RX ADMIN — HEPARIN SODIUM 1000 UNIT(S)/HR: 5000 INJECTION INTRAVENOUS; SUBCUTANEOUS at 02:57

## 2023-10-07 RX ADMIN — HEPARIN SODIUM 1200 UNIT(S)/HR: 5000 INJECTION INTRAVENOUS; SUBCUTANEOUS at 19:33

## 2023-10-07 RX ADMIN — HEPARIN SODIUM 1400 UNIT(S)/HR: 5000 INJECTION INTRAVENOUS; SUBCUTANEOUS at 23:24

## 2023-10-07 RX ADMIN — HEPARIN SODIUM 1000 UNIT(S)/HR: 5000 INJECTION INTRAVENOUS; SUBCUTANEOUS at 02:20

## 2023-10-07 RX ADMIN — SIMVASTATIN 20 MILLIGRAM(S): 20 TABLET, FILM COATED ORAL at 22:22

## 2023-10-07 RX ADMIN — HEPARIN SODIUM 1000 UNIT(S)/HR: 5000 INJECTION INTRAVENOUS; SUBCUTANEOUS at 10:16

## 2023-10-07 RX ADMIN — HEPARIN SODIUM 1000 UNIT(S)/HR: 5000 INJECTION INTRAVENOUS; SUBCUTANEOUS at 07:26

## 2023-10-07 RX ADMIN — HEPARIN SODIUM 1200 UNIT(S)/HR: 5000 INJECTION INTRAVENOUS; SUBCUTANEOUS at 21:05

## 2023-10-07 NOTE — H&P ADULT - NSICDXPASTMEDICALHX_GEN_ALL_CORE_FT
PAST MEDICAL HISTORY:  AI (aortic insufficiency)     HLD (hyperlipidemia)     HTN (hypertension)     Thoracic aortic aneurysm (TAA)

## 2023-10-07 NOTE — H&P ADULT - PROBLEM SELECTOR PLAN 1
- No prodromal symptoms   - No tongue biting or incontinence to suggest seizure  - CTH unremarkable and w/out acute neuro deficits to suggest CVA  - TTE to assess for wall motion abnormality   - Monitor on tele for arrythmia  - Orthostatics

## 2023-10-07 NOTE — PATIENT PROFILE ADULT - FALL HARM RISK - HARM RISK INTERVENTIONS
Assistance with ambulation/Assistance OOB with selected safe patient handling equipment/Communicate Risk of Fall with Harm to all staff/Monitor for mental status changes/Monitor gait and stability/Reinforce activity limits and safety measures with patient and family/Reorient to person, place and time as needed/Review medications for side effects contributing to fall risk/Sit up slowly, dangle for a short time, stand at bedside before walking/Tailored Fall Risk Interventions/Use of alarms - bed, chair and/or voice tab/Visual Cue: Yellow wristband and red socks/Bed in lowest position, wheels locked, appropriate side rails in place/Call bell, personal items and telephone in reach/Instruct patient to call for assistance before getting out of bed or chair/Non-slip footwear when patient is out of bed/[place] to call system/Physically safe environment - no spills, clutter or unnecessary equipment/Purposeful Proactive Rounding/Room/bathroom lighting operational, light cord in reach

## 2023-10-07 NOTE — H&P ADULT - PROBLEM SELECTOR PLAN 2
- Trop 21 -> 263  - Denies any CP and ECG w/out acute ischemic changes  - Monitor on tele  - Trend CE  - Hep gtt  - Cards following, recs appreciated

## 2023-10-07 NOTE — H&P ADULT - NSHPPHYSICALEXAM_GEN_ALL_CORE
Vital Signs Last 24 Hrs  T(C): 36.4 (06 Oct 2023 22:11), Max: 36.4 (06 Oct 2023 22:11)  T(F): 97.5 (06 Oct 2023 22:11), Max: 97.5 (06 Oct 2023 22:11)  HR: 67 (07 Oct 2023 00:50) (67 - 71)  BP: 134/84 (07 Oct 2023 00:50) (125/79 - 134/84)  RR: 18 (07 Oct 2023 00:50) (18 - 19)  SpO2: 95% (07 Oct 2023 00:50) (95% - 95%)    CONSTITUTIONAL: Well-groomed, in no apparent distress  EYES: No conjunctival or scleral injection, non-icteric;   ENMT: No external nasal lesions; MMM  NECK: Trachea midline without palpable neck mass; thyroid not enlarged and non-tender  RESPIRATORY: Breathing comfortably; no dullness to percussion; lungs CTA without wheeze/rhonchi/rales  CARDIOVASCULAR: +S1S2, RRR, no M/G/R; pedal pulses full and symmetric; no lower extremity edema  GASTROINTESTINAL: No palpable masses or tenderness, +BS throughout, no rebound/guarding; no hepatosplenomegaly; no hernia palpated  LYMPHATIC: No cervical LAD or tenderness  SKIN: No rashes or ulcers noted  NEUROLOGIC: CN II-XII intact; sensation intact in LEs b/l to light touch  PSYCHIATRIC: A&Ox3; mood and affect appropriate; appropriate insight and judgment

## 2023-10-07 NOTE — H&P ADULT - NSHPREVIEWOFSYSTEMS_GEN_ALL_CORE
CONSTITUTIONAL: No fever, weight loss  EYES: No eye pain, visual disturbances, or discharge  ENMT:  No difficulty hearing, tinnitus, vertigo; No sinus or throat pain  RESPIRATORY: No SOB. No cough, wheezing, chills or hemoptysis  CARDIOVASCULAR: No chest pain, palpitations, dizziness, or leg swelling  GASTROINTESTINAL: No abdominal or epigastric pain. No nausea, vomiting, or hematemesis; No diarrhea or constipation. No melena or hematochezia.  GENITOURINARY: No dysuria, frequency, hematuria, or incontinence  NEUROLOGICAL: No headaches, memory loss, loss of strength, numbness, or tremors  SKIN: No itching, burning, rashes, or lesions   LYMPH NODES: No enlarged glands  ENDOCRINE: No heat or cold intolerance; No hair loss  MUSCULOSKELETAL: No joint pain or swelling; No muscle, back pain  PSYCHIATRIC: No depression, anxiety, mood swings, or difficulty sleeping  HEME/LYMPH: No easy bruising, or bleeding gums

## 2023-10-07 NOTE — PATIENT PROFILE ADULT - FALL HARM RISK - CONCLUSION
Fall with Harm Risk Odomzo Counseling- I discussed with the patient the risks of Odomzo including but not limited to nausea, vomiting, diarrhea, constipation, weight loss, changes in the sense of taste, decreased appetite, muscle spasms, and hair loss.  The patient verbalized understanding of the proper use and possible adverse effects of Odomzo.  All of the patient's questions and concerns were addressed.

## 2023-10-07 NOTE — H&P ADULT - NSICDXFAMILYHX_GEN_ALL_CORE_FT
FAMILY HISTORY:  Father  Still living? Unknown  FH: leukemia, Age at diagnosis: Age Unknown    Mother  Still living? Unknown  FH: myocardial infarction, Age at diagnosis: Age Unknown    Sibling  Still living? Unknown  FH: colon cancer, Age at diagnosis: Age Unknown

## 2023-10-07 NOTE — H&P ADULT - NSHPLABSRESULTS_GEN_ALL_CORE
LABS:                      14.9   8.38  )-----------( 186      ( 06 Oct 2023 21:26 )             45.2     10-06    139  |  104  |  20  ----------------------------<  162<H>  3.5   |  20<L>  |  1.07    Ca    9.1      06 Oct 2023 21:26  Mg     2.1     10-06    TPro  7.4  /  Alb  4.5  /  TBili  0.4  /  DBili  x   /  AST  19  /  ALT  21  /  AlkPhos  52  10-06    LIVER FUNCTIONS - ( 06 Oct 2023 21:26 )  Alb: 4.5 g/dL / Pro: 7.4 g/dL / ALK PHOS: 52 U/L / ALT: 21 U/L / AST: 19 U/L / GGT: x           Urinalysis Basic - ( 06 Oct 2023 21:26 )  Color: x / Appearance: x / SG: x / pH: x  Gluc: 162 mg/dL / Ketone: x  / Bili: x / Urobili: x   Blood: x / Protein: x / Nitrite: x   Leuk Esterase: x / RBC: x / WBC x   Sq Epi: x / Non Sq Epi: x / Bacteria: x    IMAGING:  CT Angio Abdomen and Pelvis w/ IV Cont (10.06.23 @ 21:54)  IMPRESSION:  - No acute findings. No evidence of dissection or hemorrhage or acute aortic pathology.  - Status post aortic valve replacement and replacement of the ascending thoracic aorta. 4.9 cm diameter aneurysmal dilation of the residual native aortic root at the level of the coronary sinuses.    CT Head No Cont (10.06.23 @ 21:46)  IMPRESSION:  - No acute intracranial findings.    [X] Imaging personally reviewed by me- No pathology within the CTA Chest   [X] ECG personally interpreted by me- No OSWALD in contiguous leads

## 2023-10-07 NOTE — CHART NOTE - NSCHARTNOTEFT_GEN_A_CORE
72yo M w/ PMH of HTN, HLD, TAA and AI s/p AVR and ascending aorta repair in 2018 pw syncopal episode.  Pt w/ syncopal episode while watching TV this AM w/ reported unresponsiveness for a couple of minutes prompting call to EMS. Upon EMS's arrival, pt was awake, confused, and combative s/p Midazolam 5mg in ED. Patient with elevated Troponin seen by Cardiology overnight NSTEMI unable to be ruled out. Troponins peaked, patient to have syncope workup and afterward EP evaluation.       Exam: Patient seen at the bedside in no acute distress, Lungs clear to auscultation, full range of motion, no acute complaints      1) Syncope vs NSTEMI    ECG with septal Q waves. Troponin T peak 410.   TTE shows moderately reduced LVSF with SWMA.  Cardiology evaluated the patient recommend ASA 81mg qd,   Lipid panel to be done in AM,   Plan for LHC, continue   Telemetry monitoring.   EP eval after workup above done.       Will followup with cardiology as to timing of LHC.

## 2023-10-07 NOTE — H&P ADULT - HISTORY OF PRESENT ILLNESS
Pt is a 72yo M w/ PMH of HTN, HLD, TAA and AI s/p AVR and ascending aorta repair in 2018 pw syncopal episode.     Hx obtained from pt and wife at bedside. Pt w/ syncopal episode while watching TV this AM w/ reported unresponsiveness for a couple of minutes prompting call to EMS. Upon EMS's arrival, pt was awake, confused, and combative s/p Midazolam 5mg.     In the ED pt back to baseline, denies any CP, SOB, diaphoresis, palpitations or feeling lightheaded prior to event or at time of interview.     Vitals otherwise stable w/ labs notable for Trop 21 -> 263 and lactate 4.3 -> 1.5. He underwent CTH and CT C/A/P which were unremarkable He was administered ASA 324mg and started on heparin gtt for NSTEMI.

## 2023-10-07 NOTE — CONSULT NOTE ADULT - SUBJECTIVE AND OBJECTIVE BOX
Oscar Freedman MD  Cardiology Fellow  [phone]  All Cardiology service information can be found 24/7 on [email or web address], password: [email or web address]    Patient seen and evaluated at bedside    Chief Complaint: syncope    HPI: 73M with HTN, HLD, TAA and AI s/p AVR and ascending aorta repair 2018 presenting after a syncopal episode. Was witnessed by wife to be unresponsive while watching TV, slid to the ground. Called EMS, he woke up and was confused and combative, recieved 5mg midazolam en route to the ED. Pt states he had a similar syncopal episode in June where he hit his head, is unaware of what workup was done at that time. He denies any chest pain at this time, says he didnt really have chest pain.     In the ED, CTA with no acute findings.     Recently seen in cardiology clinic on 5/2023, was doing well at that time.    Non-obstructive CAD with 30% RCA lesion at cardiac cath in April 2016 by Dr. DAMI Wilder.     Prior TTE 2021 in the system with EF 59%, AV mean gradient 14, peak 26.      Allergies:  No Known Allergies      Home Meds:    Current Medications:   heparin   Injectable 5000 Unit(s) IV Push once  heparin   Injectable 6000 Unit(s) IV Push every 6 hours PRN  heparin  Infusion.  Unit(s)/Hr IV Continuous <Continuous>    REVIEW OF SYSTEMS:  CONSTITUTIONAL: No weakness, fevers or chills  EYES/ENT: No visual changes;  No dysphagia  NECK: No pain or stiffness  RESPIRATORY: No cough, wheezing, hemoptysis; No shortness of breath  CARDIOVASCULAR: No chest pain or palpitations; No lower extremity edema  GASTROINTESTINAL: No abdominal or epigastric pain. No nausea, vomiting, or hematemesis; No diarrhea or constipation. No melena or hematochezia.  BACK: No back pain  GENITOURINARY: No dysuria, frequency or hematuria  NEUROLOGICAL: No numbness or weakness  SKIN: No itching, burning, rashes, or lesions   All other review of systems is negative unless indicated above.    Physical Exam:  T(F): 97.5 (10-06), Max: 97.5 (10-06)  HR: 67 (10-07) (67 - 71)  BP: 134/84 (10-07) (125/79 - 134/84)  RR: 18 (10-07)  SpO2: 95% (10-07)  GENERAL: No acute distress, well-developed  HEAD:  Atraumatic, Normocephalic  ENT: EOMI, PERRL, conjunctiva and sclera clear, Neck supple, No JVD  CHEST/LUNG: Clear to auscultation bilaterally; No wheeze  HEART: Regular rate and rhythm; No murmur  ABDOMEN: Soft, Nontender, Nondistended  EXTREMITIES:  No edema  PSYCH: Nl behavior, nl affect      CXR: Personally reviewed    Labs: Personally reviewed                        14.9   8.38  )-----------( 186      ( 06 Oct 2023 21:26 )             45.2     10-06    139  |  104  |  20  ----------------------------<  162<H>  3.5   |  20<L>  |  1.07    Ca    9.1      06 Oct 2023 21:26  Mg     2.1     10-06    TPro  7.4  /  Alb  4.5  /  TBili  0.4  /  DBili  x   /  AST  19  /  ALT  21  /  AlkPhos  52  10-06        CARDIAC MARKERS ( 06 Oct 2023 23:46 )  263 ng/L / x     / x     / x     / x     / x      CARDIAC MARKERS ( 06 Oct 2023 21:26 )  21 ng/L / x     / x     / x     / x     / x                     Oscar Freedman MD  Cardiology Fellow  [phone]  All Cardiology service information can be found 24/7 on [email or web address], password: [email or web address]    Patient seen and evaluated at bedside    Chief Complaint: syncope    HPI: 73M with HTN, HLD, TAA and AI s/p AVR and ascending aorta repair 2018 presenting after a syncopal episode. The patient was found by his wife to be unresponsive while watching TV, slid to the ground. Called EMS, he woke up and was confused and combative, recieved 5mg midazolam en route to the ED. Pt states he had a similar syncopal episode in June where he hit his head, is unaware of what workup was done at that time. He denies any chest pain at this time.     In the ED, CTA with no acute findings.     Recently seen in cardiology clinic on 5/2023, was doing well at that time.    Non-obstructive CAD with 30% RCA lesion at cardiac cath in April 2016 by Dr. DAMI Wilder.     Prior TTE 2021 in the system with EF 59%, AV mean gradient 14, peak 26.      Allergies:  No Known Allergies      Home Meds:    MEDICATIONS  (STANDING):  heparin  Infusion.  Unit(s)/Hr (10 mL/Hr) IV Continuous <Continuous>  simvastatin 20 milliGRAM(s) Oral at bedtime  tamsulosin 0.4 milliGRAM(s) Oral at bedtime    REVIEW OF SYSTEMS:  CONSTITUTIONAL: No weakness, fevers or chills  EYES/ENT: No visual changes;  No dysphagia  NECK: No pain or stiffness  RESPIRATORY: No cough, wheezing, hemoptysis; No shortness of breath  CARDIOVASCULAR: No chest pain or palpitations; No lower extremity edema  GASTROINTESTINAL: No abdominal or epigastric pain. No nausea, vomiting, or hematemesis; No diarrhea or constipation. No melena or hematochezia.  BACK: No back pain  GENITOURINARY: No dysuria, frequency or hematuria  NEUROLOGICAL: No numbness or weakness  SKIN: No itching, burning, rashes, or lesions   All other review of systems is negative unless indicated above.    Physical Exam:  T(F): 97.5 (10-06), Max: 97.5 (10-06)  HR: 67 (10-07) (67 - 71)  BP: 134/84 (10-07) (125/79 - 134/84)  RR: 18 (10-07)  SpO2: 95% (10-07)  GENERAL: No acute distress, well-developed  HEAD:  Atraumatic, Normocephalic  ENT: EOMI, PERRL, conjunctiva and sclera clear, Neck supple, No JVD  CHEST/LUNG: Clear to auscultation bilaterally; No wheeze  HEART: Regular rate and rhythm; No murmur  ABDOMEN: Soft, Nontender, Nondistended  EXTREMITIES:  No edema  PSYCH: Nl behavior, nl affect      CXR: Personally reviewed    Labs: Personally reviewed                        14.9   8.38  )-----------( 186      ( 06 Oct 2023 21:26 )             45.2     10-06    139  |  104  |  20  ----------------------------<  162<H>  3.5   |  20<L>  |  1.07    Ca    9.1      06 Oct 2023 21:26  Mg     2.1     10-06    TPro  7.4  /  Alb  4.5  /  TBili  0.4  /  DBili  x   /  AST  19  /  ALT  21  /  AlkPhos  52  10-06        CARDIAC MARKERS ( 06 Oct 2023 23:46 )  263 ng/L / x     / x     / x     / x     / x      CARDIAC MARKERS ( 06 Oct 2023 21:26 )  21 ng/L / x     / x     / x     / x     / x

## 2023-10-07 NOTE — CONSULT NOTE ADULT - ASSESSMENT
73M with HTN, HLD, TAA and AI s/p AVR and ascending aorta repair 2018 presenting after a syncopal episode, noted to have troponemia 21 -> 263, although no chest pain. Syncope is without prodrome, concerning for possible cardiac etiology (arrythmia, valvular). While troponemia could be in the setting of syncope and brief hypotension, NSTEMI cannot be ruled out.     Recs:  -Monitor on tele  -s/p , ok to continue heparin gtt for now  -trend troponins  -obtain TTE  -hold home antihypertensives for now

## 2023-10-07 NOTE — CONSULT NOTE ADULT - ATTENDING COMMENTS
Mr. Vargas presented with his second episode of syncope without a prodrome. He was found to by his wife to be diaphoretic. The patient denies any chest pain.    ECG with septal Q waves. Troponin T peak 410. TTE shows moderately reduced LVSF with SWMA.    Recommendations:  Continue ASA 81 mg qd  Lipids with next blood draw  Plan for [facility]  Telemetry monitoring  EP evaluation following work-up above    A total of 75 minutes was spent on this patient encounter.

## 2023-10-07 NOTE — H&P ADULT - ASSESSMENT
74yo M w/ PMH of HTN, HLD, TAA and AI s/p AVR and ascending aorta repair in 2018 pw syncopal episode noted to have

## 2023-10-08 DIAGNOSIS — I51.9 HEART DISEASE, UNSPECIFIED: ICD-10-CM

## 2023-10-08 DIAGNOSIS — I50.22 CHRONIC SYSTOLIC (CONGESTIVE) HEART FAILURE: ICD-10-CM

## 2023-10-08 LAB
APTT BLD: 58.3 SEC — HIGH (ref 24.5–35.6)
APTT BLD: 72.8 SEC — HIGH (ref 24.5–35.6)
CHOLEST SERPL-MCNC: 148 MG/DL — SIGNIFICANT CHANGE UP
CK MB BLD-MCNC: 2.1 % — SIGNIFICANT CHANGE UP (ref 0–3.5)
CK MB CFR SERPL CALC: 15.9 NG/ML — HIGH (ref 0–6.7)
CK SERPL-CCNC: 767 U/L — HIGH (ref 30–200)
HCT VFR BLD CALC: 43.6 % — SIGNIFICANT CHANGE UP (ref 39–50)
HCV AB S/CO SERPL IA: 0.21 S/CO — SIGNIFICANT CHANGE UP (ref 0–0.99)
HCV AB SERPL-IMP: SIGNIFICANT CHANGE UP
HDLC SERPL-MCNC: 36 MG/DL — LOW
HGB BLD-MCNC: 14.6 G/DL — SIGNIFICANT CHANGE UP (ref 13–17)
LIPID PNL WITH DIRECT LDL SERPL: 91 MG/DL — SIGNIFICANT CHANGE UP
MCHC RBC-ENTMCNC: 29.5 PG — SIGNIFICANT CHANGE UP (ref 27–34)
MCHC RBC-ENTMCNC: 33.5 GM/DL — SIGNIFICANT CHANGE UP (ref 32–36)
MCV RBC AUTO: 88.1 FL — SIGNIFICANT CHANGE UP (ref 80–100)
NON HDL CHOLESTEROL: 111 MG/DL — SIGNIFICANT CHANGE UP
NRBC # BLD: 0 /100 WBCS — SIGNIFICANT CHANGE UP (ref 0–0)
PLATELET # BLD AUTO: 159 K/UL — SIGNIFICANT CHANGE UP (ref 150–400)
RBC # BLD: 4.95 M/UL — SIGNIFICANT CHANGE UP (ref 4.2–5.8)
RBC # FLD: 13 % — SIGNIFICANT CHANGE UP (ref 10.3–14.5)
TRIGL SERPL-MCNC: 113 MG/DL — SIGNIFICANT CHANGE UP
TROPONIN T, HIGH SENSITIVITY RESULT: 189 NG/L — HIGH (ref 0–51)
WBC # BLD: 7.75 K/UL — SIGNIFICANT CHANGE UP (ref 3.8–10.5)
WBC # FLD AUTO: 7.75 K/UL — SIGNIFICANT CHANGE UP (ref 3.8–10.5)

## 2023-10-08 PROCEDURE — 99233 SBSQ HOSP IP/OBS HIGH 50: CPT | Mod: GC

## 2023-10-08 PROCEDURE — 99232 SBSQ HOSP IP/OBS MODERATE 35: CPT

## 2023-10-08 RX ORDER — ASPIRIN/CALCIUM CARB/MAGNESIUM 324 MG
81 TABLET ORAL DAILY
Refills: 0 | Status: DISCONTINUED | OUTPATIENT
Start: 2023-10-08 | End: 2023-10-10

## 2023-10-08 RX ORDER — ATORVASTATIN CALCIUM 80 MG/1
40 TABLET, FILM COATED ORAL AT BEDTIME
Refills: 0 | Status: DISCONTINUED | OUTPATIENT
Start: 2023-10-08 | End: 2023-10-10

## 2023-10-08 RX ORDER — CHLORHEXIDINE GLUCONATE 213 G/1000ML
1 SOLUTION TOPICAL
Refills: 0 | Status: DISCONTINUED | OUTPATIENT
Start: 2023-10-08 | End: 2023-10-10

## 2023-10-08 RX ADMIN — TAMSULOSIN HYDROCHLORIDE 0.4 MILLIGRAM(S): 0.4 CAPSULE ORAL at 22:45

## 2023-10-08 RX ADMIN — HEPARIN SODIUM 1400 UNIT(S)/HR: 5000 INJECTION INTRAVENOUS; SUBCUTANEOUS at 19:17

## 2023-10-08 RX ADMIN — HEPARIN SODIUM 1400 UNIT(S)/HR: 5000 INJECTION INTRAVENOUS; SUBCUTANEOUS at 01:50

## 2023-10-08 RX ADMIN — Medication 81 MILLIGRAM(S): at 18:19

## 2023-10-08 RX ADMIN — ATORVASTATIN CALCIUM 40 MILLIGRAM(S): 80 TABLET, FILM COATED ORAL at 22:45

## 2023-10-08 RX ADMIN — HEPARIN SODIUM 1400 UNIT(S)/HR: 5000 INJECTION INTRAVENOUS; SUBCUTANEOUS at 22:49

## 2023-10-08 RX ADMIN — HEPARIN SODIUM 1400 UNIT(S)/HR: 5000 INJECTION INTRAVENOUS; SUBCUTANEOUS at 06:22

## 2023-10-08 RX ADMIN — HEPARIN SODIUM 1400 UNIT(S)/HR: 5000 INJECTION INTRAVENOUS; SUBCUTANEOUS at 12:36

## 2023-10-08 RX ADMIN — HEPARIN SODIUM 1400 UNIT(S)/HR: 5000 INJECTION INTRAVENOUS; SUBCUTANEOUS at 07:08

## 2023-10-08 NOTE — PROGRESS NOTE ADULT - SUBJECTIVE AND OBJECTIVE BOX
Patient seen and examined at bedside.    Overnight Events:   - SNEHAL  - on tele NSR     REVIEW OF SYSTEMS:  General: no fatigue/malaise, weight loss/gain.  Skin: no rashes.  Ophthalmologic: no blurred vision, no loss of vision. 	  ENT: no sore throat, rhinorrhea, sinus congestion.  Respiratory: no SOB, cough or wheeze.  CV: No chest pain. No palpitations.   Gastrointestinal:  no N/V/D, no melena/hematemesis/hematochezia.  Genitourinary: no dysuria/hesitancy or hematuria.  Musculoskeletal: no myalgias or arthralgias.  Neurological: no changes in vision or hearing, no lightheadedness/dizziness, no syncope/near syncope	  Psychiatric: no unusual stress/anxiety.   Hematology/Lymphatics: no unusual bleeding, bruising and no lymphadenopathy.  Endocrine: no unusual thirst.           Current Meds:  acetaminophen     Tablet .. 650 milliGRAM(s) Oral every 6 hours PRN  atorvastatin 40 milliGRAM(s) Oral at bedtime  heparin   Injectable 6000 Unit(s) IV Push every 6 hours PRN  heparin  Infusion.  Unit(s)/Hr IV Continuous <Continuous>  influenza  Vaccine (HIGH DOSE) 0.7 milliLiter(s) IntraMuscular once  tamsulosin 0.4 milliGRAM(s) Oral at bedtime      Vitals:  T(F): 98 (10-08), Max: 98.2 (10-07)  HR: 80 (10-08) (56 - 80)  BP: 123/82 (10-08) (123/82 - 150/94)  RR: 18 (10-08)  SpO2: 97% (10-08)  I&O's Summary    07 Oct 2023 07:01  -  08 Oct 2023 07:00  --------------------------------------------------------  IN: 630 mL / OUT: 500 mL / NET: 130 mL        Physical Exam:  GENERAL: No acute distress, well-developed  HEAD:  Atraumatic, Normocephalic  ENT: EOMI, PERRL, conjunctiva and sclera clear, Neck supple, No JVD  CHEST/LUNG: Clear to auscultation bilaterally; No wheeze  HEART: Regular rate and rhythm; No murmur  ABDOMEN: Soft, Nontender, Nondistended  EXTREMITIES:  No edema  PSYCH: Nl behavior, nl affect                          14.6   7.75  )-----------( 159      ( 08 Oct 2023 06:31 )             43.6     10-07    137  |  105  |  17  ----------------------------<  131<H>  4.5   |  26  |  0.93    Ca    8.8      07 Oct 2023 03:51  Phos  2.5     10-07  Mg     2.1     10-07    TPro  7.4  /  Alb  4.5  /  TBili  0.4  /  DBili  x   /  AST  19  /  ALT  21  /  AlkPhos  52  10-06    PT/INR - ( 07 Oct 2023 09:01 )   PT: 12.3 sec;   INR: 1.12 ratio         PTT - ( 08 Oct 2023 05:43 )  PTT:58.3 sec  CARDIAC MARKERS ( 08 Oct 2023 06:30 )  189 ng/L / x     / x     / 767 U/L / x     / 15.9 ng/mL  CARDIAC MARKERS ( 07 Oct 2023 19:37 )  177 ng/L / x     / x     / 875 U/L / x     / 15.1 ng/mL  CARDIAC MARKERS ( 07 Oct 2023 07:16 )  357 ng/L / x     / x     / 765 U/L / x     / 17.0 ng/mL  CARDIAC MARKERS ( 07 Oct 2023 03:51 )  410 ng/L / x     / x     / 499 U/L / x     / 12.3 ng/mL  CARDIAC MARKERS ( 06 Oct 2023 23:46 )  263 ng/L / x     / x     / x     / x     / x                      TRANSTHORACIC ECHOCARDIOGRAM REPORT  ________________________________________________________________________________                                      _______       Pt. Name:       SUDHA KWON Study Date:    10/7/2023  MRN:            [id number]       YOB: 1950  Accession #:    [id number]        Age:           73 years  Account#:       [id number]     Gender:        M  Heart Rate:     61 bpm           Height:        67.00 in (170.18 cm)  Rhythm:         sinus rhythm     Weight:        234.00 lb (106.14 kg)  Blood Pressure: 145/80 mmHg      BSA/BMI:       2.16 m² / 36.65 kg/m²  ________________________________________________________________________________________  Referring Physician:    [id number] Hussain Leroy  Interpreting Physician: Tom Field M.D.  Primary Sonographer:    Shira Reveles [facility]    CPT:               MYOCARDIAL STRAIN IMAGING - 17169.m;ECHO TTE WO CON COMP W                     DOPP - 31885.m;3D RECONST W/O WKSTATION - 79270.m  Indication(s):     Non ST elevation (NSTEMI) myocardial infarction - I21.4  Procedure:         Transthoracic echocardiogram with 2-D, M-mode and complete                     spectral and color flow Doppler. Strain imaging performed for                     evaluation of regional and global myocardial shape and                     dimensions. Real time and full volume 3-dimensional imaging                     performed at the echo machine.  Ordering Location: [facility]  Study Information: Image quality for this study is fair.    _______________________________________________________________________________________     CONCLUSIONS:      1. Left ventricular cavity is normally sized. Left ventricular wall thickness is normal. Left ventricular systolic function is moderately decreased with an ejection fraction of 46 % by 3D.   2. Left ventricular global longitudinal strain is -13.7 % is abnormal (> -16%). Images were acquired on a [employer] ultrasound system and processed on the ultrasound machine with a heart rate of 61 bpm and a blood pressure of 145/80 mmHg. Regional strain pattern is consistent with coronary artery disease/ischemia.   3. Multiple segmental abnormalities exist. See findings.   4. There is mild (grade 1) left ventricular diastolic dysfunction.   5. Normal right ventricular cavity size, wall thickness and systolic function.   6. A bioprosthetic valve replacement present in the aortic position. Trace intravalvular regurgitation No paravalvular regurgitation.   7. Normal atria.   8. No significant valvular disease.   9. No pericardial effusion seen.  10. Mild pulmonic regurgitation.  11. No prior echocardiogram is available for comparison.    ________________________________________________________________________________________   Patient seen and examined at bedside.    Overnight Events:   - SNEHAL  - on tele NSR     REVIEW OF SYSTEMS:  General: no fatigue/malaise, weight loss/gain.  Skin: no rashes.  Ophthalmologic: no blurred vision, no loss of vision. 	  ENT: no sore throat, rhinorrhea, sinus congestion.  Respiratory: no SOB, cough or wheeze.  CV: No chest pain. No palpitations.   Gastrointestinal:  no N/V/D, no melena/hematemesis/hematochezia.  Genitourinary: no dysuria/hesitancy or hematuria.  Musculoskeletal: no myalgias or arthralgias.  Neurological: no changes in vision or hearing, no lightheadedness/dizziness, no syncope/near syncope	  Psychiatric: no unusual stress/anxiety.   Hematology/Lymphatics: no unusual bleeding, bruising and no lymphadenopathy.  Endocrine: no unusual thirst.           Home Medications:  enalapril 5 mg oral tablet: 1 tab(s) orally once a day (07 Oct 2023 02:39)  metoprolol succinate 50 mg oral tablet, extended release: 1 tab(s) orally once a day (07 Oct 2023 02:39)  simvastatin 20 mg oral tablet: 1 tab(s) orally once a day (at bedtime) (07 Oct 2023 02:39)  tamsulosin 0.4 mg oral capsule: 1 cap(s) orally once a day (07 Oct 2023 02:39)    MEDICATIONS  (STANDING):  atorvastatin 40 milliGRAM(s) Oral at bedtime  chlorhexidine 2% Cloths 1 Application(s) Topical <User Schedule>  heparin  Infusion.  Unit(s)/Hr (10 mL/Hr) IV Continuous <Continuous>  influenza  Vaccine (HIGH DOSE) 0.7 milliLiter(s) IntraMuscular once  tamsulosin 0.4 milliGRAM(s) Oral at bedtime    Vitals:  T(F): 98 (10-08), Max: 98.2 (10-07)  HR: 80 (10-08) (56 - 80)  BP: 123/82 (10-08) (123/82 - 150/94)  RR: 18 (10-08)  SpO2: 97% (10-08)  I&O's Summary    07 Oct 2023 07:01  -  08 Oct 2023 07:00  --------------------------------------------------------  IN: 630 mL / OUT: 500 mL / NET: 130 mL        Physical Exam:  GENERAL: No acute distress, well-developed  HEAD:  Atraumatic, Normocephalic  ENT: EOMI, PERRL, conjunctiva and sclera clear, Neck supple, No JVD  CHEST/LUNG: Clear to auscultation bilaterally; No wheeze  HEART: Regular rate and rhythm; No murmur  ABDOMEN: Soft, Nontender, Nondistended  EXTREMITIES:  No edema  PSYCH: Nl behavior, nl affect                          14.6   7.75  )-----------( 159      ( 08 Oct 2023 06:31 )             43.6     10-07    137  |  105  |  17  ----------------------------<  131<H>  4.5   |  26  |  0.93    Ca    8.8      07 Oct 2023 03:51  Phos  2.5     10-07  Mg     2.1     10-07    TPro  7.4  /  Alb  4.5  /  TBili  0.4  /  DBili  x   /  AST  19  /  ALT  21  /  AlkPhos  52  10-06    PT/INR - ( 07 Oct 2023 09:01 )   PT: 12.3 sec;   INR: 1.12 ratio         PTT - ( 08 Oct 2023 05:43 )  PTT:58.3 sec  CARDIAC MARKERS ( 08 Oct 2023 06:30 )  189 ng/L / x     / x     / 767 U/L / x     / 15.9 ng/mL  CARDIAC MARKERS ( 07 Oct 2023 19:37 )  177 ng/L / x     / x     / 875 U/L / x     / 15.1 ng/mL  CARDIAC MARKERS ( 07 Oct 2023 07:16 )  357 ng/L / x     / x     / 765 U/L / x     / 17.0 ng/mL  CARDIAC MARKERS ( 07 Oct 2023 03:51 )  410 ng/L / x     / x     / 499 U/L / x     / 12.3 ng/mL  CARDIAC MARKERS ( 06 Oct 2023 23:46 )  263 ng/L / x     / x     / x     / x     / x                      TRANSTHORACIC ECHOCARDIOGRAM REPORT  ________________________________________________________________________________                                      _______       Pt. Name:       SUDHA KWON Study Date:    10/7/2023  MRN:            [id number]       YOB: 1950  Accession #:    [id number]        Age:           73 years  Account#:       [id number]     Gender:        M  Heart Rate:     61 bpm           Height:        67.00 in (170.18 cm)  Rhythm:         sinus rhythm     Weight:        234.00 lb (106.14 kg)  Blood Pressure: 145/80 mmHg      BSA/BMI:       2.16 m² / 36.65 kg/m²  ________________________________________________________________________________________  Referring Physician:    [id number] Hussain Leroy  Interpreting Physician: Tom Field M.D.  Primary Sonographer:    [facility]    CPT:               MYOCARDIAL STRAIN IMAGING - 72330.m;ECHO TTE WO CON COMP W                     DOPP - 11943.m;3D RECONST W/O WKSTATION - 86528.m  Indication(s):     Non ST elevation (NSTEMI) myocardial infarction - I21.4  Procedure:         Transthoracic echocardiogram with 2-D, M-mode and complete                     spectral and color flow Doppler. Strain imaging performed for                     evaluation of regional and global myocardial shape and                     dimensions. Real time and full volume 3-dimensional imaging                     performed at the echo machine.  Ordering Location: [facility]  Study Information: Image quality for this study is fair.    _______________________________________________________________________________________     CONCLUSIONS:      1. Left ventricular cavity is normally sized. Left ventricular wall thickness is normal. Left ventricular systolic function is moderately decreased with an ejection fraction of 46 % by 3D.   2. Left ventricular global longitudinal strain is -13.7 % is abnormal (> -16%). Images were acquired on a [employer] ultrasound system and processed on the ultrasound machine with a heart rate of 61 bpm and a blood pressure of 145/80 mmHg. Regional strain pattern is consistent with coronary artery disease/ischemia.   3. Multiple segmental abnormalities exist. See findings.   4. There is mild (grade 1) left ventricular diastolic dysfunction.   5. Normal right ventricular cavity size, wall thickness and systolic function.   6. A bioprosthetic valve replacement present in the aortic position. Trace intravalvular regurgitation No paravalvular regurgitation.   7. Normal atria.   8. No significant valvular disease.   9. No pericardial effusion seen.  10. Mild pulmonic regurgitation.  11. No prior echocardiogram is available for comparison.    ________________________________________________________________________________________

## 2023-10-08 NOTE — PROGRESS NOTE ADULT - ASSESSMENT
74yo M w/ PMH of HTN, HLD, TAA and AI s/p AVR and ascending aorta repair in 2018 pw syncopal episode.  Pt w/ syncopal episode while watching TV this AM w/ reported unresponsiveness for a couple of minutes prompting call to EMS. Upon EMS's arrival, pt was awake, confused, and combative s/p Midazolam 5mg in ED. Patient with elevated Troponin seen by Cardiology overnight NSTEMI unable to be ruled out. Troponins peaked, patient to have syncope workup, LHC and afterward EP evaluation.

## 2023-10-08 NOTE — PROGRESS NOTE ADULT - SUBJECTIVE AND OBJECTIVE BOX
[facility] Division of Hospital Medicine  Mina Lee DO  Pager (M-F, 8A-5P):  MS Teams PREFERRED        SUBJECTIVE / OVERNIGHT EVENTS:  Pt seen at the bedside accompanied by wife. States that he is in no acute distress, has not felt dizziness while he has been here. Denies chest pain, nausea, vomiting, abdominal pain.     MEDICATIONS  (STANDING):  atorvastatin 40 milliGRAM(s) Oral at bedtime  chlorhexidine 2% Cloths 1 Application(s) Topical <User Schedule>  heparin  Infusion.  Unit(s)/Hr (10 mL/Hr) IV Continuous <Continuous>  influenza  Vaccine (HIGH DOSE) 0.7 milliLiter(s) IntraMuscular once  tamsulosin 0.4 milliGRAM(s) Oral at bedtime    MEDICATIONS  (PRN):  acetaminophen     Tablet .. 650 milliGRAM(s) Oral every 6 hours PRN Temp greater or equal to 38C (100.4F), Mild Pain (1 - 3)  heparin   Injectable 6000 Unit(s) IV Push every 6 hours PRN For aPTT less than 40      I&O's Summary    07 Oct 2023 07:01  -  08 Oct 2023 07:00  --------------------------------------------------------  IN: 630 mL / OUT: 500 mL / NET: 130 mL    08 Oct 2023 07:01  -  08 Oct 2023 16:48  --------------------------------------------------------  IN: 480 mL / OUT: 0 mL / NET: 480 mL        PHYSICAL EXAM:  Vital Signs Last 24 Hrs  T(C): 36.9 (08 Oct 2023 16:00), Max: 36.9 (08 Oct 2023 16:00)  T(F): 98.5 (08 Oct 2023 16:00), Max: 98.5 (08 Oct 2023 16:00)  HR: 73 (08 Oct 2023 16:00) (56 - 83)  BP: 159/89 (08 Oct 2023 16:00) (123/82 - 159/89)  BP(mean): --  RR: 18 (08 Oct 2023 16:00) (18 - 18)  SpO2: 97% (08 Oct 2023 16:00) (96% - 97%)    Parameters below as of 08 Oct 2023 16:00  Patient On (Oxygen Delivery Method): room air      CONSTITUTIONAL: No fever, weight loss  EYES: No eye pain, visual disturbances, or discharge  ENMT:  No difficulty hearing, tinnitus, vertigo; No sinus or throat pain  RESPIRATORY: No SOB. No cough, wheezing, chills or hemoptysis  CARDIOVASCULAR: No chest pain, palpitations, dizziness, or leg swelling  GASTROINTESTINAL: No abdominal or epigastric pain. No nausea, vomiting, or hematemesis; No diarrhea or constipation. No melena or hematochezia.  GENITOURINARY: No dysuria, frequency, hematuria, or incontinence  NEUROLOGICAL: No headaches, memory loss, loss of strength, numbness, or tremors  SKIN: No itching, burning, rashes, or lesions   LYMPH NODES: No enlarged glands  ENDOCRINE: No heat or cold intolerance; No hair loss  MUSCULOSKELETAL: No joint pain or swelling; No muscle, back pain  PSYCHIATRIC: No depression, anxiety, mood swings, or difficulty sleeping  HEME/LYMPH: No easy bruising, or bleeding gums    LABS:                        14.6   7.75  )-----------( 159      ( 08 Oct 2023 06:31 )             43.6     10-07    137  |  105  |  17  ----------------------------<  131<H>  4.5   |  26  |  0.93    Ca    8.8      07 Oct 2023 03:51  Phos  2.5     10-  Mg     2.1     10-    TPro  7.4  /  Alb  4.5  /  TBili  0.4  /  DBili  x   /  AST  19  /  ALT  21  /  AlkPhos  52  10-06    PT/INR - ( 07 Oct 2023 09:01 )   PT: 12.3 sec;   INR: 1.12 ratio         PTT - ( 08 Oct 2023 12:15 )  PTT:72.8 sec  CARDIAC MARKERS ( 08 Oct 2023 06:30 )  x     / x     / 767 U/L / x     / 15.9 ng/mL  CARDIAC MARKERS ( 07 Oct 2023 19:37 )  x     / x     / 875 U/L / x     / 15.1 ng/mL  CARDIAC MARKERS ( 07 Oct 2023 07:16 )  x     / x     / 765 U/L / x     / 17.0 ng/mL  CARDIAC MARKERS ( 07 Oct 2023 03:51 )  x     / x     / 499 U/L / x     / 12.3 ng/mL      Urinalysis Basic - ( 07 Oct 2023 06:15 )    Color: Light Yellow / Appearance: Clear / S.039 / pH: x  Gluc: x / Ketone: Negative  / Bili: Negative / Urobili: Negative   Blood: x / Protein: 30 mg/dL / Nitrite: Negative   Leuk Esterase: Negative / RBC: 1 /hpf / WBC 2 /HPF   Sq Epi: x / Non Sq Epi: x / Bacteria: Negative          RADIOLOGY & ADDITIONAL TESTS:  Results Reviewed: CBC/CMP personally reviewed by me Hgb 14.6 WBC 7.75 Cr 0.93  Imaging Personally Reviewed:  Electrocardiogram Personally Reviewed:    COORDINATION OF CARE:  Care Discussed with Consultants/Other Providers [Y/N]: Y   Prior or Outpatient Records Reviewed [Y/N]: Y    [facility] Division of Hospital Medicine  Mina Lee DO  Pager (M-F, 8A-5P):  MS Teams PREFERRED        SUBJECTIVE / OVERNIGHT EVENTS:  Pt seen at the bedside accompanied by wife. States that he is in no acute distress, has not felt dizziness while he has been here. Denies chest pain, nausea, vomiting, abdominal pain.     MEDICATIONS  (STANDING):  atorvastatin 40 milliGRAM(s) Oral at bedtime  chlorhexidine 2% Cloths 1 Application(s) Topical <User Schedule>  heparin  Infusion.  Unit(s)/Hr (10 mL/Hr) IV Continuous <Continuous>  influenza  Vaccine (HIGH DOSE) 0.7 milliLiter(s) IntraMuscular once  tamsulosin 0.4 milliGRAM(s) Oral at bedtime    MEDICATIONS  (PRN):  acetaminophen     Tablet .. 650 milliGRAM(s) Oral every 6 hours PRN Temp greater or equal to 38C (100.4F), Mild Pain (1 - 3)  heparin   Injectable 6000 Unit(s) IV Push every 6 hours PRN For aPTT less than 40      I&O's Summary    07 Oct 2023 07:01  -  08 Oct 2023 07:00  --------------------------------------------------------  IN: 630 mL / OUT: 500 mL / NET: 130 mL    08 Oct 2023 07:01  -  08 Oct 2023 16:48  --------------------------------------------------------  IN: 480 mL / OUT: 0 mL / NET: 480 mL        PHYSICAL EXAM:  Vital Signs Last 24 Hrs  T(C): 36.9 (08 Oct 2023 16:00), Max: 36.9 (08 Oct 2023 16:00)  T(F): 98.5 (08 Oct 2023 16:00), Max: 98.5 (08 Oct 2023 16:00)  HR: 73 (08 Oct 2023 16:00) (56 - 83)  BP: 159/89 (08 Oct 2023 16:00) (123/82 - 159/89)  BP(mean): --  RR: 18 (08 Oct 2023 16:00) (18 - 18)  SpO2: 97% (08 Oct 2023 16:00) (96% - 97%)    Parameters below as of 08 Oct 2023 16:00  Patient On (Oxygen Delivery Method): room air      CCONSTITUTIONAL: Well-groomed, in no apparent distress  EYES: No conjunctival or scleral injection, non-icteric;   ENMT: No external nasal lesions; MMM  NECK: Trachea midline without palpable neck mass; thyroid not enlarged and non-tender  RESPIRATORY: Breathing comfortably; no dullness to percussion; lungs CTA without wheeze/rhonchi/rales  CARDIOVASCULAR: +S1S2, RRR, no M/G/R; pedal pulses full and symmetric; no lower extremity edema  GASTROINTESTINAL: No palpable masses or tenderness, +BS throughout, no rebound/guarding; no hepatosplenomegaly; no hernia palpated  LYMPHATIC: No cervical LAD or tenderness  SKIN: No rashes or ulcers noted  NEUROLOGIC: CN II-XII intact; sensation intact in LEs b/l to light touch  PSYCHIATRIC: A&Ox3; mood and affect appropriate; appropriate insight and judgment    LABS:                        14.6   7.75  )-----------( 159      ( 08 Oct 2023 06:31 )             43.6     10-07    137  |  105  |  17  ----------------------------<  131<H>  4.5   |  26  |  0.93    Ca    8.8      07 Oct 2023 03:51  Phos  2.5     10-07  Mg     2.1     10-07    TPro  7.4  /  Alb  4.5  /  TBili  0.4  /  DBili  x   /  AST  19  /  ALT  21  /  AlkPhos  52  10-06    PT/INR - ( 07 Oct 2023 09:01 )   PT: 12.3 sec;   INR: 1.12 ratio         PTT - ( 08 Oct 2023 12:15 )  PTT:72.8 sec  CARDIAC MARKERS ( 08 Oct 2023 06:30 )  x     / x     / 767 U/L / x     / 15.9 ng/mL  CARDIAC MARKERS ( 07 Oct 2023 19:37 )  x     / x     / 875 U/L / x     / 15.1 ng/mL  CARDIAC MARKERS ( 07 Oct 2023 07:16 )  x     / x     / 765 U/L / x     / 17.0 ng/mL  CARDIAC MARKERS ( 07 Oct 2023 03:51 )  x     / x     / 499 U/L / x     / 12.3 ng/mL      Urinalysis Basic - ( 07 Oct 2023 06:15 )    Color: Light Yellow / Appearance: Clear / S.039 / pH: x  Gluc: x / Ketone: Negative  / Bili: Negative / Urobili: Negative   Blood: x / Protein: 30 mg/dL / Nitrite: Negative   Leuk Esterase: Negative / RBC: 1 /hpf / WBC 2 /HPF   Sq Epi: x / Non Sq Epi: x / Bacteria: Negative          RADIOLOGY & ADDITIONAL TESTS:  Results Reviewed: CBC/CMP personally reviewed by me Hgb 14.6 WBC 7.75 Cr 0.93  Imaging Personally Reviewed:  Electrocardiogram Personally Reviewed:    COORDINATION OF CARE:  Care Discussed with Consultants/Other Providers [Y/N]: Y   Prior or Outpatient Records Reviewed [Y/N]: Y

## 2023-10-08 NOTE — PROGRESS NOTE ADULT - ASSESSMENT
73M with HTN, HLD, TAA and AI s/p AVR and ascending aorta repair 2018 presenting after a syncopal episode, noted to have troponemia 21 -> 263, although no chest pain. Syncope is without prodrome, concerning for possible cardiac etiology (arrythmia, valvular). While troponemia could be in the setting of syncope and brief hypotension, NSTEMI cannot be ruled out.     1. NSTEMI  2. Syncope  3. LV Dysfunction - LVEF 46%    Recs:  - Trend cardiac enzymes  - Continue ASA 81mg daily  - Continue Heparin gtt (48 hours)  - Check lipid profile would switch from simvastatin to atorvastatin 40mg daily  - Plan for LHC if ischemic w/u negative will consider EP evaluation       Note not final until signed by attending       Yue Caro MD  Cardiology Fellow PGY-6      For all New Consults  [email or web address]   Login: [email or web address]     73M with HTN, HLD, TAA and AI s/p AVR and ascending aorta repair 2018 presenting after a syncopal episode, noted to have troponemia 21 -> 263, although no chest pain. Syncope is without prodrome, concerning for possible cardiac etiology (arrythmia, valvular). While troponemia could be in the setting of syncope and brief hypotension, NSTEMI cannot be ruled out.     1. NSTEMI  2. Syncope  3. LV Dysfunction - LVEF 46%    Recs:  - Trend cardiac enzymes  - Continue ASA 81mg daily  - Continue Heparin gtt (48 hours)  - Check lipid profile would switch from simvastatin to atorvastatin 40mg daily  - Plan for LHC   - EP consult post LHC        Note not final until signed by attending       Yue Caro MD  Cardiology Fellow PGY-6      For all New Consults  [email or web address]   Login: [email or web address]

## 2023-10-08 NOTE — PROGRESS NOTE ADULT - NSPROGADDITIONALINFOA_GEN_ALL_CORE
Discussed plan of care with patient's wife Maria Esther Vargas at the bedside, all questions answered.

## 2023-10-09 LAB
APTT BLD: 30.2 SEC — SIGNIFICANT CHANGE UP (ref 24.5–35.6)
APTT BLD: 32.7 SEC — SIGNIFICANT CHANGE UP (ref 24.5–35.6)
HCT VFR BLD CALC: 44.1 % — SIGNIFICANT CHANGE UP (ref 39–50)
HGB BLD-MCNC: 15.1 G/DL — SIGNIFICANT CHANGE UP (ref 13–17)
MCHC RBC-ENTMCNC: 30 PG — SIGNIFICANT CHANGE UP (ref 27–34)
MCHC RBC-ENTMCNC: 34.2 GM/DL — SIGNIFICANT CHANGE UP (ref 32–36)
MCV RBC AUTO: 87.5 FL — SIGNIFICANT CHANGE UP (ref 80–100)
MRSA PCR RESULT.: SIGNIFICANT CHANGE UP
NRBC # BLD: 0 /100 WBCS — SIGNIFICANT CHANGE UP (ref 0–0)
PLATELET # BLD AUTO: 168 K/UL — SIGNIFICANT CHANGE UP (ref 150–400)
RBC # BLD: 5.04 M/UL — SIGNIFICANT CHANGE UP (ref 4.2–5.8)
RBC # FLD: 12.9 % — SIGNIFICANT CHANGE UP (ref 10.3–14.5)
S AUREUS DNA NOSE QL NAA+PROBE: SIGNIFICANT CHANGE UP
WBC # BLD: 8.22 K/UL — SIGNIFICANT CHANGE UP (ref 3.8–10.5)
WBC # FLD AUTO: 8.22 K/UL — SIGNIFICANT CHANGE UP (ref 3.8–10.5)

## 2023-10-09 PROCEDURE — 99233 SBSQ HOSP IP/OBS HIGH 50: CPT

## 2023-10-09 PROCEDURE — 99232 SBSQ HOSP IP/OBS MODERATE 35: CPT

## 2023-10-09 RX ORDER — SODIUM CHLORIDE 9 MG/ML
1000 INJECTION INTRAMUSCULAR; INTRAVENOUS; SUBCUTANEOUS
Refills: 0 | Status: DISCONTINUED | OUTPATIENT
Start: 2023-10-09 | End: 2023-10-10

## 2023-10-09 RX ORDER — CARVEDILOL PHOSPHATE 80 MG/1
6.25 CAPSULE, EXTENDED RELEASE ORAL EVERY 12 HOURS
Refills: 0 | Status: DISCONTINUED | OUTPATIENT
Start: 2023-10-09 | End: 2023-10-10

## 2023-10-09 RX ORDER — SODIUM CHLORIDE 9 MG/ML
250 INJECTION INTRAMUSCULAR; INTRAVENOUS; SUBCUTANEOUS ONCE
Refills: 0 | Status: COMPLETED | OUTPATIENT
Start: 2023-10-09 | End: 2023-10-09

## 2023-10-09 RX ADMIN — SODIUM CHLORIDE 500 MILLILITER(S): 9 INJECTION INTRAMUSCULAR; INTRAVENOUS; SUBCUTANEOUS at 09:40

## 2023-10-09 RX ADMIN — Medication 81 MILLIGRAM(S): at 15:22

## 2023-10-09 RX ADMIN — HEPARIN SODIUM 1700 UNIT(S)/HR: 5000 INJECTION INTRAVENOUS; SUBCUTANEOUS at 07:59

## 2023-10-09 RX ADMIN — CARVEDILOL PHOSPHATE 6.25 MILLIGRAM(S): 80 CAPSULE, EXTENDED RELEASE ORAL at 17:12

## 2023-10-09 RX ADMIN — CHLORHEXIDINE GLUCONATE 1 APPLICATION(S): 213 SOLUTION TOPICAL at 05:03

## 2023-10-09 RX ADMIN — HEPARIN SODIUM 1700 UNIT(S)/HR: 5000 INJECTION INTRAVENOUS; SUBCUTANEOUS at 08:02

## 2023-10-09 RX ADMIN — HEPARIN SODIUM 1400 UNIT(S)/HR: 5000 INJECTION INTRAVENOUS; SUBCUTANEOUS at 07:25

## 2023-10-09 RX ADMIN — TAMSULOSIN HYDROCHLORIDE 0.4 MILLIGRAM(S): 0.4 CAPSULE ORAL at 21:13

## 2023-10-09 RX ADMIN — SODIUM CHLORIDE 75 MILLILITER(S): 9 INJECTION INTRAMUSCULAR; INTRAVENOUS; SUBCUTANEOUS at 09:40

## 2023-10-09 RX ADMIN — HEPARIN SODIUM 6000 UNIT(S): 5000 INJECTION INTRAVENOUS; SUBCUTANEOUS at 08:39

## 2023-10-09 RX ADMIN — ATORVASTATIN CALCIUM 40 MILLIGRAM(S): 80 TABLET, FILM COATED ORAL at 21:13

## 2023-10-09 NOTE — CHART NOTE - NSCHARTNOTEFT_GEN_A_CORE
Pt s/p LHC via RRA; no intervention luminal irregularities. Site benign no bleeding, hematoma or erythema.     Diana Clement, NP

## 2023-10-09 NOTE — PROGRESS NOTE ADULT - SUBJECTIVE AND OBJECTIVE BOX
[facility] Division of Hospital Medicine  Mina Lee DO  Pager (M-F, 8A-5P):  MS Teams PREFERRED        SUBJECTIVE / OVERNIGHT EVENTS:  Patient seen at bedside, reports to go for cath soon. No acute complaints overnight.       MEDICATIONS  (STANDING):  aspirin enteric coated 81 milliGRAM(s) Oral daily  atorvastatin 40 milliGRAM(s) Oral at bedtime  carvedilol 6.25 milliGRAM(s) Oral every 12 hours  chlorhexidine 2% Cloths 1 Application(s) Topical <User Schedule>  influenza  Vaccine (HIGH DOSE) 0.7 milliLiter(s) IntraMuscular once  sodium chloride 0.9%. 1000 milliLiter(s) (75 mL/Hr) IV Continuous <Continuous>  tamsulosin 0.4 milliGRAM(s) Oral at bedtime    MEDICATIONS  (PRN):  acetaminophen     Tablet .. 650 milliGRAM(s) Oral every 6 hours PRN Temp greater or equal to 38C (100.4F), Mild Pain (1 - 3)      I&O's Summary    08 Oct 2023 07:01  -  09 Oct 2023 07:00  --------------------------------------------------------  IN: 480 mL / OUT: 300 mL / NET: 180 mL        PHYSICAL EXAM:  Vital Signs Last 24 Hrs  T(C): 36.9 (09 Oct 2023 14:59), Max: 36.9 (09 Oct 2023 09:14)  T(F): 98.5 (09 Oct 2023 14:59), Max: 98.5 (09 Oct 2023 09:14)  HR: 74 (09 Oct 2023 14:59) (65 - 98)  BP: 159/80 (09 Oct 2023 14:59) (132/78 - 168/66)  BP(mean): --  RR: 19 (09 Oct 2023 14:59) (16 - 19)  SpO2: 98% (09 Oct 2023 14:59) (93% - 98%)    Parameters below as of 09 Oct 2023 14:59  Patient On (Oxygen Delivery Method): room air      CONSTITUTIONAL: No fever, weight loss  EYES: No eye pain, visual disturbances, or discharge  ENMT:  No difficulty hearing, tinnitus, vertigo; No sinus or throat pain  RESPIRATORY: No SOB. No cough, wheezing, chills or hemoptysis  CARDIOVASCULAR: No chest pain, palpitations, dizziness, or leg swelling  GASTROINTESTINAL: No abdominal or epigastric pain. No nausea, vomiting, or hematemesis; No diarrhea or constipation. No melena or hematochezia.  GENITOURINARY: No dysuria, frequency, hematuria, or incontinence  NEUROLOGICAL: No headaches, memory loss, loss of strength, numbness, or tremors  SKIN: No itching, burning, rashes, or lesions   LYMPH NODES: No enlarged glands  ENDOCRINE: No heat or cold intolerance; No hair loss  MUSCULOSKELETAL: No joint pain or swelling; No muscle, back pain  PSYCHIATRIC: No depression, anxiety, mood swings, or difficulty sleeping  HEME/LYMPH: No easy bruising, or bleeding gums    LABS:                        15.1   8.22  )-----------( 168      ( 09 Oct 2023 06:33 )             44.1           PTT - ( 09 Oct 2023 15:43 )  PTT:32.7 sec  CARDIAC MARKERS ( 08 Oct 2023 06:30 )  x     / x     / 767 U/L / x     / 15.9 ng/mL  CARDIAC MARKERS ( 07 Oct 2023 19:37 )  x     / x     / 875 U/L / x     / 15.1 ng/mL            RADIOLOGY & ADDITIONAL TESTS:  Results Reviewed: CBC personally reviewed by me Hgb 15.1, WBC 8.22  Imaging Personally Reviewed:  Electrocardiogram Personally Reviewed:    COORDINATION OF CARE:  Care Discussed with Consultants/Other Providers [Y/N]:  Prior or Outpatient Records Reviewed [Y/N]:   [facility] Division of Hospital Medicine  Mina Lee DO  Pager (M-F, 8A-5P):  MS Teams PREFERRED        SUBJECTIVE / OVERNIGHT EVENTS:  Patient seen at bedside, reports to go for cath soon. No acute complaints overnight.       MEDICATIONS  (STANDING):  aspirin enteric coated 81 milliGRAM(s) Oral daily  atorvastatin 40 milliGRAM(s) Oral at bedtime  carvedilol 6.25 milliGRAM(s) Oral every 12 hours  chlorhexidine 2% Cloths 1 Application(s) Topical <User Schedule>  influenza  Vaccine (HIGH DOSE) 0.7 milliLiter(s) IntraMuscular once  sodium chloride 0.9%. 1000 milliLiter(s) (75 mL/Hr) IV Continuous <Continuous>  tamsulosin 0.4 milliGRAM(s) Oral at bedtime    MEDICATIONS  (PRN):  acetaminophen     Tablet .. 650 milliGRAM(s) Oral every 6 hours PRN Temp greater or equal to 38C (100.4F), Mild Pain (1 - 3)      I&O's Summary    08 Oct 2023 07:01  -  09 Oct 2023 07:00  --------------------------------------------------------  IN: 480 mL / OUT: 300 mL / NET: 180 mL        PHYSICAL EXAM:  Vital Signs Last 24 Hrs  T(C): 36.9 (09 Oct 2023 14:59), Max: 36.9 (09 Oct 2023 09:14)  T(F): 98.5 (09 Oct 2023 14:59), Max: 98.5 (09 Oct 2023 09:14)  HR: 74 (09 Oct 2023 14:59) (65 - 98)  BP: 159/80 (09 Oct 2023 14:59) (132/78 - 168/66)  BP(mean): --  RR: 19 (09 Oct 2023 14:59) (16 - 19)  SpO2: 98% (09 Oct 2023 14:59) (93% - 98%)    Parameters below as of 09 Oct 2023 14:59  Patient On (Oxygen Delivery Method): room air      CONSTITUTIONAL: Well-groomed, in no apparent distress  EYES: No conjunctival or scleral injection, non-icteric;   ENMT: No external nasal lesions; MMM  NECK: Trachea midline without palpable neck mass; thyroid not enlarged and non-tender  RESPIRATORY: Breathing comfortably; no dullness to percussion; lungs CTA without wheeze/rhonchi/rales  CARDIOVASCULAR: +S1S2, RRR, no M/G/R; pedal pulses full and symmetric; no lower extremity edema  GASTROINTESTINAL: No palpable masses or tenderness, +BS throughout, no rebound/guarding; no hepatosplenomegaly; no hernia palpated  LYMPHATIC: No cervical LAD or tenderness  SKIN: No rashes or ulcers noted  NEUROLOGIC: CN II-XII intact; sensation intact in LEs b/l to light touch  PSYCHIATRIC: A&Ox3; mood and affect appropriate; appropriate insight and judgment  LABS:                        15.1   8.22  )-----------( 168      ( 09 Oct 2023 06:33 )             44.1           PTT - ( 09 Oct 2023 15:43 )  PTT:32.7 sec  CARDIAC MARKERS ( 08 Oct 2023 06:30 )  x     / x     / 767 U/L / x     / 15.9 ng/mL  CARDIAC MARKERS ( 07 Oct 2023 19:37 )  x     / x     / 875 U/L / x     / 15.1 ng/mL            RADIOLOGY & ADDITIONAL TESTS:  Results Reviewed: CBC personally reviewed by me Hgb 15.1, WBC 8.22  Imaging Personally Reviewed:  Electrocardiogram Personally Reviewed:    COORDINATION OF CARE:  Care Discussed with Consultants/Other Providers [Y/N]:  Prior or Outpatient Records Reviewed [Y/N]:

## 2023-10-09 NOTE — PROGRESS NOTE ADULT - PROBLEM SELECTOR PLAN 3
- s/p repair 2018  - Holding home Toprol 50mg qd for now, per cards.
- s/p repair 2018  - Holding home Toprol 50mg qd for now, per cards

## 2023-10-09 NOTE — PROGRESS NOTE ADULT - SUBJECTIVE AND OBJECTIVE BOX
Cardiology Progress Note  ------------------------------------------------------------------------------------------    SUBJECTIVE/EVENTS::  - Tele: no events  - NAEO    -------------------------------------------------------------------------------------------  ROS:  CV: chest pain (-), palpitation (-), orthopnea (-), PND (-), edema (-)  PULM: SOB (-), cough (-), wheezing (-), hemoptysis (-).   CONST: fever (-), chills (-) or fatigue (-)  GI: abdominal distension (-), abdominal pain (-) , nausea/vomiting (-), hematemesis, (-), melena (-), hematochezia (-)  : dysuria (-), frequency (-), hematuria (-).   NEURO: numbness (-), weakness (-), dizziness (-)  MSK: myalgia (-), joint pain (-)   SKIN: itching (-), rash (-)  HEENT:  visual changes (-); vertigo or throat pain (-);  neck stiffness (-)   Psych: change in mood (-), anxiety (-), depression (-)     All other review of systems is negative unless indicated above.   -------------------------------------------------------------------------------------------  VS:  T(F): 98 (10-09), Max: 98.5 (10-08)  HR: 66 (10-09) (66 - 83)  BP: 159/90 (10-09) (132/78 - 160/80)  RR: 17 (10-09)  SpO2: 93% (10-09)  I&O's Summary    08 Oct 2023 07:01  -  09 Oct 2023 07:00  --------------------------------------------------------  IN: 480 mL / OUT: 300 mL / NET: 180 mL      ------------------------------------------------------------------------------------------  PHYSICAL EXAM:  GEN: NAD, sitting in bed  HEENT: NCAT, EOMI  CV: RRR, Ns1/s2, no m/r/g, JVP not elevated  RESP: CTA B/l, no w/r/r  ABD: soft, nt, nd  Ext: warm, 2+ pulses, no edema  Neuro: AAOx3, no focal deficits    -------------------------------------------------------------------------------------------  LABS:                          15.1   8.22  )-----------( 168      ( 09 Oct 2023 06:33 )             44.1           PT/INR - ( 07 Oct 2023 09:01 )   PT: 12.3 sec;   INR: 1.12 ratio         PTT - ( 09 Oct 2023 06:33 )  PTT:30.2 sec  CARDIAC MARKERS ( 08 Oct 2023 06:30 )  189 ng/L / x     / x     / 767 U/L / x     / 15.9 ng/mL  CARDIAC MARKERS ( 07 Oct 2023 19:37 )  177 ng/L / x     / x     / 875 U/L / x     / 15.1 ng/mL  CARDIAC MARKERS ( 07 Oct 2023 07:16 )  357 ng/L / x     / x     / 765 U/L / x     / 17.0 ng/mL  CARDIAC MARKERS ( 07 Oct 2023 03:51 )  410 ng/L / x     / x     / 499 U/L / x     / 12.3 ng/mL  CARDIAC MARKERS ( 06 Oct 2023 23:46 )  263 ng/L / x     / x     / x     / x     / x          Total Cholesterol: 148  LDL: --  HDL: 36  T        -------------------------------------------------------------------------------------------  Meds:  acetaminophen     Tablet .. 650 milliGRAM(s) Oral every 6 hours PRN  aspirin enteric coated 81 milliGRAM(s) Oral daily  atorvastatin 40 milliGRAM(s) Oral at bedtime  chlorhexidine 2% Cloths 1 Application(s) Topical <User Schedule>  heparin   Injectable 6000 Unit(s) IV Push every 6 hours PRN  heparin  Infusion.  Unit(s)/Hr IV Continuous <Continuous>  influenza  Vaccine (HIGH DOSE) 0.7 milliLiter(s) IntraMuscular once  tamsulosin 0.4 milliGRAM(s) Oral at bedtime    -------------------------------------------------------------------------------------------  Cardiovascular Diagnostic Testing:  TTE 10/7:  CONCLUSIONS:      1. Left ventricular cavity is normally sized. Left ventricular wall thickness is normal. Left ventricular systolic function is moderately decreased with an ejection fraction of 46 % by 3D.   2. Left ventricular global longitudinal strain is -13.7 % is abnormal (> -16%). Images were acquired on a [employer] ultrasound system and processed on the ultrasound machine with a heart rate of 61 bpm and a blood pressure of 145/80 mmHg. Regional strain pattern is consistent with coronary artery disease/ischemia.   3. Multiple segmental abnormalities exist. See findings.   4. There is mild (grade 1) left ventricular diastolic dysfunction.   5. Normal right ventricular cavity size, wall thickness and systolic function.   6. A bioprosthetic valve replacement present in the aortic position. Trace intravalvular regurgitation No paravalvular regurgitation.   7. Normal atria.   8. No significant valvular disease.   9. No pericardial effusion seen.  10. Mild pulmonic regurgitation.  11. No prior echocardiogram is available for comparison.    -------------------------------------------------------------------------------------------  Assessment and Plan:   73M with HTN, HLD, nonobstructive CAD from 2016 (30% RCA) TAA and AI s/p AVR and ascending aorta repair 2018 presenting after a syncopal episode, noted to have troponemia, although no chest pain. Syncope is without prodrome, concerning for possible cardiac etiology (arrythmia, valvular). While troponemia could be in the setting of syncope and brief hypotension, NSTEMI cannot be ruled out.     1. NSTEMI - no CP but trop found to be elevated iso syncopal episode - Trop peaked at 357 and downtrending. EKG w/ septal Q waves. TTE w/ EF 45% w/ WMA. Prior LHC w/ 30% RCA lesion from 2016  2. Syncope - reports syncope w/o prodrome, occurred while watching TV and woke up mildly confused.  3. LV Dysfunction - LVEF 46%    Recommendations:  - plan for [facility] today  - Continue ASA 81mg daily, atorva  - Continue Heparin gtt (48 hours) or until PCI  - EP consult post LHC    *** Recommendations are preliminary until cosigned by the attending.    Bruce Muhammad MD  Cardiology Fellow    For all New Consults and Questions:  [email or web address]   Login: [email or web address] Cardiology Progress Note  ------------------------------------------------------------------------------------------    SUBJECTIVE/EVENTS::  - Tele: no events  - NAEO    -------------------------------------------------------------------------------------------  ROS:  CV: chest pain (-), palpitation (-), orthopnea (-), PND (-), edema (-)  PULM: SOB (-), cough (-), wheezing (-), hemoptysis (-).   CONST: fever (-), chills (-) or fatigue (-)  GI: abdominal distension (-), abdominal pain (-) , nausea/vomiting (-), hematemesis, (-), melena (-), hematochezia (-)  : dysuria (-), frequency (-), hematuria (-).   NEURO: numbness (-), weakness (-), dizziness (-)  MSK: myalgia (-), joint pain (-)   SKIN: itching (-), rash (-)  HEENT:  visual changes (-); vertigo or throat pain (-);  neck stiffness (-)   Psych: change in mood (-), anxiety (-), depression (-)     All other review of systems is negative unless indicated above.   -------------------------------------------------------------------------------------------  VS:  T(F): 98 (10-09), Max: 98.5 (10-08)  HR: 66 (10-09) (66 - 83)  BP: 159/90 (10-09) (132/78 - 160/80)  RR: 17 (10-09)  SpO2: 93% (10-09)  I&O's Summary    08 Oct 2023 07:01  -  09 Oct 2023 07:00  --------------------------------------------------------  IN: 480 mL / OUT: 300 mL / NET: 180 mL      ------------------------------------------------------------------------------------------  PHYSICAL EXAM:  GEN: NAD, sitting in bed  HEENT: NCAT, EOMI  CV: RRR, Ns1/s2, no m/r/g, JVP not elevated  RESP: CTA B/l, no w/r/r  ABD: soft, nt, nd  Ext: warm, 2+ pulses, no edema  Neuro: AAOx3, no focal deficits    -------------------------------------------------------------------------------------------  LABS:                          15.1   8.22  )-----------( 168      ( 09 Oct 2023 06:33 )             44.1           PT/INR - ( 07 Oct 2023 09:01 )   PT: 12.3 sec;   INR: 1.12 ratio         PTT - ( 09 Oct 2023 06:33 )  PTT:30.2 sec  CARDIAC MARKERS ( 08 Oct 2023 06:30 )  189 ng/L / x     / x     / 767 U/L / x     / 15.9 ng/mL  CARDIAC MARKERS ( 07 Oct 2023 19:37 )  177 ng/L / x     / x     / 875 U/L / x     / 15.1 ng/mL  CARDIAC MARKERS ( 07 Oct 2023 07:16 )  357 ng/L / x     / x     / 765 U/L / x     / 17.0 ng/mL  CARDIAC MARKERS ( 07 Oct 2023 03:51 )  410 ng/L / x     / x     / 499 U/L / x     / 12.3 ng/mL  CARDIAC MARKERS ( 06 Oct 2023 23:46 )  263 ng/L / x     / x     / x     / x     / x          Total Cholesterol: 148  LDL: --  HDL: 36  T        -------------------------------------------------------------------------------------------  Meds:  acetaminophen     Tablet .. 650 milliGRAM(s) Oral every 6 hours PRN  aspirin enteric coated 81 milliGRAM(s) Oral daily  atorvastatin 40 milliGRAM(s) Oral at bedtime  chlorhexidine 2% Cloths 1 Application(s) Topical <User Schedule>  heparin   Injectable 6000 Unit(s) IV Push every 6 hours PRN  heparin  Infusion.  Unit(s)/Hr IV Continuous <Continuous>  influenza  Vaccine (HIGH DOSE) 0.7 milliLiter(s) IntraMuscular once  tamsulosin 0.4 milliGRAM(s) Oral at bedtime    -------------------------------------------------------------------------------------------  Cardiovascular Diagnostic Testing:  TTE 10/7:  CONCLUSIONS:      1. Left ventricular cavity is normally sized. Left ventricular wall thickness is normal. Left ventricular systolic function is moderately decreased with an ejection fraction of 46 % by 3D.   2. Left ventricular global longitudinal strain is -13.7 % is abnormal (> -16%). Images were acquired on a [employer] ultrasound system and processed on the ultrasound machine with a heart rate of 61 bpm and a blood pressure of 145/80 mmHg. Regional strain pattern is consistent with coronary artery disease/ischemia.   3. Multiple segmental abnormalities exist. See findings.   4. There is mild (grade 1) left ventricular diastolic dysfunction.   5. Normal right ventricular cavity size, wall thickness and systolic function.   6. A bioprosthetic valve replacement present in the aortic position. Trace intravalvular regurgitation No paravalvular regurgitation.   7. Normal atria.   8. No significant valvular disease.   9. No pericardial effusion seen.  10. Mild pulmonic regurgitation.  11. No prior echocardiogram is available for comparison.    -------------------------------------------------------------------------------------------  Assessment and Plan:   73M with HTN, HLD, nonobstructive CAD from 2016 (30% RCA) TAA and AI s/p AVR and ascending aorta repair 2018 presenting after a syncopal episode, noted to have troponemia, although no chest pain. Syncope is without prodrome, concerning for possible cardiac etiology (arrythmia, valvular). While troponemia could be in the setting of syncope and brief hypotension, NSTEMI cannot be ruled out.     1. NSTEMI - no CP but trop found to be elevated iso syncopal episode - Trop peaked at 357 and downtrending. EKG w/ septal Q waves. TTE w/ EF 45% w/ WMA. Prior LHC w/ 30% RCA lesion from 2016  2. Syncope - reports syncope w/o prodrome, occurred while watching TV and woke up mildly confused.  3. LV Dysfunction - LVEF 46%    Recommendations:  - plan for [facility] today  - Continue ASA 81mg daily, atorva  - Continue Heparin gtt (48 hours) or until PCI  - Please start carvedilol 6.25mg BID  - EP consult post C    *** Recommendations are preliminary until cosigned by the attending.    Bruce Muhammda MD  Cardiology Fellow    For all New Consults and Questions:  [email or web address]   Login: [email or web address] Cardiology Progress Note  ------------------------------------------------------------------------------------------    SUBJECTIVE/EVENTS::  - Tele: no events  - NAEO    -------------------------------------------------------------------------------------------  ROS:  CV: chest pain (-), palpitation (-), orthopnea (-), PND (-), edema (-)  PULM: SOB (-), cough (-), wheezing (-), hemoptysis (-).   CONST: fever (-), chills (-) or fatigue (-)  GI: abdominal distension (-), abdominal pain (-) , nausea/vomiting (-), hematemesis, (-), melena (-), hematochezia (-)  : dysuria (-), frequency (-), hematuria (-).   NEURO: numbness (-), weakness (-), dizziness (-)  MSK: myalgia (-), joint pain (-)   SKIN: itching (-), rash (-)  HEENT:  visual changes (-); vertigo or throat pain (-);  neck stiffness (-)   Psych: change in mood (-), anxiety (-), depression (-)     All other review of systems is negative unless indicated above.   -------------------------------------------------------------------------------------------  VS:  T(F): 98 (10-09), Max: 98.5 (10-08)  HR: 66 (10-09) (66 - 83)  BP: 159/90 (10-09) (132/78 - 160/80)  RR: 17 (10-09)  SpO2: 93% (10-09)  I&O's Summary    08 Oct 2023 07:01  -  09 Oct 2023 07:00  --------------------------------------------------------  IN: 480 mL / OUT: 300 mL / NET: 180 mL      ------------------------------------------------------------------------------------------  PHYSICAL EXAM:  GEN: NAD, sitting in bed  HEENT: NCAT, EOMI  CV: RRR, Ns1/s2, no m/r/g, JVP not elevated  RESP: CTA B/l, no w/r/r  ABD: soft, nt, nd  Ext: warm, 2+ pulses, no edema  Neuro: AAOx3, no focal deficits    -------------------------------------------------------------------------------------------  LABS:                          15.1   8.22  )-----------( 168      ( 09 Oct 2023 06:33 )             44.1           PT/INR - ( 07 Oct 2023 09:01 )   PT: 12.3 sec;   INR: 1.12 ratio         PTT - ( 09 Oct 2023 06:33 )  PTT:30.2 sec  CARDIAC MARKERS ( 08 Oct 2023 06:30 )  189 ng/L / x     / x     / 767 U/L / x     / 15.9 ng/mL  CARDIAC MARKERS ( 07 Oct 2023 19:37 )  177 ng/L / x     / x     / 875 U/L / x     / 15.1 ng/mL  CARDIAC MARKERS ( 07 Oct 2023 07:16 )  357 ng/L / x     / x     / 765 U/L / x     / 17.0 ng/mL  CARDIAC MARKERS ( 07 Oct 2023 03:51 )  410 ng/L / x     / x     / 499 U/L / x     / 12.3 ng/mL  CARDIAC MARKERS ( 06 Oct 2023 23:46 )  263 ng/L / x     / x     / x     / x     / x          Total Cholesterol: 148  LDL: --  HDL: 36  T        -------------------------------------------------------------------------------------------  Meds:  acetaminophen     Tablet .. 650 milliGRAM(s) Oral every 6 hours PRN  aspirin enteric coated 81 milliGRAM(s) Oral daily  atorvastatin 40 milliGRAM(s) Oral at bedtime  chlorhexidine 2% Cloths 1 Application(s) Topical <User Schedule>  heparin   Injectable 6000 Unit(s) IV Push every 6 hours PRN  heparin  Infusion.  Unit(s)/Hr IV Continuous <Continuous>  influenza  Vaccine (HIGH DOSE) 0.7 milliLiter(s) IntraMuscular once  tamsulosin 0.4 milliGRAM(s) Oral at bedtime    -------------------------------------------------------------------------------------------  Cardiovascular Diagnostic Testing:  TTE 10/7:  CONCLUSIONS:      1. Left ventricular cavity is normally sized. Left ventricular wall thickness is normal. Left ventricular systolic function is moderately decreased with an ejection fraction of 46 % by 3D.   2. Left ventricular global longitudinal strain is -13.7 % is abnormal (> -16%). Images were acquired on a [employer] ultrasound system and processed on the ultrasound machine with a heart rate of 61 bpm and a blood pressure of 145/80 mmHg. Regional strain pattern is consistent with coronary artery disease/ischemia.   3. Multiple segmental abnormalities exist. See findings.   4. There is mild (grade 1) left ventricular diastolic dysfunction.   5. Normal right ventricular cavity size, wall thickness and systolic function.   6. A bioprosthetic valve replacement present in the aortic position. Trace intravalvular regurgitation No paravalvular regurgitation.   7. Normal atria.   8. No significant valvular disease.   9. No pericardial effusion seen.  10. Mild pulmonic regurgitation.  11. No prior echocardiogram is available for comparison.    -------------------------------------------------------------------------------------------  Assessment and Plan:   73M with HTN, HLD, nonobstructive CAD from 2016 (30% RCA) TAA and AI s/p AVR and ascending aorta repair 2018 presenting after a syncopal episode, noted to have troponemia, although no chest pain. Syncope is without prodrome, concerning for possible cardiac etiology (arrythmia, valvular). While troponemia could be in the setting of syncope and brief hypotension, NSTEMI cannot be ruled out.     1. NSTEMI - no CP but trop found to be elevated iso syncopal episode - Trop peaked at 357 and downtrending. EKG w/ septal Q waves. TTE w/ EF 45% w/ WMA. Prior LHC w/ 30% RCA lesion from 2016  2. Syncope - reports syncope w/o prodrome, occurred while watching TV and woke up mildly confused.  3. LV Dysfunction - LVEF 46%    Recommendations:  - plan for [facility] today  - Continue ASA 81mg daily, atorva  - Continue Heparin gtt (48 hours) or until PCI  - Please start losartan 25mg qd  - EP consult post C    *** Recommendations are preliminary until cosigned by the attending.    Bruce Muhammad MD  Cardiology Fellow    For all New Consults and Questions:  [email or web address]   Login: [email or web address] Cardiology Progress Note  ------------------------------------------------------------------------------------------    SUBJECTIVE/EVENTS::  - Tele: no events  - NAEO    -------------------------------------------------------------------------------------------  ROS:  CV: chest pain (-), palpitation (-), orthopnea (-), PND (-), edema (-)  PULM: SOB (-), cough (-), wheezing (-), hemoptysis (-).   CONST: fever (-), chills (-) or fatigue (-)  GI: abdominal distension (-), abdominal pain (-) , nausea/vomiting (-), hematemesis, (-), melena (-), hematochezia (-)  : dysuria (-), frequency (-), hematuria (-).   NEURO: numbness (-), weakness (-), dizziness (-)  MSK: myalgia (-), joint pain (-)   SKIN: itching (-), rash (-)  HEENT:  visual changes (-); vertigo or throat pain (-);  neck stiffness (-)   Psych: change in mood (-), anxiety (-), depression (-)     All other review of systems is negative unless indicated above.   -------------------------------------------------------------------------------------------  VS:  T(F): 98 (10-09), Max: 98.5 (10-08)  HR: 66 (10-09) (66 - 83)  BP: 159/90 (10-09) (132/78 - 160/80)  RR: 17 (10-09)  SpO2: 93% (10-09)  I&O's Summary    08 Oct 2023 07:01  -  09 Oct 2023 07:00  --------------------------------------------------------  IN: 480 mL / OUT: 300 mL / NET: 180 mL      ------------------------------------------------------------------------------------------  PHYSICAL EXAM:  GEN: NAD, sitting in bed  HEENT: NCAT, EOMI  CV: RRR, Ns1/s2, no m/r/g, JVP not elevated  RESP: CTA B/l, no w/r/r  ABD: soft, nt, nd  Ext: warm, 2+ pulses, no edema  Neuro: AAOx3, no focal deficits    -------------------------------------------------------------------------------------------  LABS:                          15.1   8.22  )-----------( 168      ( 09 Oct 2023 06:33 )             44.1           PT/INR - ( 07 Oct 2023 09:01 )   PT: 12.3 sec;   INR: 1.12 ratio         PTT - ( 09 Oct 2023 06:33 )  PTT:30.2 sec  CARDIAC MARKERS ( 08 Oct 2023 06:30 )  189 ng/L / x     / x     / 767 U/L / x     / 15.9 ng/mL  CARDIAC MARKERS ( 07 Oct 2023 19:37 )  177 ng/L / x     / x     / 875 U/L / x     / 15.1 ng/mL  CARDIAC MARKERS ( 07 Oct 2023 07:16 )  357 ng/L / x     / x     / 765 U/L / x     / 17.0 ng/mL  CARDIAC MARKERS ( 07 Oct 2023 03:51 )  410 ng/L / x     / x     / 499 U/L / x     / 12.3 ng/mL  CARDIAC MARKERS ( 06 Oct 2023 23:46 )  263 ng/L / x     / x     / x     / x     / x          Total Cholesterol: 148  LDL: --  HDL: 36  T        -------------------------------------------------------------------------------------------  Meds:  acetaminophen     Tablet .. 650 milliGRAM(s) Oral every 6 hours PRN  aspirin enteric coated 81 milliGRAM(s) Oral daily  atorvastatin 40 milliGRAM(s) Oral at bedtime  chlorhexidine 2% Cloths 1 Application(s) Topical <User Schedule>  heparin   Injectable 6000 Unit(s) IV Push every 6 hours PRN  heparin  Infusion.  Unit(s)/Hr IV Continuous <Continuous>  influenza  Vaccine (HIGH DOSE) 0.7 milliLiter(s) IntraMuscular once  tamsulosin 0.4 milliGRAM(s) Oral at bedtime    -------------------------------------------------------------------------------------------  Cardiovascular Diagnostic Testing:  TTE 10/7:  CONCLUSIONS:      1. Left ventricular cavity is normally sized. Left ventricular wall thickness is normal. Left ventricular systolic function is moderately decreased with an ejection fraction of 46 % by 3D.   2. Left ventricular global longitudinal strain is -13.7 % is abnormal (> -16%). Images were acquired on a [employer] ultrasound system and processed on the ultrasound machine with a heart rate of 61 bpm and a blood pressure of 145/80 mmHg. Regional strain pattern is consistent with coronary artery disease/ischemia.   3. Multiple segmental abnormalities exist. See findings.   4. There is mild (grade 1) left ventricular diastolic dysfunction.   5. Normal right ventricular cavity size, wall thickness and systolic function.   6. A bioprosthetic valve replacement present in the aortic position. Trace intravalvular regurgitation No paravalvular regurgitation.   7. Normal atria.   8. No significant valvular disease.   9. No pericardial effusion seen.  10. Mild pulmonic regurgitation.  11. No prior echocardiogram is available for comparison.    -------------------------------------------------------------------------------------------  Assessment and Plan:   73M with HTN, HLD, nonobstructive CAD from 2016 (30% RCA) TAA and AI s/p AVR and ascending aorta repair 2018 presenting after a syncopal episode, noted to have troponemia, although no chest pain. Syncope is without prodrome, concerning for possible cardiac etiology (arrythmia, valvular). While troponemia could be in the setting of syncope and brief hypotension, NSTEMI cannot be ruled out.     1. NSTEMI - no CP but trop found to be elevated iso syncopal episode - Trop peaked at 357 and downtrending. EKG w/ septal Q waves. TTE w/ EF 45% w/ WMA. Prior LHC w/ 30% RCA lesion from 2016  2. Syncope - reports syncope w/o prodrome, occurred while watching TV and woke up mildly confused.  3. LV Dysfunction - LVEF 46%    Recommendations:  - plan for [facility] today  - Continue ASA 81mg daily, atorva  - Continue Heparin gtt (48 hours) or until PCI  - Please start carvedilol 6.25mg BID  - Will need r/p TTE w/ definity after PCI prior to DC    *** Recommendations are preliminary until cosigned by the attending.    Bruce Muhammad MD  Cardiology Fellow    For all New Consults and Questions:  [email or web address]   Login: [employer]

## 2023-10-09 NOTE — PROGRESS NOTE ADULT - PROBLEM SELECTOR PLAN 1
- No prodromal symptoms   - No tongue biting or incontinence to suggest seizure  - CTH unremarkable and w/out acute neuro deficits to suggest CVA  - TTE shows new LV dysfunction(EF 46%) with SWMA  - Monitor on tele for arrythmia  - Orthostatics  -Plan for repeat C
- No prodromal symptoms   - No tongue biting or incontinence to suggest seizure  - CTH unremarkable and w/out acute neuro deficits to suggest CVA  - TTE shows new LV dysfunction(EF 46%) with SWMA  - Monitor on tele for arrythmia  - Orthostatics  -Plan for [facility] today--followup results.

## 2023-10-09 NOTE — PROGRESS NOTE ADULT - ASSESSMENT
72yo M w/ PMH of HTN, HLD, TAA and AI s/p AVR and ascending aorta repair in 2018 pw syncopal episode.  Pt w/ syncopal episode while watching TV this AM w/ reported unresponsiveness for a couple of minutes prompting call to EMS. Upon EMS's arrival, pt was awake, confused, and combative s/p Midazolam 5mg in ED. Patient with elevated Troponin seen by Cardiology overnight NSTEMI unable to be ruled out. Troponins peaked, patient to have syncope workup, LHC and afterward EP evaluation.

## 2023-10-09 NOTE — PROGRESS NOTE ADULT - PROBLEM SELECTOR PLAN 4
Plan for repeat [facility]   HFrEF  (EF = _45% _ on Date _10/7/2023 ____)  Heart failure.   Cardiology on board.   Daily weight reviewed today (decreased/stable/increased).  Guideline directed medical therapy as below: (Name/Dose/Frequency)  BB: Hold toprol as per cardiology   ARNI/ACE-I/ARB:Hold Enalapril as per cardiology.
Plan for repeat [facility]   HFrEF  (EF = _45% _ on Date _10/7/2023 ____)  Heart failure.   Cardiology on board.   Daily weight reviewed today (decreased/stable/increased).  Guideline directed medical therapy as below: (Name/Dose/Frequency)  BB: Hold toprol as per cardiology   ARNI/ACE-I/ARB:Hold Enalapril as per cardiology

## 2023-10-09 NOTE — PROGRESS NOTE ADULT - PROBLEM SELECTOR PLAN 7
DVT ppx: On heparin gtt  Diet: DASH  Dispo: pending clinical improvement
DVT ppx: On heparin gtt  Diet: DASH  Dispo: pending clinical improvement.

## 2023-10-09 NOTE — PROGRESS NOTE ADULT - PROBLEM SELECTOR PLAN 5
- Holding home Enalapril 5mg qd for now, per cards.
- Holding home Enalapril 5mg qd for now, per cards

## 2023-10-09 NOTE — PROGRESS NOTE ADULT - PROBLEM SELECTOR PLAN 2
- Trop 21 -> 263-->trended downward last  72.8   - Denies any CP and ECG w/out acute ischemic changes  -Started on Coreg 6.25mg BID   - Monitor on tele  - Trend CE  - Hep gtt  - Cards following, recs appreciated-->plan for repeat [facility] today.   -As per cardiology will need repeat TTE prior to discharge.
- Trop 21 -> 263-->trended downward last  72.8   - Denies any CP and ECG w/out acute ischemic changes  - Monitor on tele  - Trend CE  - Hep gtt  - Cards following, recs appreciated-->plan for repeat [facility]

## 2023-10-10 ENCOUNTER — TRANSCRIPTION ENCOUNTER (OUTPATIENT)
Age: 73
End: 2023-10-10

## 2023-10-10 VITALS
TEMPERATURE: 98 F | HEART RATE: 65 BPM | OXYGEN SATURATION: 96 % | SYSTOLIC BLOOD PRESSURE: 167 MMHG | RESPIRATION RATE: 18 BRPM | DIASTOLIC BLOOD PRESSURE: 89 MMHG

## 2023-10-10 LAB
ANION GAP SERPL CALC-SCNC: 16 MMOL/L — SIGNIFICANT CHANGE UP (ref 5–17)
BUN SERPL-MCNC: 14 MG/DL — SIGNIFICANT CHANGE UP (ref 7–23)
CALCIUM SERPL-MCNC: 9 MG/DL — SIGNIFICANT CHANGE UP (ref 8.4–10.5)
CHLORIDE SERPL-SCNC: 101 MMOL/L — SIGNIFICANT CHANGE UP (ref 96–108)
CO2 SERPL-SCNC: 21 MMOL/L — LOW (ref 22–31)
CREAT SERPL-MCNC: 0.86 MG/DL — SIGNIFICANT CHANGE UP (ref 0.5–1.3)
EGFR: 91 ML/MIN/1.73M2 — SIGNIFICANT CHANGE UP
GLUCOSE SERPL-MCNC: 114 MG/DL — HIGH (ref 70–99)
HCT VFR BLD CALC: 43.3 % — SIGNIFICANT CHANGE UP (ref 39–50)
HGB BLD-MCNC: 14.6 G/DL — SIGNIFICANT CHANGE UP (ref 13–17)
MAGNESIUM SERPL-MCNC: 2 MG/DL — SIGNIFICANT CHANGE UP (ref 1.6–2.6)
MCHC RBC-ENTMCNC: 29.9 PG — SIGNIFICANT CHANGE UP (ref 27–34)
MCHC RBC-ENTMCNC: 33.7 GM/DL — SIGNIFICANT CHANGE UP (ref 32–36)
MCV RBC AUTO: 88.5 FL — SIGNIFICANT CHANGE UP (ref 80–100)
NRBC # BLD: 0 /100 WBCS — SIGNIFICANT CHANGE UP (ref 0–0)
PLATELET # BLD AUTO: 185 K/UL — SIGNIFICANT CHANGE UP (ref 150–400)
POTASSIUM SERPL-MCNC: 3.5 MMOL/L — SIGNIFICANT CHANGE UP (ref 3.5–5.3)
POTASSIUM SERPL-SCNC: 3.5 MMOL/L — SIGNIFICANT CHANGE UP (ref 3.5–5.3)
RBC # BLD: 4.89 M/UL — SIGNIFICANT CHANGE UP (ref 4.2–5.8)
RBC # FLD: 13.1 % — SIGNIFICANT CHANGE UP (ref 10.3–14.5)
SODIUM SERPL-SCNC: 138 MMOL/L — SIGNIFICANT CHANGE UP (ref 135–145)
WBC # BLD: 7.7 K/UL — SIGNIFICANT CHANGE UP (ref 3.8–10.5)
WBC # FLD AUTO: 7.7 K/UL — SIGNIFICANT CHANGE UP (ref 3.8–10.5)

## 2023-10-10 PROCEDURE — 99239 HOSP IP/OBS DSCHRG MGMT >30: CPT

## 2023-10-10 PROCEDURE — 99233 SBSQ HOSP IP/OBS HIGH 50: CPT

## 2023-10-10 PROCEDURE — 75561 CARDIAC MRI FOR MORPH W/DYE: CPT | Mod: 26

## 2023-10-10 RX ORDER — LOSARTAN POTASSIUM 100 MG/1
25 TABLET, FILM COATED ORAL DAILY
Refills: 0 | Status: DISCONTINUED | OUTPATIENT
Start: 2023-10-10 | End: 2023-10-10

## 2023-10-10 RX ORDER — LOSARTAN POTASSIUM 100 MG/1
1 TABLET, FILM COATED ORAL
Qty: 30 | Refills: 0
Start: 2023-10-10 | End: 2023-11-08

## 2023-10-10 RX ORDER — CARVEDILOL PHOSPHATE 80 MG/1
1 CAPSULE, EXTENDED RELEASE ORAL
Qty: 60 | Refills: 0
Start: 2023-10-10 | End: 2023-11-08

## 2023-10-10 RX ORDER — ASPIRIN/CALCIUM CARB/MAGNESIUM 324 MG
1 TABLET ORAL
Qty: 30 | Refills: 0
Start: 2023-10-10 | End: 2023-11-08

## 2023-10-10 RX ORDER — ATORVASTATIN CALCIUM 80 MG/1
1 TABLET, FILM COATED ORAL
Qty: 30 | Refills: 0
Start: 2023-10-10 | End: 2023-11-08

## 2023-10-10 RX ADMIN — CARVEDILOL PHOSPHATE 6.25 MILLIGRAM(S): 80 CAPSULE, EXTENDED RELEASE ORAL at 05:10

## 2023-10-10 RX ADMIN — Medication 81 MILLIGRAM(S): at 12:25

## 2023-10-10 RX ADMIN — CHLORHEXIDINE GLUCONATE 1 APPLICATION(S): 213 SOLUTION TOPICAL at 05:11

## 2023-10-10 NOTE — PROGRESS NOTE ADULT - SUBJECTIVE AND OBJECTIVE BOX
Cardiology Progress Note  ------------------------------------------------------------------------------------------    SUBJECTIVE/EVENTS::  - Tele:   - Y/s LHC w/ mild luminal irregularities    -------------------------------------------------------------------------------------------  ROS:  CV: chest pain (-), palpitation (-), orthopnea (-), PND (-), edema (-)  PULM: SOB (-), cough (-), wheezing (-), hemoptysis (-).   CONST: fever (-), chills (-) or fatigue (-)  GI: abdominal distension (-), abdominal pain (-) , nausea/vomiting (-), hematemesis, (-), melena (-), hematochezia (-)  : dysuria (-), frequency (-), hematuria (-).   NEURO: numbness (-), weakness (-), dizziness (-)  MSK: myalgia (-), joint pain (-)   SKIN: itching (-), rash (-)  HEENT:  visual changes (-); vertigo or throat pain (-);  neck stiffness (-)   Psych: change in mood (-), anxiety (-), depression (-)     All other review of systems is negative unless indicated above.   -------------------------------------------------------------------------------------------  VS:  T(F): 97.2 (10-10), Max: 98.5 (10-09)  HR: 75 (10-10) (57 - 98)  BP: 138/83 (10-10) (96/60 - 168/66)  RR: 18 (10-10)  SpO2: 96% (10-10)  I&O's Summary    08 Oct 2023 07:  -  09 Oct 2023 07:00  --------------------------------------------------------  IN: 480 mL / OUT: 300 mL / NET: 180 mL    09 Oct 2023 07:01  -  10 Oct 2023 06:37  --------------------------------------------------------  IN: 300 mL / OUT: 0 mL / NET: 300 mL      ------------------------------------------------------------------------------------------  PHYSICAL EXAM:  GEN: NAD, sitting in bed  HEENT: NCAT, EOMI  CV: RRR, Ns1/s2, no m/r/g, JVP not elevated  RESP: CTA B/l, no w/r/r  ABD: soft, nt, nd  Ext: warm, 2+ pulses, no edema  Neuro: AAOx3, no focal deficits    -------------------------------------------------------------------------------------------  LABS:                          15.1   8.22  )-----------( 168      ( 09 Oct 2023 06:33 )             44.1           PTT - ( 09 Oct 2023 15:43 )  PTT:32.7 sec  CARDIAC MARKERS ( 08 Oct 2023 06:30 )  189 ng/L / x     / x     / 767 U/L / x     / 15.9 ng/mL  CARDIAC MARKERS ( 07 Oct 2023 19:37 )  177 ng/L / x     / x     / 875 U/L / x     / 15.1 ng/mL  CARDIAC MARKERS ( 07 Oct 2023 07:16 )  357 ng/L / x     / x     / 765 U/L / x     / 17.0 ng/mL  CARDIAC MARKERS ( 07 Oct 2023 03:51 )  410 ng/L / x     / x     / 499 U/L / x     / 12.3 ng/mL  CARDIAC MARKERS ( 06 Oct 2023 23:46 )  263 ng/L / x     / x     / x     / x     / x          Total Cholesterol: 148  LDL: --  HDL: 36  T        -------------------------------------------------------------------------------------------  Meds:  acetaminophen     Tablet .. 650 milliGRAM(s) Oral every 6 hours PRN  aspirin enteric coated 81 milliGRAM(s) Oral daily  atorvastatin 40 milliGRAM(s) Oral at bedtime  carvedilol 6.25 milliGRAM(s) Oral every 12 hours  chlorhexidine 2% Cloths 1 Application(s) Topical <User Schedule>  influenza  Vaccine (HIGH DOSE) 0.7 milliLiter(s) IntraMuscular once  sodium chloride 0.9%. 1000 milliLiter(s) IV Continuous <Continuous>  tamsulosin 0.4 milliGRAM(s) Oral at bedtime    -------------------------------------------------------------------------------------------  Cardiovascular Diagnostic Testing:  TTE 10/7:  CONCLUSIONS:      1. Left ventricular cavity is normally sized. Left ventricular wall thickness is normal. Left ventricular systolic function is moderately decreased with an ejection fraction of 46 % by 3D.   2. Left ventricular global longitudinal strain is -13.7 % is abnormal (> -16%). Images were acquired on a [employer] ultrasound system and processed on the ultrasound machine with a heart rate of 61 bpm and a blood pressure of 145/80 mmHg. Regional strain pattern is consistent with coronary artery disease/ischemia.   3. Multiple segmental abnormalities exist. See findings.   4. There is mild (grade 1) left ventricular diastolic dysfunction.   5. Normal right ventricular cavity size, wall thickness and systolic function.   6. A bioprosthetic valve replacement present in the aortic position. Trace intravalvular regurgitation No paravalvular regurgitation.   7. Normal atria.   8. No significant valvular disease.   9. No pericardial effusion seen.  10. Mild pulmonic regurgitation.  11. No prior echocardiogram is available for comparison.    [facility] 10/9  Diagnostic Conclusions:     Successful coronary angiography from right radial access site.   Recommendations:     Minimal luminal irregularities only, no culprit identified.     Continue GDMT and risk factor modification to slow progression of  coronary arterial disease    LM   Left main artery: tapered distally, unchanged compared to 2016.      LAD   Left anterior descending artery: normal.      CX   Circumflex: small vessel, luminal irregularities.      RCA   Right coronary artery: minimal luminal irregularities.        -------------------------------------------------------------------------------------------  Assessment and Plan:   73M with HTN, HLD, nonobstructive CAD from 2016 (30% RCA) TAA and AI s/p AVR and ascending aorta repair 2018 presenting after a syncopal episode, noted to have troponemia, although no chest pain. Syncope is without prodrome, concerning for possible cardiac etiology (arrythmia, valvular). While troponemia could be in the setting of syncope and brief hypotension, NSTEMI cannot be ruled out. S/p LHC w/ mild luminal irregularities and TTW w/ apical septal, apical inferior and apical akinesis. Picture c/f stress CMY.    1. NSTEMI - no CP but trop found to be elevated iso syncopal episode - Trop peaked at 357 and downtrending. EKG w/ septal Q waves. TTE w/ EF 45% w/ WMA. Prior LHC w/ 30% RCA lesion from 2016. S/p LHC w/ mild luminal irregularities and TTW w/ apical septal, apical inferior and apical akinesis. Picture c/f stress CMY.  2. Syncope - reports syncope w/o prodrome, occurred while watching TV and woke up mildly confused.  3. LV Dysfunction - LVEF 46%    Recommendations:  - f/up CMR to assess for stress CMY  - Start losartan 25mg qd  - Continue ASA 81mg daily, atorva  - Continue carvedilol 6.25mg BID    *** Recommendations are preliminary until cosigned by the attending.    Bruce Muhammad MD  Cardiology Fellow    For all New Consults and Questions:  [email or web address]   Login: [email or web address]   Cardiology Progress Note  ------------------------------------------------------------------------------------------    SUBJECTIVE/EVENTS::  - Tele: no evnets  - Y/d LHC w/ mild luminal irregularities  - NAEO    -------------------------------------------------------------------------------------------  ROS:  CV: chest pain (-), palpitation (-), orthopnea (-), PND (-), edema (-)  PULM: SOB (-), cough (-), wheezing (-), hemoptysis (-).   CONST: fever (-), chills (-) or fatigue (-)  GI: abdominal distension (-), abdominal pain (-) , nausea/vomiting (-), hematemesis, (-), melena (-), hematochezia (-)  : dysuria (-), frequency (-), hematuria (-).   NEURO: numbness (-), weakness (-), dizziness (-)  MSK: myalgia (-), joint pain (-)   SKIN: itching (-), rash (-)  HEENT:  visual changes (-); vertigo or throat pain (-);  neck stiffness (-)   Psych: change in mood (-), anxiety (-), depression (-)     All other review of systems is negative unless indicated above.   -------------------------------------------------------------------------------------------  VS:  T(F): 97.2 (10-10), Max: 98.5 (10-09)  HR: 75 (10-10) (57 - 98)  BP: 138/83 (10-10) (96/60 - 168/66)  RR: 18 (10-10)  SpO2: 96% (10-10)  I&O's Summary    08 Oct 2023 07:  -  09 Oct 2023 07:00  --------------------------------------------------------  IN: 480 mL / OUT: 300 mL / NET: 180 mL    09 Oct 2023 07:01  -  10 Oct 2023 06:37  --------------------------------------------------------  IN: 300 mL / OUT: 0 mL / NET: 300 mL      ------------------------------------------------------------------------------------------  PHYSICAL EXAM:  GEN: NAD, sitting in bed  HEENT: NCAT, EOMI  CV: RRR, Ns1/s2, no m/r/g, JVP not elevated  RESP: CTA B/l, no w/r/r  ABD: soft, nt, nd  Ext: warm, 2+ pulses, no edema, RRA access site clean  Neuro: AAOx3, no focal deficits    -------------------------------------------------------------------------------------------  LABS:                          15.1   8.22  )-----------( 168      ( 09 Oct 2023 06:33 )             44.1           PTT - ( 09 Oct 2023 15:43 )  PTT:32.7 sec  CARDIAC MARKERS ( 08 Oct 2023 06:30 )  189 ng/L / x     / x     / 767 U/L / x     / 15.9 ng/mL  CARDIAC MARKERS ( 07 Oct 2023 19:37 )  177 ng/L / x     / x     / 875 U/L / x     / 15.1 ng/mL  CARDIAC MARKERS ( 07 Oct 2023 07:16 )  357 ng/L / x     / x     / 765 U/L / x     / 17.0 ng/mL  CARDIAC MARKERS ( 07 Oct 2023 03:51 )  410 ng/L / x     / x     / 499 U/L / x     / 12.3 ng/mL  CARDIAC MARKERS ( 06 Oct 2023 23:46 )  263 ng/L / x     / x     / x     / x     / x          Total Cholesterol: 148  LDL: --  HDL: 36  T        -------------------------------------------------------------------------------------------  Meds:  acetaminophen     Tablet .. 650 milliGRAM(s) Oral every 6 hours PRN  aspirin enteric coated 81 milliGRAM(s) Oral daily  atorvastatin 40 milliGRAM(s) Oral at bedtime  carvedilol 6.25 milliGRAM(s) Oral every 12 hours  chlorhexidine 2% Cloths 1 Application(s) Topical <User Schedule>  influenza  Vaccine (HIGH DOSE) 0.7 milliLiter(s) IntraMuscular once  sodium chloride 0.9%. 1000 milliLiter(s) IV Continuous <Continuous>  tamsulosin 0.4 milliGRAM(s) Oral at bedtime    -------------------------------------------------------------------------------------------  Cardiovascular Diagnostic Testing:  TTE 10/7:  CONCLUSIONS:      1. Left ventricular cavity is normally sized. Left ventricular wall thickness is normal. Left ventricular systolic function is moderately decreased with an ejection fraction of 46 % by 3D.   2. Left ventricular global longitudinal strain is -13.7 % is abnormal (> -16%). Images were acquired on a [employer] ultrasound system and processed on the ultrasound machine with a heart rate of 61 bpm and a blood pressure of 145/80 mmHg. Regional strain pattern is consistent with coronary artery disease/ischemia.   3. Multiple segmental abnormalities exist. See findings.   4. There is mild (grade 1) left ventricular diastolic dysfunction.   5. Normal right ventricular cavity size, wall thickness and systolic function.   6. A bioprosthetic valve replacement present in the aortic position. Trace intravalvular regurgitation No paravalvular regurgitation.   7. Normal atria.   8. No significant valvular disease.   9. No pericardial effusion seen.  10. Mild pulmonic regurgitation.  11. No prior echocardiogram is available for comparison.    [facility] 10/9  Diagnostic Conclusions:     Successful coronary angiography from right radial access site.   Recommendations:     Minimal luminal irregularities only, no culprit identified.     Continue GDMT and risk factor modification to slow progression of  coronary arterial disease    LM   Left main artery: tapered distally, unchanged compared to 2016.      LAD   Left anterior descending artery: normal.      CX   Circumflex: small vessel, luminal irregularities.      RCA   Right coronary artery: minimal luminal irregularities.        -------------------------------------------------------------------------------------------  Assessment and Plan:   73M with HTN, HLD, nonobstructive CAD from 2016 (30% RCA) TAA and AI s/p AVR and ascending aorta repair 2018 presenting after a syncopal episode, noted to have troponemia, although no chest pain. Syncope is without prodrome, concerning for possible cardiac etiology (arrythmia, valvular). While troponemia could be in the setting of syncope and brief hypotension, NSTEMI cannot be ruled out. S/p LHC w/ mild luminal irregularities and TTW w/ apical septal, apical inferior and apical akinesis. Picture c/f stress CMY.    1. NSTEMI - no CP but trop found to be elevated iso syncopal episode - Trop peaked at 357 and downtrending. EKG w/ septal Q waves. TTE w/ EF 45% w/ WMA. Prior LHC w/ 30% RCA lesion from 2016. S/p LHC w/ mild luminal irregularities and TTW w/ apical septal, apical inferior and apical akinesis. Picture c/f stress CMY.  2. Syncope - reports syncope w/o prodrome, occurred while watching TV and woke up mildly confused.  3. LV Dysfunction - LVEF 46%    Recommendations:  - f/up CMR to assess for stress CMY  - Start losartan 25mg qd  - Continue carvedilol 6.25mg BID  - Continue ASA 81mg daily, atorva    *** Recommendations are preliminary until cosigned by the attending.    Bruce Muhammad MD  Cardiology Fellow    For all New Consults and Questions:  [email or web address]   Login: [email or web address]   Cardiology Progress Note  ------------------------------------------------------------------------------------------    SUBJECTIVE/EVENTS::  - Tele: no evnets  - Y/d LHC w/ mild luminal irregularities  - NAEO    -------------------------------------------------------------------------------------------  ROS:  CV: chest pain (-), palpitation (-), orthopnea (-), PND (-), edema (-)  PULM: SOB (-), cough (-), wheezing (-), hemoptysis (-).   CONST: fever (-), chills (-) or fatigue (-)  GI: abdominal distension (-), abdominal pain (-) , nausea/vomiting (-), hematemesis, (-), melena (-), hematochezia (-)  : dysuria (-), frequency (-), hematuria (-).   NEURO: numbness (-), weakness (-), dizziness (-)  MSK: myalgia (-), joint pain (-)   SKIN: itching (-), rash (-)  HEENT:  visual changes (-); vertigo or throat pain (-);  neck stiffness (-)   Psych: change in mood (-), anxiety (-), depression (-)     All other review of systems is negative unless indicated above.   -------------------------------------------------------------------------------------------  VS:  T(F): 97.2 (10-10), Max: 98.5 (10-09)  HR: 75 (10-10) (57 - 98)  BP: 138/83 (10-10) (96/60 - 168/66)  RR: 18 (10-10)  SpO2: 96% (10-10)  I&O's Summary    08 Oct 2023 07:  -  09 Oct 2023 07:00  --------------------------------------------------------  IN: 480 mL / OUT: 300 mL / NET: 180 mL    09 Oct 2023 07:01  -  10 Oct 2023 06:37  --------------------------------------------------------  IN: 300 mL / OUT: 0 mL / NET: 300 mL      ------------------------------------------------------------------------------------------  PHYSICAL EXAM:  GEN: NAD, sitting in bed  HEENT: NCAT, EOMI  CV: RRR, Ns1/s2, no m/r/g, JVP not elevated  RESP: CTA B/l, no w/r/r  ABD: soft, nt, nd  Ext: warm, 2+ pulses, no edema, RRA access site clean  Neuro: AAOx3, no focal deficits    -------------------------------------------------------------------------------------------  LABS:                          15.1   8.22  )-----------( 168      ( 09 Oct 2023 06:33 )             44.1           PTT - ( 09 Oct 2023 15:43 )  PTT:32.7 sec  CARDIAC MARKERS ( 08 Oct 2023 06:30 )  189 ng/L / x     / x     / 767 U/L / x     / 15.9 ng/mL  CARDIAC MARKERS ( 07 Oct 2023 19:37 )  177 ng/L / x     / x     / 875 U/L / x     / 15.1 ng/mL  CARDIAC MARKERS ( 07 Oct 2023 07:16 )  357 ng/L / x     / x     / 765 U/L / x     / 17.0 ng/mL  CARDIAC MARKERS ( 07 Oct 2023 03:51 )  410 ng/L / x     / x     / 499 U/L / x     / 12.3 ng/mL  CARDIAC MARKERS ( 06 Oct 2023 23:46 )  263 ng/L / x     / x     / x     / x     / x          Total Cholesterol: 148  LDL: --  HDL: 36  T        -------------------------------------------------------------------------------------------  Meds:  acetaminophen     Tablet .. 650 milliGRAM(s) Oral every 6 hours PRN  aspirin enteric coated 81 milliGRAM(s) Oral daily  atorvastatin 40 milliGRAM(s) Oral at bedtime  carvedilol 6.25 milliGRAM(s) Oral every 12 hours  chlorhexidine 2% Cloths 1 Application(s) Topical <User Schedule>  influenza  Vaccine (HIGH DOSE) 0.7 milliLiter(s) IntraMuscular once  sodium chloride 0.9%. 1000 milliLiter(s) IV Continuous <Continuous>  tamsulosin 0.4 milliGRAM(s) Oral at bedtime    -------------------------------------------------------------------------------------------  Cardiovascular Diagnostic Testing:  TTE 10/7:  CONCLUSIONS:      1. Left ventricular cavity is normally sized. Left ventricular wall thickness is normal. Left ventricular systolic function is moderately decreased with an ejection fraction of 46 % by 3D.   2. Left ventricular global longitudinal strain is -13.7 % is abnormal (> -16%). Images were acquired on a [employer] ultrasound system and processed on the ultrasound machine with a heart rate of 61 bpm and a blood pressure of 145/80 mmHg. Regional strain pattern is consistent with coronary artery disease/ischemia.   3. Multiple segmental abnormalities exist. See findings.   4. There is mild (grade 1) left ventricular diastolic dysfunction.   5. Normal right ventricular cavity size, wall thickness and systolic function.   6. A bioprosthetic valve replacement present in the aortic position. Trace intravalvular regurgitation No paravalvular regurgitation.   7. Normal atria.   8. No significant valvular disease.   9. No pericardial effusion seen.  10. Mild pulmonic regurgitation.  11. No prior echocardiogram is available for comparison.    [facility] 10/9  Diagnostic Conclusions:     Successful coronary angiography from right radial access site.   Recommendations:     Minimal luminal irregularities only, no culprit identified.     Continue GDMT and risk factor modification to slow progression of  coronary arterial disease    LM   Left main artery: tapered distally, unchanged compared to 2016.      LAD   Left anterior descending artery: normal.      CX   Circumflex: small vessel, luminal irregularities.      RCA   Right coronary artery: minimal luminal irregularities.        -------------------------------------------------------------------------------------------  Assessment and Plan:   73M with HTN, HLD, nonobstructive CAD from 2016 (30% RCA) TAA and AI s/p AVR and ascending aorta repair 2018 presenting after a syncopal episode, noted to have troponemia, although no chest pain. Syncope is without prodrome, concerning for possible cardiac etiology (arrythmia, valvular). While troponemia could be in the setting of syncope and brief hypotension, NSTEMI cannot be ruled out. S/p LHC w/ mild luminal irregularities and TTW w/ apical septal, apical inferior and apical akinesis. Picture c/f stress CMY.    1. NSTEMI - no CP but trop found to be elevated iso syncopal episode - Trop peaked at 357 and downtrending. EKG w/ septal Q waves. TTE w/ EF 45% w/ WMA. Prior LHC w/ 30% RCA lesion from 2016. S/p LHC w/ mild luminal irregularities and TTW w/ apical septal, apical inferior and apical akinesis. Picture c/f stress CMY.  2. Syncope - reports syncope w/o prodrome, occurred while watching TV and woke up mildly confused.  3. LV Dysfunction - LVEF 46%    Recommendations:  - f/up CMR to assess for stress CMY, if pt desires to leave today can also be done as an outpt  - Please start losartan 25mg qd  - Continue carvedilol 6.25mg BID  - Continue ASA 81mg daily, atorva  - please arrange f/up w/ outpt cardiology within 2w of DC    *** Recommendations are preliminary until cosigned by the attending.    Bruce Muhammad MD  Cardiology Fellow    For all New Consults and Questions:  [email or web address]   Login: [email or web address]   Cardiology Progress Note  ------------------------------------------------------------------------------------------    SUBJECTIVE/EVENTS::  - Tele: no evnets  - Y/d LHC w/ mild luminal irregularities  - NAEO    -------------------------------------------------------------------------------------------  ROS:  CV: chest pain (-), palpitation (-), orthopnea (-), PND (-), edema (-)  PULM: SOB (-), cough (-), wheezing (-), hemoptysis (-).   CONST: fever (-), chills (-) or fatigue (-)  GI: abdominal distension (-), abdominal pain (-) , nausea/vomiting (-), hematemesis, (-), melena (-), hematochezia (-)  : dysuria (-), frequency (-), hematuria (-).   NEURO: numbness (-), weakness (-), dizziness (-)  MSK: myalgia (-), joint pain (-)   SKIN: itching (-), rash (-)  HEENT:  visual changes (-); vertigo or throat pain (-);  neck stiffness (-)   Psych: change in mood (-), anxiety (-), depression (-)     All other review of systems is negative unless indicated above.   -------------------------------------------------------------------------------------------  VS:  T(F): 97.2 (10-10), Max: 98.5 (10-09)  HR: 75 (10-10) (57 - 98)  BP: 138/83 (10-10) (96/60 - 168/66)  RR: 18 (10-10)  SpO2: 96% (10-10)  I&O's Summary    08 Oct 2023 07:  -  09 Oct 2023 07:00  --------------------------------------------------------  IN: 480 mL / OUT: 300 mL / NET: 180 mL    09 Oct 2023 07:01  -  10 Oct 2023 06:37  --------------------------------------------------------  IN: 300 mL / OUT: 0 mL / NET: 300 mL      ------------------------------------------------------------------------------------------  PHYSICAL EXAM:  GEN: NAD, sitting in bed  HEENT: NCAT, EOMI  CV: RRR, Ns1/s2, no m/r/g, JVP not elevated  RESP: CTA B/l, no w/r/r  ABD: soft, nt, nd  Ext: warm, 2+ pulses, no edema, RRA access site clean  Neuro: AAOx3, no focal deficits    -------------------------------------------------------------------------------------------  LABS:                          15.1   8.22  )-----------( 168      ( 09 Oct 2023 06:33 )             44.1           PTT - ( 09 Oct 2023 15:43 )  PTT:32.7 sec  CARDIAC MARKERS ( 08 Oct 2023 06:30 )  189 ng/L / x     / x     / 767 U/L / x     / 15.9 ng/mL  CARDIAC MARKERS ( 07 Oct 2023 19:37 )  177 ng/L / x     / x     / 875 U/L / x     / 15.1 ng/mL  CARDIAC MARKERS ( 07 Oct 2023 07:16 )  357 ng/L / x     / x     / 765 U/L / x     / 17.0 ng/mL  CARDIAC MARKERS ( 07 Oct 2023 03:51 )  410 ng/L / x     / x     / 499 U/L / x     / 12.3 ng/mL  CARDIAC MARKERS ( 06 Oct 2023 23:46 )  263 ng/L / x     / x     / x     / x     / x          Total Cholesterol: 148  LDL: --  HDL: 36  T        -------------------------------------------------------------------------------------------  Meds:  acetaminophen     Tablet .. 650 milliGRAM(s) Oral every 6 hours PRN  aspirin enteric coated 81 milliGRAM(s) Oral daily  atorvastatin 40 milliGRAM(s) Oral at bedtime  carvedilol 6.25 milliGRAM(s) Oral every 12 hours  chlorhexidine 2% Cloths 1 Application(s) Topical <User Schedule>  influenza  Vaccine (HIGH DOSE) 0.7 milliLiter(s) IntraMuscular once  sodium chloride 0.9%. 1000 milliLiter(s) IV Continuous <Continuous>  tamsulosin 0.4 milliGRAM(s) Oral at bedtime    -------------------------------------------------------------------------------------------  Cardiovascular Diagnostic Testing:  TTE 10/7:  CONCLUSIONS:      1. Left ventricular cavity is normally sized. Left ventricular wall thickness is normal. Left ventricular systolic function is moderately decreased with an ejection fraction of 46 % by 3D.   2. Left ventricular global longitudinal strain is -13.7 % is abnormal (> -16%). Images were acquired on a [employer] ultrasound system and processed on the ultrasound machine with a heart rate of 61 bpm and a blood pressure of 145/80 mmHg. Regional strain pattern is consistent with coronary artery disease/ischemia.   3. Multiple segmental abnormalities exist. See findings.   4. There is mild (grade 1) left ventricular diastolic dysfunction.   5. Normal right ventricular cavity size, wall thickness and systolic function.   6. A bioprosthetic valve replacement present in the aortic position. Trace intravalvular regurgitation No paravalvular regurgitation.   7. Normal atria.   8. No significant valvular disease.   9. No pericardial effusion seen.  10. Mild pulmonic regurgitation.  11. No prior echocardiogram is available for comparison.    [facility] 10/9  Diagnostic Conclusions:     Successful coronary angiography from right radial access site.   Recommendations:     Minimal luminal irregularities only, no culprit identified.     Continue GDMT and risk factor modification to slow progression of  coronary arterial disease    LM   Left main artery: tapered distally, unchanged compared to 2016.      LAD   Left anterior descending artery: normal.      CX   Circumflex: small vessel, luminal irregularities.      RCA   Right coronary artery: minimal luminal irregularities.        -------------------------------------------------------------------------------------------  Assessment and Plan:   73M with HTN, HLD, nonobstructive CAD from 2016 (30% RCA) TAA and AI s/p AVR and ascending aorta repair 2018 presenting after a syncopal episode, noted to have troponemia, although no chest pain. Syncope is without prodrome, concerning for possible cardiac etiology (arrythmia, valvular). While troponemia could be in the setting of syncope and brief hypotension, NSTEMI cannot be ruled out. S/p LHC w/ mild luminal irregularities and TTW w/ apical septal, apical inferior and apical akinesis. Picture c/f stress CMY.    1. NSTEMI - no CP but trop found to be elevated iso syncopal episode - Trop peaked at 357 and downtrending. EKG w/ septal Q waves. TTE w/ EF 45% w/ WMA. Prior LHC w/ 30% RCA lesion from 2016. S/p LHC w/ mild luminal irregularities and TTW w/ apical septal, apical inferior and apical akinesis. Picture c/f stress CMY.  2. Syncope - reports syncope w/o prodrome, occurred while watching TV and woke up mildly confused.  3. LV Dysfunction - LVEF 46%    Recommendations:  - f/up CMR to assess for stress CMY, if pt desires to leave today can also be done as an outpt- cMRI reviewed no LGE, will uptitrate GDMT,   outpt workup for causes of NICM  - Please start losartan 25mg qd  - Continue carvedilol 6.25mg BID  - Continue ASA 81mg daily, atorva  Aorta enlarged on cMRI, will have fu imaging with Dr. Arauz  - please arrange f/up w/ outpt cardiology within 2w of DC, Dr. Arauz    *** Recommendations are preliminary until cosigned by the attending.    Bruce Muhammad MD  Cardiology Fellow    For all New Consults and Questions:  [email or web address]   Login: [email or web address]

## 2023-10-10 NOTE — DISCHARGE NOTE PROVIDER - PREFACE STATEMENT FOR MINUTES SPENT
Counseling and Referral of Other Preventative  (Italic type indicates deductible and co-insurance are waived)    Patient Name: Herman Chirinos  Today's Date: 10/9/2020    Health Maintenance       Date Due Completion Date    Shingles Vaccine (2 of 2) 12/24/2019 10/29/2019    PROSTATE-SPECIFIC ANTIGEN 04/26/2020 4/26/2019    Lipid Panel 04/26/2020 4/26/2019    Influenza Vaccine (1) 08/01/2020 10/29/2019    Colorectal Cancer Screening 04/07/2021 4/7/2016    Override on 7/27/2007: Done    TETANUS VACCINE 05/24/2028 5/24/2018        No orders of the defined types were placed in this encounter.    The following information is provided to all patients.  This information is to help you find resources for any of the problems found today that may be affecting your health:                Living healthy guide: www.[facility].[place].gov      Understanding Diabetes: www.diabetes.org      Eating healthy: www.cdc.gov/healthyweight      CDC home safety checklist: www.cdc.gov/steadi/patient.html      Agency on Aging: www.goea.[place].gov      Alcoholics anonymous (AA): www.aa.org      Physical Activity: www.kumar.nih.gov/ex2stdj      Tobacco use: www.quitwithusla.org      I personally spent

## 2023-10-10 NOTE — DISCHARGE NOTE PROVIDER - NSDCCPCAREPLAN_GEN_ALL_CORE_FT
PRINCIPAL DISCHARGE DIAGNOSIS  Diagnosis: Syncope  Assessment and Plan of Treatment:   reports syncope without prodrome, occurred while watching TV and woke up mildly confused.      SECONDARY DISCHARGE DIAGNOSES  Diagnosis: NSTEMI (non-ST elevation myocardial infarction)  Assessment and Plan of Treatment:   S/p LHC with mild luminal irregularities and TTE with apical septal, apical inferior and apical akinesis. Picture concerning for stress cardiomyopathy. Cardiac MRI with no evidence of myocardial scarring.   Please follow up with Dr Ferrer after discharge.

## 2023-10-10 NOTE — DISCHARGE NOTE PROVIDER - CARE PROVIDER_API CALL
Johnathon Ferrer  Cardiovascular Disease  [address], [address]  [place] [address]  Phone: [phone]  Fax: [phone]  Follow Up Time: Routine

## 2023-10-10 NOTE — DISCHARGE NOTE NURSING/CASE MANAGEMENT/SOCIAL WORK - PATIENT PORTAL LINK FT
You can access the FollowMyHealth Patient Portal offered by [facility] by registering at the following website: [email or web address]. By joining [employer]’s FollowMyHealth portal, you will also be able to view your health information using other applications (apps) compatible with our system.

## 2023-10-10 NOTE — DISCHARGE NOTE PROVIDER - NSDCMRMEDTOKEN_GEN_ALL_CORE_FT
aspirin 81 mg oral delayed release tablet: 1 tab(s) orally once a day  atorvastatin 40 mg oral tablet: 1 tab(s) orally once a day (at bedtime)  carvedilol 6.25 mg oral tablet: 1 tab(s) orally every 12 hours  losartan 25 mg oral tablet: 1 tab(s) orally once a day  tamsulosin 0.4 mg oral capsule: 1 cap(s) orally once a day

## 2023-10-10 NOTE — DISCHARGE NOTE PROVIDER - ATTENDING DISCHARGE PHYSICAL EXAMINATION:
CONSTITUTIONAL: No fever, weight loss  EYES: No eye pain, visual disturbances, or discharge  ENMT:  No difficulty hearing, tinnitus, vertigo; No sinus or throat pain  RESPIRATORY: No SOB. No cough, wheezing, chills or hemoptysis  CARDIOVASCULAR: No chest pain, palpitations, dizziness, or leg swelling  GASTROINTESTINAL: No abdominal or epigastric pain. No nausea, vomiting, or hematemesis; No diarrhea or constipation. No melena or hematochezia.  GENITOURINARY: No dysuria, frequency, hematuria, or incontinence  NEUROLOGICAL: No headaches, memory loss, loss of strength, numbness, or tremors  SKIN: No itching, burning, rashes, or lesions   LYMPH NODES: No enlarged glands  ENDOCRINE: No heat or cold intolerance; No hair loss  MUSCULOSKELETAL: No joint pain or swelling; No muscle, back pain  PSYCHIATRIC: No depression, anxiety, mood swings, or difficulty sleeping  HEME/LYMPH: No easy bruising, or bleeding gums CONSTITUTIONAL: Well-groomed, in no apparent distress  EYES: No conjunctival or scleral injection, non-icteric;   ENMT: No external nasal lesions; MMM  NECK: Trachea midline without palpable neck mass; thyroid not enlarged and non-tender  RESPIRATORY: Breathing comfortably; no dullness to percussion; lungs CTA without wheeze/rhonchi/rales  CARDIOVASCULAR: +S1S2, RRR, no M/G/R; pedal pulses full and symmetric; no lower extremity edema  GASTROINTESTINAL: No palpable masses or tenderness, +BS throughout, no rebound/guarding; no hepatosplenomegaly; no hernia palpated  LYMPHATIC: No cervical LAD or tenderness  SKIN: No rashes or ulcers noted  NEUROLOGIC: CN II-XII intact; sensation intact in LEs b/l to light touch  PSYCHIATRIC: A&Ox3; mood and affect appropriate; appropriate insight and judgment

## 2023-10-10 NOTE — DISCHARGE NOTE PROVIDER - HOSPITAL COURSE
Discharge Summary     Admission diagnoses:   Syncope      Discharge diagnoses:   Non-ST elevation myocardial infarction (NSTEMI)  Syncope  LV dysfunction    Hospital Course:   For full details, please see H&P, progress notes, consult notes and ancillary notes.   73M with HTN, HLD, nonobstructive CAD from 2016 (30% RCA) TAA and AI s/p AVR and ascending aorta repair 2018 presenting after a syncopal episode, noted to have troponemia, although no chest pain. Syncope is without prodrome, concerning for possible cardiac etiology (arrythmia, valvular). While troponemia could be in the setting of syncope and brief hypotension, NSTEMI cannot be ruled out.     1. NSTEMI - no CP but trop found to be elevated iso syncopal episode - Trop peaked at 357 and downtrending. EKG w/ septal Q waves. TTE w/ EF 45% w/ WMA. Prior LHC w/ 30% RCA lesion from 2016. S/p LHC w/ mild luminal irregularities and TTW w/ apical septal, apical inferior and apical akinesis. Picture c/f stress CMY. Cardiac MRI with no evidence of myocardial scarring.   2. Syncope - reports syncope w/o prodrome, occurred while watching TV and woke up mildly confused.  3. LV Dysfunction - LVEF 46%    On day of discharge, patient is clinically stable with no new exam findings or acute symptoms compared to prior. The patient was seen by the attending physician on the date of discharge and deemed stable and acceptable for discharge. The patient's chronic medical conditions were treated accordingly per the patient's home medication regimen. The patient's medication reconciliation (with changes made to chronic medications), follow up appointments, discharge orders, instructions, and significant lab and diagnostic studies are as noted.     Discharge follow up action items:     1. Follow up with PCP in 1-2 weeks.   2. Follow up with cardiology Dr Ferrer.    Patient's ordered code status: full code    Patient disposition: Home

## 2023-10-10 NOTE — DISCHARGE NOTE NURSING/CASE MANAGEMENT/SOCIAL WORK - NSDCPEFALRISK_GEN_ALL_CORE
For information on Fall & Injury Prevention, visit: [email or web address] OR  [email or web address] OR  https://www.cdc.gov/steadi/patient.html

## 2023-10-11 ENCOUNTER — NON-APPOINTMENT (OUTPATIENT)
Age: 73
End: 2023-10-11

## 2023-10-11 ENCOUNTER — TRANSCRIPTION ENCOUNTER (OUTPATIENT)
Age: 73
End: 2023-10-11

## 2023-10-11 LAB — BACTERIA UR CULT: NORMAL

## 2023-10-13 ENCOUNTER — APPOINTMENT (OUTPATIENT)
Dept: CARDIOLOGY | Facility: CLINIC | Age: 73
End: 2023-10-13
Payer: COMMERCIAL

## 2023-10-13 ENCOUNTER — NON-APPOINTMENT (OUTPATIENT)
Age: 73
End: 2023-10-13

## 2023-10-13 ENCOUNTER — TRANSCRIPTION ENCOUNTER (OUTPATIENT)
Age: 73
End: 2023-10-13

## 2023-10-13 VITALS
BODY MASS INDEX: 30.88 KG/M2 | OXYGEN SATURATION: 98 % | SYSTOLIC BLOOD PRESSURE: 108 MMHG | HEART RATE: 69 BPM | DIASTOLIC BLOOD PRESSURE: 60 MMHG | HEIGHT: 72 IN | WEIGHT: 228 LBS

## 2023-10-13 PROCEDURE — 99215 OFFICE O/P EST HI 40 MIN: CPT

## 2023-10-13 PROCEDURE — 93000 ELECTROCARDIOGRAM COMPLETE: CPT

## 2023-10-13 RX ORDER — METHYLPREDNISOLONE 4 MG/1
4 TABLET ORAL
Qty: 1 | Refills: 0 | Status: DISCONTINUED | COMMUNITY
Start: 2023-02-27 | End: 2023-10-13

## 2023-10-13 RX ORDER — CARVEDILOL 6.25 MG/1
6.25 TABLET, FILM COATED ORAL TWICE DAILY
Qty: 180 | Refills: 3 | Status: ACTIVE | COMMUNITY
Start: 2023-10-13 | End: 1900-01-01

## 2023-10-13 RX ORDER — METOPROLOL SUCCINATE 50 MG/1
50 TABLET, EXTENDED RELEASE ORAL
Qty: 90 | Refills: 3 | Status: DISCONTINUED | COMMUNITY
Start: 2018-02-09 | End: 2023-10-13

## 2023-10-13 RX ORDER — LOSARTAN POTASSIUM 25 MG/1
25 TABLET, FILM COATED ORAL
Qty: 90 | Refills: 3 | Status: ACTIVE | COMMUNITY
Start: 2023-10-13 | End: 1900-01-01

## 2023-10-13 RX ORDER — ENALAPRIL MALEATE 5 MG/1
5 TABLET ORAL
Qty: 90 | Refills: 3 | Status: DISCONTINUED | COMMUNITY
Start: 2018-11-12 | End: 2023-10-13

## 2023-10-13 RX ORDER — ATORVASTATIN CALCIUM 40 MG/1
40 TABLET, FILM COATED ORAL
Qty: 90 | Refills: 3 | Status: ACTIVE | COMMUNITY
Start: 2023-10-13 | End: 1900-01-01

## 2023-10-20 ENCOUNTER — TRANSCRIPTION ENCOUNTER (OUTPATIENT)
Age: 73
End: 2023-10-20

## 2023-10-20 ENCOUNTER — APPOINTMENT (OUTPATIENT)
Dept: CARDIOLOGY | Facility: CLINIC | Age: 73
End: 2023-10-20

## 2023-10-23 ENCOUNTER — APPOINTMENT (OUTPATIENT)
Dept: INTERNAL MEDICINE | Facility: CLINIC | Age: 73
End: 2023-10-23
Payer: COMMERCIAL

## 2023-10-23 VITALS
BODY MASS INDEX: 30.51 KG/M2 | DIASTOLIC BLOOD PRESSURE: 80 MMHG | HEIGHT: 72 IN | WEIGHT: 225.25 LBS | SYSTOLIC BLOOD PRESSURE: 145 MMHG | HEART RATE: 55 BPM

## 2023-10-23 DIAGNOSIS — S06.6XAA TRAUMATIC SUBARACHNOID HEMORRHAGE WITH LOSS OF CONSCIOUSNESS STATUS UNKNOWN, INITIAL ENCOUNTER: ICD-10-CM

## 2023-10-23 DIAGNOSIS — I35.9 NONRHEUMATIC AORTIC VALVE DISORDER, UNSPECIFIED: ICD-10-CM

## 2023-10-23 DIAGNOSIS — Z00.00 ENCOUNTER FOR GENERAL ADULT MEDICAL EXAMINATION W/OUT ABNORMAL FINDINGS: ICD-10-CM

## 2023-10-23 DIAGNOSIS — G47.30 SLEEP APNEA, UNSPECIFIED: ICD-10-CM

## 2023-10-23 DIAGNOSIS — I71.21 ANEURYSM OF THE ASCENDING AORTA, WITHOUT RUPTURE: ICD-10-CM

## 2023-10-23 PROCEDURE — G0008: CPT

## 2023-10-23 PROCEDURE — 36415 COLL VENOUS BLD VENIPUNCTURE: CPT

## 2023-10-23 PROCEDURE — 90662 IIV NO PRSV INCREASED AG IM: CPT

## 2023-10-23 PROCEDURE — 99397 PER PM REEVAL EST PAT 65+ YR: CPT | Mod: 25

## 2023-10-24 ENCOUNTER — CLINICAL ADVICE (OUTPATIENT)
Age: 73
End: 2023-10-24

## 2023-10-24 LAB
ALBUMIN SERPL ELPH-MCNC: 4.4 G/DL
ALP BLD-CCNC: 54 U/L
ALT SERPL-CCNC: 17 U/L
ANION GAP SERPL CALC-SCNC: 9 MMOL/L
APPEARANCE: CLEAR
AST SERPL-CCNC: 15 U/L
BACTERIA: NEGATIVE /HPF
BASOPHILS # BLD AUTO: 0.03 K/UL
BASOPHILS NFR BLD AUTO: 0.4 %
BILIRUB DIRECT SERPL-MCNC: 0.1 MG/DL
BILIRUB INDIRECT SERPL-MCNC: 0.4 MG/DL
BILIRUB SERPL-MCNC: 0.5 MG/DL
BILIRUBIN URINE: NEGATIVE
BLOOD URINE: NEGATIVE
BUN SERPL-MCNC: 19 MG/DL
CALCIUM SERPL-MCNC: 9.4 MG/DL
CAST: 0 /LPF
CHLORIDE SERPL-SCNC: 101 MMOL/L
CHOLEST SERPL-MCNC: 121 MG/DL
CO2 SERPL-SCNC: 28 MMOL/L
COLOR: YELLOW
CREAT SERPL-MCNC: 0.88 MG/DL
EGFR: 90 ML/MIN/1.73M2
EOSINOPHIL # BLD AUTO: 0.17 K/UL
EOSINOPHIL NFR BLD AUTO: 2.1 %
EPITHELIAL CELLS: 0 /HPF
ESTIMATED AVERAGE GLUCOSE: 131 MG/DL
FERRITIN SERPL-MCNC: 329 NG/ML
FOLATE SERPL-MCNC: 13.2 NG/ML
GLUCOSE QUALITATIVE U: NEGATIVE MG/DL
GLUCOSE SERPL-MCNC: 98 MG/DL
HBA1C MFR BLD HPLC: 6.2 %
HCT VFR BLD CALC: 46 %
HDLC SERPL-MCNC: 32 MG/DL
HGB BLD-MCNC: 15.2 G/DL
IMM GRANULOCYTES NFR BLD AUTO: 0.2 %
IRON SATN MFR SERPL: 27 %
IRON SERPL-MCNC: 70 UG/DL
KETONES URINE: NEGATIVE MG/DL
LDLC SERPL CALC-MCNC: 69 MG/DL
LEUKOCYTE ESTERASE URINE: NEGATIVE
LYMPHOCYTES # BLD AUTO: 1.93 K/UL
LYMPHOCYTES NFR BLD AUTO: 23.8 %
MAN DIFF?: NORMAL
MCHC RBC-ENTMCNC: 29.4 PG
MCHC RBC-ENTMCNC: 33 GM/DL
MCV RBC AUTO: 89 FL
MICROSCOPIC-UA: NORMAL
MONOCYTES # BLD AUTO: 0.75 K/UL
MONOCYTES NFR BLD AUTO: 9.3 %
NEUTROPHILS # BLD AUTO: 5.2 K/UL
NEUTROPHILS NFR BLD AUTO: 64.2 %
NITRITE URINE: NEGATIVE
NONHDLC SERPL-MCNC: 89 MG/DL
PH URINE: 6
PLATELET # BLD AUTO: 236 K/UL
POTASSIUM SERPL-SCNC: 4.6 MMOL/L
PROT SERPL-MCNC: 7.2 G/DL
PROTEIN URINE: NEGATIVE MG/DL
RBC # BLD: 5.17 M/UL
RBC # FLD: 12.8 %
RED BLOOD CELLS URINE: 0 /HPF
SODIUM SERPL-SCNC: 137 MMOL/L
SPECIFIC GRAVITY URINE: 1.01
TIBC SERPL-MCNC: 262 UG/DL
TRIGL SERPL-MCNC: 112 MG/DL
TSH SERPL-ACNC: 0.69 UIU/ML
UIBC SERPL-MCNC: 192 UG/DL
UROBILINOGEN URINE: 0.2 MG/DL
VIT B12 SERPL-MCNC: 513 PG/ML
WBC # FLD AUTO: 8.1 K/UL
WHITE BLOOD CELLS URINE: 0 /HPF

## 2023-10-27 RX ORDER — SIMVASTATIN 20 MG/1
1 TABLET, FILM COATED ORAL
Refills: 0 | DISCHARGE

## 2023-10-27 RX ORDER — METOPROLOL TARTRATE 50 MG
1 TABLET ORAL
Refills: 0 | DISCHARGE

## 2023-11-03 ENCOUNTER — TRANSCRIPTION ENCOUNTER (OUTPATIENT)
Age: 73
End: 2023-11-03

## 2023-11-13 ENCOUNTER — APPOINTMENT (OUTPATIENT)
Dept: CARDIOLOGY | Facility: CLINIC | Age: 73
End: 2023-11-13
Payer: COMMERCIAL

## 2023-11-13 ENCOUNTER — APPOINTMENT (OUTPATIENT)
Dept: CARDIOLOGY | Facility: CLINIC | Age: 73
End: 2023-11-13

## 2023-11-17 ENCOUNTER — NON-APPOINTMENT (OUTPATIENT)
Age: 73
End: 2023-11-17

## 2023-11-17 ENCOUNTER — APPOINTMENT (OUTPATIENT)
Dept: CARDIOLOGY | Facility: CLINIC | Age: 73
End: 2023-11-17
Payer: COMMERCIAL

## 2023-11-17 VITALS
WEIGHT: 224 LBS | OXYGEN SATURATION: 99 % | DIASTOLIC BLOOD PRESSURE: 76 MMHG | SYSTOLIC BLOOD PRESSURE: 142 MMHG | HEART RATE: 51 BPM | BODY MASS INDEX: 30.38 KG/M2

## 2023-11-17 PROBLEM — E78.5 HYPERLIPIDEMIA, UNSPECIFIED: Chronic | Status: ACTIVE | Noted: 2023-10-07

## 2023-11-17 PROBLEM — I71.20 THORACIC AORTIC ANEURYSM, WITHOUT RUPTURE, UNSPECIFIED: Chronic | Status: ACTIVE | Noted: 2023-10-07

## 2023-11-17 PROBLEM — I35.1 NONRHEUMATIC AORTIC (VALVE) INSUFFICIENCY: Chronic | Status: ACTIVE | Noted: 2023-10-07

## 2023-11-17 PROBLEM — I10 ESSENTIAL (PRIMARY) HYPERTENSION: Chronic | Status: ACTIVE | Noted: 2023-10-07

## 2023-11-17 PROCEDURE — 99214 OFFICE O/P EST MOD 30 MIN: CPT

## 2023-11-17 PROCEDURE — 93000 ELECTROCARDIOGRAM COMPLETE: CPT

## 2023-11-17 RX ORDER — UBIDECARENONE 30 MG
81 CAPSULE ORAL
Refills: 0 | Status: ACTIVE | COMMUNITY

## 2024-01-01 NOTE — ED ADULT NURSE NOTE - TEMPLATE
Ruchi London MD  Pediatric Dermatology  Department of Dermatology  [id number] [place] [id number]  Phone: [phone]   Voicemail: [phone]   Fax: [phone]         OUTPATIENT PROPRANOLOL INITIATION FOR   INFANTILE HEMANGIOMAS    Propranolol is a medication which has been used for many decades and it is considered to be very safe, but like all medications, it has potential risks.  For over 15 years it has been used to treat hemangiomas, and the results in thousands of patients treated so far are impressive.  Treatment of infantile hemangiomas with propranolol is approved by the [place] Food and Drug Administration.      SIDE EFFECTS  The most worrisome side effect that has been observed in using propranolol for hemangiomas is LOW BLOOD SUGAR (hypoglycemia).  It is more likely to occur if your child has had decreased intake of breast milk, formula, or food.  To keep the risk of hypoglycemia as low as possible, please follow these recommendations:   FEEDING:  Feed your child BEFORE giving the medication  Feed your child frequently    Infants less than 6 months of age should be fed every 4 to 6  hours   Infants older than 6 months (especially if eating solid foods) should be fed every 6 to 8 hours  SLEEPING  Younger infants (less than 6 months old) should be woken up every 6 to 8 hours to be fed   Older infants (more than 6 months old) should not sleep more than 8 to 10 hours without being woken up to feed  WHEN TO TEMPORARILY STOP THE MEDICATION:   Stop the medication for 2-3 days if your child is not eating well because of illness, has an active infection, is vomiting or having severe diarrhea.  The medication can be started 2-3 days after the problem has resolved.   Stop the medication if your child needs to stop eating for several hours for a procedure such as an MRI or surgery.   Have some Pedialyte or other oral rehydration liquid on hand to give to your child if not eating well.   This type of liquid is designed promote quick fluid absorption while a child is sick and contains sugars and certain salts which are helpful during an illness.  For infants less than 6 months:  Must be fed every 4 to 6  hours in order to avoid hypoglycemia (low blood sugar)  For all patients:  If your child is vomiting or not eating well due to an illness or is fasting before surgery, then you must stop the medication and not restart it for at least 48 hours later - after normal eating resumes to avoid low blood sugar    TO MINIMIZE OTHER RISKS  Measure weight, heart rate, and blood pressure at every medical visit.  If there is a history of heart disease, or concern that there may be a heart condition, we do not initiate treatment without consultation from heart specialist.  If there is a history of wheezing or asthma, let us know.  Propranolol may make wheezing or asthma worse.  Medication history: Many drugs interact with propranolol.  A thorough medication history will be taken before starting the medication.  Please check with your pharmacist if you are adding any new medications.    OTHER IMPORTANT THINGS TO KNOW  For all babies: Your child must be eating regularly or medication should be stopped until eating is resumed.  If your child is less than 6 months old we recommend feeding every 4 - 6 hours including at least one nighttime feeding to avoid low blood sugar.  Reminder:  Be sure that baby has eaten before each dose and 3 to 4 hours after each dose.       Hold medication (do not give) dose if excessive fussiness, lethargy or other unusual sign/symptom and contact your primary care provider or pediatric dermatologist at [phone].     INITIATING PROPRANOLOL THERAPY:    The liquid form of propranolol comes in a formulation of 4 mg/mL.  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  Ulceration is the most common complication seen in hemangiomas.  It is most common in areas of friction and moisture such as around the mouth and in the diaper area, but can happen at any site.       We have 2 goals in managing ulceration:  Get the ulcer heal   Keep your baby as pain-free as possible    GETTING THE ULCER TO HEAL:  Wounds heal best in a moist clean environment.  The best way for this to happen is to cover the hemangioma so it is not open to the air.  Keeping it covered also help to decrease pain, since much of the pain is due to nerve endings being exposed to the air.  In some locations covering the ulcer is easy; in others such as on a curved surface or near the mouth or anus it can be more difficult.     General instructions for topical care and occlusion:   We usually apply a small amount of topical antibiotic (such as Metrogel or Bactroban) to the ulcer base.  We then use a large amount of petroleum based emollient such as petrolatum or Aquaphor and keep this in place with some kind of covering such as non-stick.  In areas where a bandage cannot be placed (such as in the diaper area), you may be able to use petroleum gauze to protect the area, changing with each diaper change.      Instructions for home-made petroleum gauze:  Take clean or sterile gauze and mix a large amount of petroleum jelly or Aquaphor until it is saturated with the grease.  Then apply to wound.  It should have so much grease than even a few hours later it doesn't stick to the wound.    Other  treatment options:    Regranex (Becaplermin; Platelet-derived growth factor):  While not officially approved for ulcerated hemangiomas Regranex can be very helpful.  It only works on a clean wound bed which does not have a crust, scab, or debris in the wound.  It is used once a day to the ulcer.  Once the ulcer heals we stop using it immediately.    Oral propranolol: This is sometimes necessary in low doses to use particularly if the hemangioma does not respond to topical therapies.   Surgery to remove the hemangioma - uncommon to pursue but on occasion considered depending on extent and severity.     PAIN CONTROL   Topical Lidocaine ointment 4% can help to numb the ulcer.  Too much can be toxic to a baby, but using a very small amount (such as pea-sized amount) is safe every 4 to 6 hours.  This can work so well that you might be tempted to use more often, so please remember that too much can be dangerous.  Babies usually cry when the medication is being applied, but then stop crying within minutes and are pain free for a few hours.  Acetaminophen can be very helpful given every 4-6 hours.     SPECIFIC INSTRUCTIONS FOR YOUR CHILD:   -Pain: Start lidocaine 4% gel and to re-apply every 4-6 hours as needed and recommended Tylenol infant drops as needed.  -Topical Antibiotic: apply metronidazole 1% gel twice daily to ulceration  -Dressing: liberal amount of Aquaphor or Petroleum jelly to the ulcerated area twice daily.  Cover hemangioma with Vaseline impregnated gauze. Can cover with a dry gauze and keep in place with paper tape.       General

## 2024-01-17 ENCOUNTER — INPATIENT (INPATIENT)
Facility: HOSPITAL | Age: 74
LOS: 0 days | Discharge: HOME | DRG: 101 | End: 2024-01-18
Attending: HOSPITALIST | Admitting: STUDENT IN AN ORGANIZED HEALTH CARE EDUCATION/TRAINING PROGRAM
Payer: COMMERCIAL

## 2024-01-17 VITALS
HEART RATE: 83 BPM | DIASTOLIC BLOOD PRESSURE: 61 MMHG | RESPIRATION RATE: 18 BRPM | SYSTOLIC BLOOD PRESSURE: 129 MMHG | OXYGEN SATURATION: 98 % | WEIGHT: 233.69 LBS | TEMPERATURE: 98 F

## 2024-01-17 DIAGNOSIS — R41.89 OTHER SYMPTOMS AND SIGNS INVOLVING COGNITIVE FUNCTIONS AND AWARENESS: ICD-10-CM

## 2024-01-17 DIAGNOSIS — Z98.890 OTHER SPECIFIED POSTPROCEDURAL STATES: Chronic | ICD-10-CM

## 2024-01-17 DIAGNOSIS — Z95.2 PRESENCE OF PROSTHETIC HEART VALVE: Chronic | ICD-10-CM

## 2024-01-17 LAB
ALBUMIN SERPL ELPH-MCNC: 4.3 G/DL — SIGNIFICANT CHANGE UP (ref 3.3–5)
ALP SERPL-CCNC: 52 U/L — SIGNIFICANT CHANGE UP (ref 40–120)
ALT FLD-CCNC: 30 U/L — SIGNIFICANT CHANGE UP (ref 10–45)
AMPHET UR-MCNC: NEGATIVE — SIGNIFICANT CHANGE UP
ANION GAP SERPL CALC-SCNC: 13 MMOL/L — SIGNIFICANT CHANGE UP (ref 5–17)
APPEARANCE UR: CLEAR — SIGNIFICANT CHANGE UP
APTT BLD: 28.9 SEC — SIGNIFICANT CHANGE UP (ref 24.5–35.6)
AST SERPL-CCNC: 23 U/L — SIGNIFICANT CHANGE UP (ref 10–40)
BARBITURATES UR SCN-MCNC: NEGATIVE — SIGNIFICANT CHANGE UP
BASE EXCESS BLDV CALC-SCNC: 0.1 MMOL/L — SIGNIFICANT CHANGE UP (ref -2–3)
BASOPHILS # BLD AUTO: 0.02 K/UL — SIGNIFICANT CHANGE UP (ref 0–0.2)
BASOPHILS NFR BLD AUTO: 0.2 % — SIGNIFICANT CHANGE UP (ref 0–2)
BENZODIAZ UR-MCNC: NEGATIVE — SIGNIFICANT CHANGE UP
BILIRUB SERPL-MCNC: 0.4 MG/DL — SIGNIFICANT CHANGE UP (ref 0.2–1.2)
BILIRUB UR-MCNC: NEGATIVE — SIGNIFICANT CHANGE UP
BUN SERPL-MCNC: 18 MG/DL — SIGNIFICANT CHANGE UP (ref 7–23)
CA-I SERPL-SCNC: 1.22 MMOL/L — SIGNIFICANT CHANGE UP (ref 1.15–1.33)
CALCIUM SERPL-MCNC: 9.3 MG/DL — SIGNIFICANT CHANGE UP (ref 8.4–10.5)
CHLORIDE BLDV-SCNC: 102 MMOL/L — SIGNIFICANT CHANGE UP (ref 96–108)
CHLORIDE SERPL-SCNC: 104 MMOL/L — SIGNIFICANT CHANGE UP (ref 96–108)
CO2 BLDV-SCNC: 28 MMOL/L — HIGH (ref 22–26)
CO2 SERPL-SCNC: 24 MMOL/L — SIGNIFICANT CHANGE UP (ref 22–31)
COCAINE METAB.OTHER UR-MCNC: NEGATIVE — SIGNIFICANT CHANGE UP
COLOR SPEC: YELLOW — SIGNIFICANT CHANGE UP
CREAT SERPL-MCNC: 0.86 MG/DL — SIGNIFICANT CHANGE UP (ref 0.5–1.3)
DIFF PNL FLD: NEGATIVE — SIGNIFICANT CHANGE UP
EGFR: 90 ML/MIN/1.73M2 — SIGNIFICANT CHANGE UP
EOSINOPHIL # BLD AUTO: 0.24 K/UL — SIGNIFICANT CHANGE UP (ref 0–0.5)
EOSINOPHIL NFR BLD AUTO: 2.7 % — SIGNIFICANT CHANGE UP (ref 0–6)
FLUAV AG NPH QL: SIGNIFICANT CHANGE UP
FLUBV AG NPH QL: SIGNIFICANT CHANGE UP
GAS PNL BLDV: 135 MMOL/L — LOW (ref 136–145)
GAS PNL BLDV: SIGNIFICANT CHANGE UP
GLUCOSE BLDV-MCNC: 111 MG/DL — HIGH (ref 70–99)
GLUCOSE SERPL-MCNC: 115 MG/DL — HIGH (ref 70–99)
GLUCOSE UR QL: NEGATIVE MG/DL — SIGNIFICANT CHANGE UP
HCO3 BLDV-SCNC: 27 MMOL/L — SIGNIFICANT CHANGE UP (ref 22–29)
HCT VFR BLD CALC: 44.9 % — SIGNIFICANT CHANGE UP (ref 39–50)
HCT VFR BLDA CALC: 44 % — SIGNIFICANT CHANGE UP (ref 39–51)
HGB BLD CALC-MCNC: 14.7 G/DL — SIGNIFICANT CHANGE UP (ref 12.6–17.4)
HGB BLD-MCNC: 14.8 G/DL — SIGNIFICANT CHANGE UP (ref 13–17)
IMM GRANULOCYTES NFR BLD AUTO: 0.3 % — SIGNIFICANT CHANGE UP (ref 0–0.9)
INR BLD: 1.01 RATIO — SIGNIFICANT CHANGE UP (ref 0.85–1.18)
KETONES UR-MCNC: NEGATIVE MG/DL — SIGNIFICANT CHANGE UP
LACTATE BLDV-MCNC: 3.3 MMOL/L — HIGH (ref 0.5–2)
LACTATE SERPL-SCNC: 1.1 MMOL/L — SIGNIFICANT CHANGE UP (ref 0.5–2)
LEUKOCYTE ESTERASE UR-ACNC: NEGATIVE — SIGNIFICANT CHANGE UP
LYMPHOCYTES # BLD AUTO: 3.09 K/UL — SIGNIFICANT CHANGE UP (ref 1–3.3)
LYMPHOCYTES # BLD AUTO: 34.7 % — SIGNIFICANT CHANGE UP (ref 13–44)
MCHC RBC-ENTMCNC: 29.3 PG — SIGNIFICANT CHANGE UP (ref 27–34)
MCHC RBC-ENTMCNC: 33 GM/DL — SIGNIFICANT CHANGE UP (ref 32–36)
MCV RBC AUTO: 88.9 FL — SIGNIFICANT CHANGE UP (ref 80–100)
METHADONE UR-MCNC: NEGATIVE — SIGNIFICANT CHANGE UP
MONOCYTES # BLD AUTO: 0.79 K/UL — SIGNIFICANT CHANGE UP (ref 0–0.9)
MONOCYTES NFR BLD AUTO: 8.9 % — SIGNIFICANT CHANGE UP (ref 2–14)
NEUTROPHILS # BLD AUTO: 4.73 K/UL — SIGNIFICANT CHANGE UP (ref 1.8–7.4)
NEUTROPHILS NFR BLD AUTO: 53.2 % — SIGNIFICANT CHANGE UP (ref 43–77)
NITRITE UR-MCNC: NEGATIVE — SIGNIFICANT CHANGE UP
NRBC # BLD: 0 /100 WBCS — SIGNIFICANT CHANGE UP (ref 0–0)
OPIATES UR-MCNC: NEGATIVE — SIGNIFICANT CHANGE UP
OXYCODONE UR-MCNC: NEGATIVE — SIGNIFICANT CHANGE UP
PCO2 BLDV: 51 MMHG — SIGNIFICANT CHANGE UP (ref 42–55)
PCP SPEC-MCNC: SIGNIFICANT CHANGE UP
PCP UR-MCNC: NEGATIVE — SIGNIFICANT CHANGE UP
PH BLDV: 7.33 — SIGNIFICANT CHANGE UP (ref 7.32–7.43)
PH UR: 6 — SIGNIFICANT CHANGE UP (ref 5–8)
PLATELET # BLD AUTO: 195 K/UL — SIGNIFICANT CHANGE UP (ref 150–400)
PO2 BLDV: 28 MMHG — SIGNIFICANT CHANGE UP (ref 25–45)
POTASSIUM BLDV-SCNC: 3.7 MMOL/L — SIGNIFICANT CHANGE UP (ref 3.5–5.1)
POTASSIUM SERPL-MCNC: 3.8 MMOL/L — SIGNIFICANT CHANGE UP (ref 3.5–5.3)
POTASSIUM SERPL-SCNC: 3.8 MMOL/L — SIGNIFICANT CHANGE UP (ref 3.5–5.3)
PROT SERPL-MCNC: 7.2 G/DL — SIGNIFICANT CHANGE UP (ref 6–8.3)
PROT UR-MCNC: NEGATIVE MG/DL — SIGNIFICANT CHANGE UP
PROTHROM AB SERPL-ACNC: 11.1 SEC — SIGNIFICANT CHANGE UP (ref 9.5–13)
RBC # BLD: 5.05 M/UL — SIGNIFICANT CHANGE UP (ref 4.2–5.8)
RBC # FLD: 12.7 % — SIGNIFICANT CHANGE UP (ref 10.3–14.5)
RSV RNA NPH QL NAA+NON-PROBE: SIGNIFICANT CHANGE UP
SAO2 % BLDV: 45.6 % — LOW (ref 67–88)
SARS-COV-2 RNA SPEC QL NAA+PROBE: SIGNIFICANT CHANGE UP
SODIUM SERPL-SCNC: 141 MMOL/L — SIGNIFICANT CHANGE UP (ref 135–145)
SP GR SPEC: >1.03 — HIGH (ref 1–1.03)
THC UR QL: NEGATIVE — SIGNIFICANT CHANGE UP
TROPONIN T, HIGH SENSITIVITY RESULT: 15 NG/L — SIGNIFICANT CHANGE UP (ref 0–51)
TROPONIN T, HIGH SENSITIVITY RESULT: 9 NG/L — SIGNIFICANT CHANGE UP (ref 0–51)
UROBILINOGEN FLD QL: 0.2 MG/DL — SIGNIFICANT CHANGE UP (ref 0.2–1)
WBC # BLD: 8.9 K/UL — SIGNIFICANT CHANGE UP (ref 3.8–10.5)
WBC # FLD AUTO: 8.9 K/UL — SIGNIFICANT CHANGE UP (ref 3.8–10.5)

## 2024-01-17 PROCEDURE — 99291 CRITICAL CARE FIRST HOUR: CPT

## 2024-01-17 PROCEDURE — 71275 CT ANGIOGRAPHY CHEST: CPT | Mod: 26,MA

## 2024-01-17 PROCEDURE — 74174 CTA ABD&PLVS W/CONTRAST: CPT | Mod: 26,MA

## 2024-01-17 PROCEDURE — 99223 1ST HOSP IP/OBS HIGH 75: CPT | Mod: GC

## 2024-01-17 PROCEDURE — 70450 CT HEAD/BRAIN W/O DYE: CPT | Mod: 26,MA

## 2024-01-17 RX ORDER — ONDANSETRON 8 MG/1
4 TABLET, FILM COATED ORAL EVERY 8 HOURS
Refills: 0 | Status: DISCONTINUED | OUTPATIENT
Start: 2024-01-17 | End: 2024-01-18

## 2024-01-17 RX ORDER — TAMSULOSIN HYDROCHLORIDE 0.4 MG/1
1 CAPSULE ORAL
Refills: 0 | DISCHARGE

## 2024-01-17 RX ORDER — ATORVASTATIN CALCIUM 80 MG/1
40 TABLET, FILM COATED ORAL AT BEDTIME
Refills: 0 | Status: DISCONTINUED | OUTPATIENT
Start: 2024-01-17 | End: 2024-01-18

## 2024-01-17 RX ORDER — LANOLIN ALCOHOL/MO/W.PET/CERES
3 CREAM (GRAM) TOPICAL AT BEDTIME
Refills: 0 | Status: DISCONTINUED | OUTPATIENT
Start: 2024-01-17 | End: 2024-01-18

## 2024-01-17 RX ORDER — CARVEDILOL PHOSPHATE 80 MG/1
6.25 CAPSULE, EXTENDED RELEASE ORAL EVERY 12 HOURS
Refills: 0 | Status: DISCONTINUED | OUTPATIENT
Start: 2024-01-17 | End: 2024-01-18

## 2024-01-17 RX ORDER — TAMSULOSIN HYDROCHLORIDE 0.4 MG/1
0.4 CAPSULE ORAL AT BEDTIME
Refills: 0 | Status: DISCONTINUED | OUTPATIENT
Start: 2024-01-17 | End: 2024-01-18

## 2024-01-17 RX ORDER — ASPIRIN/CALCIUM CARB/MAGNESIUM 324 MG
81 TABLET ORAL DAILY
Refills: 0 | Status: DISCONTINUED | OUTPATIENT
Start: 2024-01-17 | End: 2024-01-18

## 2024-01-17 RX ORDER — ACETAMINOPHEN 500 MG
650 TABLET ORAL EVERY 6 HOURS
Refills: 0 | Status: DISCONTINUED | OUTPATIENT
Start: 2024-01-17 | End: 2024-01-18

## 2024-01-17 RX ORDER — LOSARTAN POTASSIUM 100 MG/1
25 TABLET, FILM COATED ORAL DAILY
Refills: 0 | Status: DISCONTINUED | OUTPATIENT
Start: 2024-01-17 | End: 2024-01-18

## 2024-01-17 NOTE — ED ADULT NURSE NOTE - OBJECTIVE STATEMENT
75 y/o male came to the ED with complaints of seizure activity. Was at home and had am unwitnessed period of unresponsiveness, bit his tongue and had urinary incontinence. Code stroke was called upon arrival to ED. Denies CP, SOB, N, V, D, headache, dizziness, numbness, tingling, weakness, dysarthria, dysphagia, vision changes.

## 2024-01-17 NOTE — H&P ADULT - NSICDXPASTMEDICALHX_GEN_ALL_CORE_FT
PAST MEDICAL HISTORY:  AI (aortic insufficiency)     Aneurysm of ascending aorta being by PMD 10 years ago size stays same as per patient    Colon polyp     Essential hypertension     HLD (hyperlipidemia)     HTN (hypertension)     Hyperlipidemia, unspecified hyperlipidemia type     LVE (left ventricular enlargement)     Thoracic aortic aneurysm (TAA)

## 2024-01-17 NOTE — H&P ADULT - ATTENDING COMMENTS
Pt was seen and examined during key portion of E/M service. Case discussed with resident. H&P reviewed and edited where appropriate. Other than the following, I agree with the above history, exam, assessment, and plan.  74M h/o HTN, HLD, nonobstructive CAD, AI and TAA s/p AVR and ascending aorta repair 2018, traumatic subdural hematoma x2 (6/2023), syncope (10/2023), self-tapered antiepileptics 2/2 GI intolerance, pw unwitnessed syncope vs seizure.  eeg. hold off AED. orthostats. may need holter monitoring. PT eval

## 2024-01-17 NOTE — H&P ADULT - NSHPPHYSICALEXAM_GEN_ALL_CORE
Vital Signs Last 24 Hrs  T(C): 37.1 (18 Jan 2024 00:00), Max: 37.1 (18 Jan 2024 00:00)  T(F): 98.8 (18 Jan 2024 00:00), Max: 98.8 (18 Jan 2024 00:00)  HR: 76 (18 Jan 2024 00:00) (76 - 83)  BP: 114/75 (18 Jan 2024 00:00) (114/75 - 177/82)  BP(mean): 114 (17 Jan 2024 21:00) (114 - 114)  RR: 19 (18 Jan 2024 00:00) (18 - 19)  SpO2: 98% (18 Jan 2024 00:00) (94% - 98%)    Parameters below as of 18 Jan 2024 00:00  Patient On (Oxygen Delivery Method): room air        PHYSICAL EXAM:  GENERAL: NAD, lying in bed comfortably  HEAD:  Atraumatic, Normocephalic  EYES: EOMI, conjunctiva and sclera clear  ENT: Moist mucous membranes  NECK: Supple  CHEST/LUNG: Clear to auscultation bilaterally; No rales, rhonchi, wheezing, or rubs. Unlabored respirations  HEART: Regular rate and rhythm; No murmurs, rubs, or gallops  ABDOMEN: Bowel sounds present; Soft, Nontender, Nondistended. No hepatomegally  EXTREMITIES:  2+ Peripheral Pulses, brisk capillary refill. No clubbing, cyanosis, or edema  NERVOUS SYSTEM:  Alert & Oriented X3, speech clear. No deficits   MSK: FROM all 4 extremities, full and equal strength  SKIN: No rashes or lesions

## 2024-01-17 NOTE — H&P ADULT - HISTORY OF PRESENT ILLNESS
74M h/o HTN, HLD, CAD, AI and TAA s/p AVR and ascending aorta repair 2018, intracranial bleed found after falling down stairs (6/2023) presenting after being found face down on the floor at 4pm. Event was unwitnessed. Pt was watching TV when it occurred and does not remember event. Endorsed feeling confused after waking up. Also endorsed biting his tongue and urinary incontinence. Pt had 2 similar prior instances of losing consciousness (6/23 and 10/23). After fall in 6/23, bleed remained stable and pt was started on AED but self dc'd 2/2 GI distress. During event in 10/23, pt found to have NSTEMI likely in context of stress cardiomyopathy. Cath negative, pt follows with cardiologist; unlikely cardiac etiology to the episodes. Pt also reports having done MR brain done recently which was wnl.  74M h/o HTN, HLD, CAD, AI and TAA s/p AVR and ascending aorta repair 2018, acute on chronic subdural hematoma found after falling down stairs (6/2023) presenting after being found face down on the floor at 4pm. Event was unwitnessed, found by his wife. Pt was watching TV when it occurred and does not remember event. Endorsed feeling confused after waking up. Also endorsed biting his tongue and urinary incontinence. Pt had 2 similar prior instances of losing consciousness (6/23 and 10/23). After fall in 6/23, bleed remained stable and pt was started on AED but self dc'd 2/2 GI distress. During event in 10/23, pt found to have NSTEMI likely in context of stress cardiomyopathy. Cath negative, pt follows with cardiologist; unlikely cardiac etiology to the episodes. Pt also reports having done MR brain and EEG done recently which was wnl.  74M h/o HTN, HLD, nonobstructive CAD, AI and TAA s/p AVR and ascending aorta repair 2018, traumatic subdural hematoma x2 (6/2023), syncope (10/2023), self-tapered antiepileptics 2/2 GI intolerance, presenting after being found face down on the floor at 4pm.     Event was unwitnessed, found by his wife. Pt was watching TV when it occurred and does not remember event. Endorsed feeling confused after waking up. Also endorsed biting his tongue and urinary incontinence. Pt had 2 similar prior instances of losing consciousness (6/23 and 10/23). After fall in 6/23, bleed remained stable and pt was started on AED but self dc'd 2/2 GI distress. During event in 10/23, pt found to have NSTEMI likely in context of stress cardiomyopathy. LHCath negative, showing nonobstructive disease. pt follows with cardiologist; unlikely cardiac etiology to the episodes. Pt also reports having done MR brain and EEG done recently which was wnl.

## 2024-01-17 NOTE — H&P ADULT - NSHPSOCIALHISTORY_GEN_ALL_CORE
Lives at home with wife. Ambulates by self. Prior smoking hx 50 years 0.5-1 pack/d but quit 5y ago. Denies alcohol or other drug use.

## 2024-01-17 NOTE — ED PROVIDER NOTE - PROGRESS NOTE DETAILS
work smoking hx ascending thoracic aorta repair by dr. 

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
## 2024-01-17 NOTE — H&P ADULT - ASSESSMENT
74M h/o HTN, HLD, CAD, AI and TAA s/p AVR and ascending aorta repair 2018, intracranial bleed found after falling down stairs (6/2023) presenting after being found face down on the floor at 4pm. Event associated with urinary incontinence, tongue bite, and post ictal state. Had 2 prior similar events. Cardiac workup done prior unremarkable. 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

## 2024-01-17 NOTE — H&P ADULT - PROBLEM SELECTOR PLAN 1
- found face down with tongue bite, urinary incontinence, post ictal state; 2 prior similar episodes in the last 7 mo  - unlikely metabolic given labs wnl  - unlikely medication induced given pt's home medications  - unlikely infectious given afebrile, neg RVP  - prior extensive cardiac w/u: cardiac MRI without evidence of scarring, cath without obstructive dz, TTE EF 46%  - pt reports having done brain MRI and EEG outpt and was wnl  - drug screen negative  - CTH wnl  - CT C/A/P: Unchanged aneurysmal dilatation of the ascending aorta status post repair. No aortic dissection.  - neuro c/s,  recs appreciated  - vEEG - found face down with tongue bite, urinary incontinence, post ictal state; 2 prior similar episodes in the last 7 mo  - unlikely metabolic given labs wnl  - unlikely medication induced given pt's home medications  - unlikely infectious given afebrile, neg RVP  - prior extensive cardiac w/u: cardiac MRI without evidence of scarring, cath without obstructive dz, TTE EF 46%  - pt reports having done brain MRI and EEG outpt and was wnl  - drug screen negative  - CTH wnl  - CT C/A/P: Unchanged aneurysmal dilatation of the ascending aorta status post repair. No aortic dissection.  - neuro c/s,  recs appreciated  - vEEG  - monitor on tele - found face down with tongue bite, urinary incontinence, post ictal state; 2 prior similar episodes in the last 7 mo  - unlikely metabolic given labs wnl  - unlikely medication induced given pt's home medications  - unlikely infectious given afebrile, neg RVP  - prior extensive cardiac w/u: cardiac MRI without evidence of scarring, cath without obstructive dz, TTE EF 46%  - pt reports having done brain MRI and EEG outpt and was wnl  - drug screen negative  - CTH wnl  - CT C/A/P: Unchanged aneurysmal dilatation of the ascending aorta status post repair. No aortic dissection.  - neuro c/s,  recs appreciated  - vEEG  - monitor on tele  - orthostatics  - consider loop/holter monitor outpt - found face down with tongue bite, urinary incontinence, post ictal state; 2 prior similar episodes in the last 7 mo  - unlikely metabolic given labs wnl  - unlikely medication induced given pt's home medications  - unlikely infectious given afebrile, neg RVP  - prior extensive cardiac w/u: cardiac MRI without evidence of scarring, cath without obstructive dz, TTE EF 46%  - pt reports having done brain MRI and EEG outpt and was wnl  - drug screen negative  - CTH wnl  - CT C/A/P: Unchanged aneurysmal dilatation of the ascending aorta status post repair. No aortic dissection.  - neuro c/s,  recs appreciated  - vEEG  - f/u regarding starting AED other than keppra given pt intolerance   - monitor on tele  - orthostatics  - consider loop/holter monitor outpt - found face down with tongue bite, urinary incontinence, post ictal state; 2 prior similar episodes in the last 7 mo  - unlikely metabolic given labs wnl  - unlikely medication induced given pt's home medications  - unlikely infectious given afebrile, neg RVP  - prior extensive cardiac w/u: cardiac MRI without evidence of scarring, cath without obstructive dz, TTE EF 46%  - pt reports having done brain MRI and EEG outpt and was wnl  - drug screen negative  - CTH wnl  - CT C/A/P: Unchanged aneurysmal dilatation of the ascending aorta status post repair. No aortic dissection.  - neuro c/s,  recs appreciated  - vEEG  - pt started on keppra in 6/2023 as ppx iso bleed; would hold off on starting AED currently prior to confirmed seizure diagnosis per neuro  - monitor on tele  - orthostatics  - consider loop/holter monitor outpt

## 2024-01-17 NOTE — ED PROVIDER NOTE - OBJECTIVE STATEMENT
73M w/ hx of HTN, HLD, nonobstructive CAD from 2016 (30% RCA) TAA and AI s/p AVR and ascending aorta repair 2018 presents to the Er s/p episode of unresponsiveness ?seizure had tongue biting and urinary incontience, pt per son was post ictal no one witnessed the episode. pt bibems was made a code stroke, had no focal findings code stroke canceled

## 2024-01-17 NOTE — H&P ADULT - NSICDXFAMILYHX_GEN_ALL_CORE_FT
FAMILY HISTORY:  No pertinent family history in first degree relatives    Father  Still living? Unknown  FH: leukemia, Age at diagnosis: Age Unknown    Mother  Still living? Unknown  FH: myocardial infarction, Age at diagnosis: Age Unknown    Sibling  Still living? Unknown  FH: colon cancer, Age at diagnosis: Age Unknown

## 2024-01-17 NOTE — H&P ADULT - NSICDXPASTSURGICALHX_GEN_ALL_CORE_FT
PAST SURGICAL HISTORY:  H/O ascending aorta repair     No significant past surgical history     S/P AVR

## 2024-01-17 NOTE — ED PROVIDER NOTE - PHYSICAL EXAMINATION
Const: no acute distress, Well-developed, Eyes: no conjunctival injection and no scleral icterus ENMT: Moist mucus membranes, mild bleeding from the tongue, CVS: +S1/S2, radial pulse 2+ bilaterally RESP: Unlabored respiratory effort, Clear to auscultation bilaterally GI: Nontender/Nondistended soft abdomen, no CVA tenderness MSK: Extremities w/o deformity or ttp, Psych: Awake, Alert, & Orientedx3;  Appropriate mood and affect, cooperative Neuro: 5/5 bilateral elbow flexion/extension, 5/5 bilateral wrist flexion/extension, 5/5 bilateral hand , 5/5 bilateral hip flexion/extension, 5/5 bilateral knee flexion/extension, 5/5 bilateral dorsiflexion/plantarflexion, intact sensation in the bilateral arms and legs to light touch, normal finger to nose bilaterally w/ no dysmetria EOMI, CN2-12 intact, pupils are 4 mm and reactive bilaterally

## 2024-01-17 NOTE — CONSULT NOTE ADULT - ASSESSMENT
Seizure  Syncope     Impression: Seizure like activity vs syncope of unclear etiology. R/o toxic metabolic vs structural vs cardiac.     Recommendations:   [] F/u CTH  [] check UA, Utox, ETOH level, TSH, T3/T4,  vitamin B1, B6, B12, folate, lactate, creatnine kinase, ammonia, Cu, Zn, Vit D-25 OH  [] vEEG  [] MRI brain w/wo   [] Obtain collateral from wife about outpatient neurologist   [] Cardiac work up per ED   [] Rest of work up per primary team and ED    Case to be seen by general neurology attending  Seizure  Syncope     Impression: Seizure like activity vs syncope of unclear etiology. R/o toxic metabolic vs structural vs cardiac.     Recommendations:   [] F/u CTH  [] Toxic metabolic work up   [] vEEG  [] Cardiac work up per ED   [] Rest of work up per primary team and ED  [] Patient sees Dr. Josse Miramontes, [facility]  (to be followed by partner on call)    Case d/w Dr. Heller. Case to be seen by Dr. Heller.

## 2024-01-18 ENCOUNTER — TRANSCRIPTION ENCOUNTER (OUTPATIENT)
Age: 74
End: 2024-01-18

## 2024-01-18 VITALS
RESPIRATION RATE: 18 BRPM | HEART RATE: 64 BPM | DIASTOLIC BLOOD PRESSURE: 77 MMHG | SYSTOLIC BLOOD PRESSURE: 138 MMHG | OXYGEN SATURATION: 97 % | TEMPERATURE: 98 F

## 2024-01-18 DIAGNOSIS — Z29.9 ENCOUNTER FOR PROPHYLACTIC MEASURES, UNSPECIFIED: ICD-10-CM

## 2024-01-18 DIAGNOSIS — E78.5 HYPERLIPIDEMIA, UNSPECIFIED: ICD-10-CM

## 2024-01-18 DIAGNOSIS — I25.10 ATHEROSCLEROTIC HEART DISEASE OF NATIVE CORONARY ARTERY WITHOUT ANGINA PECTORIS: ICD-10-CM

## 2024-01-18 DIAGNOSIS — I10 ESSENTIAL (PRIMARY) HYPERTENSION: ICD-10-CM

## 2024-01-18 DIAGNOSIS — R56.9 UNSPECIFIED CONVULSIONS: ICD-10-CM

## 2024-01-18 LAB
ANION GAP SERPL CALC-SCNC: 10 MMOL/L — SIGNIFICANT CHANGE UP (ref 5–17)
BUN SERPL-MCNC: 20 MG/DL — SIGNIFICANT CHANGE UP (ref 7–23)
CALCIUM SERPL-MCNC: 9 MG/DL — SIGNIFICANT CHANGE UP (ref 8.4–10.5)
CHLORIDE SERPL-SCNC: 101 MMOL/L — SIGNIFICANT CHANGE UP (ref 96–108)
CO2 SERPL-SCNC: 26 MMOL/L — SIGNIFICANT CHANGE UP (ref 22–31)
CREAT SERPL-MCNC: 0.77 MG/DL — SIGNIFICANT CHANGE UP (ref 0.5–1.3)
EGFR: 93 ML/MIN/1.73M2 — SIGNIFICANT CHANGE UP
GLUCOSE SERPL-MCNC: 201 MG/DL — HIGH (ref 70–99)
HCT VFR BLD CALC: 43.1 % — SIGNIFICANT CHANGE UP (ref 39–50)
HGB BLD-MCNC: 14.4 G/DL — SIGNIFICANT CHANGE UP (ref 13–17)
MAGNESIUM SERPL-MCNC: 1.9 MG/DL — SIGNIFICANT CHANGE UP (ref 1.6–2.6)
MCHC RBC-ENTMCNC: 29.6 PG — SIGNIFICANT CHANGE UP (ref 27–34)
MCHC RBC-ENTMCNC: 33.4 GM/DL — SIGNIFICANT CHANGE UP (ref 32–36)
MCV RBC AUTO: 88.5 FL — SIGNIFICANT CHANGE UP (ref 80–100)
NRBC # BLD: 0 /100 WBCS — SIGNIFICANT CHANGE UP (ref 0–0)
PHOSPHATE SERPL-MCNC: 3.2 MG/DL — SIGNIFICANT CHANGE UP (ref 2.5–4.5)
PLATELET # BLD AUTO: 180 K/UL — SIGNIFICANT CHANGE UP (ref 150–400)
POTASSIUM SERPL-MCNC: 4.6 MMOL/L — SIGNIFICANT CHANGE UP (ref 3.5–5.3)
POTASSIUM SERPL-SCNC: 4.6 MMOL/L — SIGNIFICANT CHANGE UP (ref 3.5–5.3)
RBC # BLD: 4.87 M/UL — SIGNIFICANT CHANGE UP (ref 4.2–5.8)
RBC # FLD: 12.9 % — SIGNIFICANT CHANGE UP (ref 10.3–14.5)
SODIUM SERPL-SCNC: 137 MMOL/L — SIGNIFICANT CHANGE UP (ref 135–145)
WBC # BLD: 10.75 K/UL — HIGH (ref 3.8–10.5)
WBC # FLD AUTO: 10.75 K/UL — HIGH (ref 3.8–10.5)

## 2024-01-18 PROCEDURE — 84295 ASSAY OF SERUM SODIUM: CPT

## 2024-01-18 PROCEDURE — 80048 BASIC METABOLIC PNL TOTAL CA: CPT

## 2024-01-18 PROCEDURE — 74174 CTA ABD&PLVS W/CONTRAST: CPT | Mod: MA

## 2024-01-18 PROCEDURE — 36415 COLL VENOUS BLD VENIPUNCTURE: CPT

## 2024-01-18 PROCEDURE — 71275 CT ANGIOGRAPHY CHEST: CPT | Mod: MA

## 2024-01-18 PROCEDURE — 70450 CT HEAD/BRAIN W/O DYE: CPT | Mod: MA

## 2024-01-18 PROCEDURE — 84484 ASSAY OF TROPONIN QUANT: CPT

## 2024-01-18 PROCEDURE — 85018 HEMOGLOBIN: CPT

## 2024-01-18 PROCEDURE — 85014 HEMATOCRIT: CPT

## 2024-01-18 PROCEDURE — 80053 COMPREHEN METABOLIC PANEL: CPT

## 2024-01-18 PROCEDURE — 85025 COMPLETE CBC W/AUTO DIFF WBC: CPT

## 2024-01-18 PROCEDURE — 99239 HOSP IP/OBS DSCHRG MGMT >30: CPT

## 2024-01-18 PROCEDURE — 85730 THROMBOPLASTIN TIME PARTIAL: CPT

## 2024-01-18 PROCEDURE — 82330 ASSAY OF CALCIUM: CPT

## 2024-01-18 PROCEDURE — 85610 PROTHROMBIN TIME: CPT

## 2024-01-18 PROCEDURE — 82435 ASSAY OF BLOOD CHLORIDE: CPT

## 2024-01-18 PROCEDURE — 82947 ASSAY GLUCOSE BLOOD QUANT: CPT

## 2024-01-18 PROCEDURE — 84100 ASSAY OF PHOSPHORUS: CPT

## 2024-01-18 PROCEDURE — 80307 DRUG TEST PRSMV CHEM ANLYZR: CPT

## 2024-01-18 PROCEDURE — 85027 COMPLETE CBC AUTOMATED: CPT

## 2024-01-18 PROCEDURE — 83735 ASSAY OF MAGNESIUM: CPT

## 2024-01-18 PROCEDURE — 84132 ASSAY OF SERUM POTASSIUM: CPT

## 2024-01-18 PROCEDURE — 99291 CRITICAL CARE FIRST HOUR: CPT

## 2024-01-18 PROCEDURE — 81003 URINALYSIS AUTO W/O SCOPE: CPT

## 2024-01-18 PROCEDURE — 82803 BLOOD GASES ANY COMBINATION: CPT

## 2024-01-18 PROCEDURE — 87637 SARSCOV2&INF A&B&RSV AMP PRB: CPT

## 2024-01-18 PROCEDURE — 83605 ASSAY OF LACTIC ACID: CPT

## 2024-01-18 RX ORDER — LACOSAMIDE 50 MG/1
100 TABLET ORAL
Refills: 0 | Status: DISCONTINUED | OUTPATIENT
Start: 2024-01-18 | End: 2024-01-18

## 2024-01-18 RX ORDER — ENOXAPARIN SODIUM 100 MG/ML
40 INJECTION SUBCUTANEOUS EVERY 24 HOURS
Refills: 0 | Status: DISCONTINUED | OUTPATIENT
Start: 2024-01-18 | End: 2024-01-18

## 2024-01-18 RX ORDER — LACOSAMIDE 50 MG/1
1 TABLET ORAL
Qty: 60 | Refills: 0
Start: 2024-01-18 | End: 2024-02-16

## 2024-01-18 RX ADMIN — CARVEDILOL PHOSPHATE 6.25 MILLIGRAM(S): 80 CAPSULE, EXTENDED RELEASE ORAL at 06:37

## 2024-01-18 RX ADMIN — CARVEDILOL PHOSPHATE 6.25 MILLIGRAM(S): 80 CAPSULE, EXTENDED RELEASE ORAL at 00:31

## 2024-01-18 RX ADMIN — Medication 81 MILLIGRAM(S): at 11:11

## 2024-01-18 RX ADMIN — LOSARTAN POTASSIUM 25 MILLIGRAM(S): 100 TABLET, FILM COATED ORAL at 06:36

## 2024-01-18 RX ADMIN — ENOXAPARIN SODIUM 40 MILLIGRAM(S): 100 INJECTION SUBCUTANEOUS at 06:36

## 2024-01-18 NOTE — DISCHARGE NOTE PROVIDER - PROVIDER TOKENS
PROVIDER:[TOKEN:[[id number]],FOLLOWUP:[1 week]],PROVIDER:[TOKEN:[[id number]],FOLLOWUP:[1 week]]

## 2024-01-18 NOTE — DISCHARGE NOTE PROVIDER - CARE PROVIDERS DIRECT ADDRESSES
,DirectAddress_Unknown,[email or web address]

## 2024-01-18 NOTE — DISCHARGE NOTE PROVIDER - CARE PROVIDER_API CALL
Josse Miramontes  Neurology  [address], [address]  [place] [address]  Phone: [phone]  Fax: [phone]  Follow Up Time: 1 week    Jonel Mitchell  [facility]  [address], [address]  [place] [address]  Phone: [phone]  Fax: [phone]  Follow Up Time: 1 week
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
## 2024-01-18 NOTE — DISCHARGE NOTE PROVIDER - ATTENDING DISCHARGE PHYSICAL EXAMINATION:
Seen at bedside with wife Maria Esther.  Pt reports feeling well, asymptomatic. Ambulating. Tolerating PO.  PE well appearing m, nad, lungs cta, abd sntnd, legs without edema, heart rrr.  d/w pt's cardiologist Dr Ferrer and given extensive outpt negative workup, can follow up outpt.  Appreciate neurology input - started on lacosimide and to c/w outpatient.  Stable for DC home today.

## 2024-01-18 NOTE — DISCHARGE NOTE NURSING/CASE MANAGEMENT/SOCIAL WORK - NSDCPEFALRISK_GEN_ALL_CORE
For information on Fall & Injury Prevention, visit: [email or web address] OR  [email or web address] OR  https://www.cdc.gov/steadi/patient.html

## 2024-01-18 NOTE — DISCHARGE NOTE PROVIDER - NSDCCPCAREPLAN_GEN_ALL_CORE_FT
PRINCIPAL DISCHARGE DIAGNOSIS  Diagnosis: Unresponsive  Assessment and Plan of Treatment: You presented to the ED after being found unresposive with questionable seizure likely activity. CT Head was done with no acute findings. Please Follow up with Neurology Dr. Josse Miramontes for outpatient EEG within 1 week of discharge.      SECONDARY DISCHARGE DIAGNOSES  Diagnosis: HTN (hypertension)  Assessment and Plan of Treatment: Continue with your blood pressure medications; eat a heart healthy diet with low salt diet; exercise regularly (consult with your physician or cardiologist first); maintain a heart healthy weight; if you smoke - quit (A resource to help you stop smoking is the [facility] for Tobacco Control – phone number [phone].); include healthy ways to manage stress. Continue to follow with your primary care physician or cardiologist.    Diagnosis: HLD (hyperlipidemia)  Assessment and Plan of Treatment: Low salt, low fat, low cholesterol, diabetic diet if appropriate  Continue medication as prescribed  Exercise, increase your activity level  Pt. verbalized an understanding of all instructions.    Diagnosis: CAD (coronary artery disease)  Assessment and Plan of Treatment: You have history of coronary artery disease. continue to take all medications as prescribed. Follow-up with your PCP within 1 week of discharge.    Diagnosis: Seizure-like activity  Assessment and Plan of Treatment: You presented to ED with seizure like activity.   Continue your anti-seizure medications as prescribed. LACOSAMIDE 100 MG TWICE DAILY   Follow up with your doctor for blood work monitoring of medication levels  Notify your doctor if you experience increased/worsening seizure activity, body aches, weakness, lack of coordination, feeling irritable, drowsiness, dizziness  Pt. verbalized an understanding of all instructions.

## 2024-01-18 NOTE — DISCHARGE NOTE NURSING/CASE MANAGEMENT/SOCIAL WORK - PATIENT PORTAL LINK FT
You can access the FollowMyHealth Patient Portal offered by [facility] by registering at the following website: [email or web address]. By joining [employer]’s FollowMyHealth portal, you will also be able to view your health information using other applications (apps) compatible with our system.

## 2024-01-18 NOTE — DISCHARGE NOTE NURSING/CASE MANAGEMENT/SOCIAL WORK - NSDCFUADDAPPT_GEN_ALL_CORE_FT
APPTS ARE READY TO BE MADE: [x ] YES    Best Family or Patient Contact (if needed):    Additional Information about above appointments (if needed):    1: Neurology follow-up for EEG   2:   3:     Other comments or requests:

## 2024-01-18 NOTE — DISCHARGE NOTE PROVIDER - NSDCFUADDAPPT_GEN_ALL_CORE_FT
APPTS ARE READY TO BE MADE: [x ] YES    Best Family or Patient Contact (if needed):    Additional Information about above appointments (if needed):    1: Neurology follow-up for EEG   2:   3:     Other comments or requests:    APPTS ARE READY TO BE MADE: [x ] YES    Best Family or Patient Contact (if needed):    Additional Information about above appointments (if needed):    1: Neurology follow-up for EEG   2:   3:     Other comments or requests:   Pt is awaiting callback from Dr. Miramontes office for scheduling.  Prior appt felisha with Dr. Mitchell on 1/30 at 3:30pm.
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
## 2024-01-18 NOTE — DISCHARGE NOTE PROVIDER - NSDCMRMEDTOKEN_GEN_ALL_CORE_FT
aspirin 81 mg oral delayed release tablet: 1 tab(s) orally once a day  atorvastatin 40 mg oral tablet: 1 tab(s) orally once a day (at bedtime)  carvedilol 6.25 mg oral tablet: 1 tab(s) orally every 12 hours  losartan 25 mg oral tablet: 1 tab(s) orally once a day  tamsulosin 0.4 mg oral capsule: 1 cap(s) orally once a day   aspirin 81 mg oral delayed release tablet: 1 tab(s) orally once a day  atorvastatin 40 mg oral tablet: 1 tab(s) orally once a day (at bedtime)  carvedilol 6.25 mg oral tablet: 1 tab(s) orally every 12 hours  lacosamide 100 mg oral tablet: 1 tab(s) orally 2 times a day MDD: 2 tabs/daily  losartan 25 mg oral tablet: 1 tab(s) orally once a day  tamsulosin 0.4 mg oral capsule: 1 cap(s) orally once a day

## 2024-01-18 NOTE — DISCHARGE NOTE PROVIDER - HOSPITAL COURSE
HPI:  74M h/o HTN, HLD, nonobstructive CAD, AI and TAA s/p AVR and ascending aorta repair 2018, traumatic subdural hematoma x2 (6/2023), syncope (10/2023), self-tapered antiepileptics 2/2 GI intolerance, presenting after being found face down on the floor at 4pm.     Event was unwitnessed, found by his wife. Pt was watching TV when it occurred and does not remember event. Endorsed feeling confused after waking up. Also endorsed biting his tongue and urinary incontinence. Pt had 2 similar prior instances of losing consciousness (6/23 and 10/23). After fall in 6/23, bleed remained stable and pt was started on AED but self dc'd 2/2 GI distress. During event in 10/23, pt found to have NSTEMI likely in context of stress cardiomyopathy. LHCath negative, showing nonobstructive disease. pt follows with cardiologist; unlikely cardiac etiology to the episodes. Pt also reports having done MR brain and EEG done recently which was wnl.  (17 Jan 2024 23:34)    Hospital Course:      Important Medication Changes and Reason:  Started on Lacosamide 100 mg twice daily     Active or Pending Issues Requiring Follow-up:  EEG ? Seizures   Advanced Directives:   [ x] Full code  [ ] DNR  [ ] Hospice    Discharge Diagnoses:  AI (aortic insufficiency)   Aneurysm of ascending aorta being by PMD 10 years ago size stays same as per patient  Colon polyp   Essential hypertension   HLD (hyperlipidemia)   HTN (hypertension)   Hyperlipidemia, unspecified hyperlipidemia type   LVE (left ventricular enlargement)   Thoracic aortic aneurysm (TAA).        74M h/o HTN, HLD, nonobstructive CAD, AI and TAA s/p AVR and ascending aorta repair 2018, traumatic subdural hematoma x2 (6/2023), syncope (10/2023), self-tapered antiepileptics 2/2 GI intolerance, presenting after being found face down on the floor at 4pm.     Event was unwitnessed, found by his wife. Pt was watching TV when it occurred and does not remember event. Endorsed feeling confused after waking up. Also endorsed biting his tongue and urinary incontinence. Pt had 2 similar prior instances of losing consciousness (6/23 and 10/23). After fall in 6/23, bleed remained stable and pt was started on AED but self dc'd 2/2 GI distress. During event in 10/23, pt found to have NSTEMI likely in context of stress cardiomyopathy. LHCath negative, showing nonobstructive disease. pt follows with cardiologist; unlikely cardiac etiology to the episodes. Pt also reports having done MR brain and EEG done recently which was wnl.  (17 Jan 2024 23:34)    Hospital Course: Patient presented to ED with Syncopal episode questionable seizure like activity. Neurology evaluated-> CT Head ->IMPRESSION: No CT evidence of acute intracranial hemorrhage, brain edema, or mass effect. CT Chest/A/P-> IMPRESSION: Unchanged aneurysmal dilatation of the ascending aorta status post repair. No aortic dissection.  No acute intrathoracic or intra-abdominal/pelvic findings. Recs cardiology work-up. Patient seen and evaluated by cardiology, stated on lacosamide 100 mg BID. Recommends outpatient EEG and follow-up with Dr. Josse Miramontes ( Neurology). Patient stable for discharge per Dr. Muhammad with neurology follow-up. Rx sent for Vimpat to [facility].           Important Medication Changes and Reason:  Started on Lacosamide 100 mg twice daily     Active or Pending Issues Requiring Follow-up:  EEG ? Seizures   Advanced Directives:   [ x] Full code  [ ] DNR  [ ] Hospice    Discharge Diagnoses:  AI (aortic insufficiency)   Aneurysm of ascending aorta being by PMD 10 years ago size stays same as per patient  Colon polyp   Essential hypertension   HLD (hyperlipidemia)   HTN (hypertension)   Hyperlipidemia, unspecified hyperlipidemia type   LVE (left ventricular enlargement)   Thoracic aortic aneurysm (TAA).        74M h/o HTN, HLD, nonobstructive CAD, AI and TAA s/p AVR and ascending aorta repair 2018, traumatic subdural hematoma x2 (6/2023), syncope (10/2023), self-tapered antiepileptics 2/2 GI intolerance, presenting after being found face down on the floor at 4pm.     Event was unwitnessed, found by his wife. Pt was watching TV when it occurred and does not remember event. Endorsed feeling confused after waking up. Also endorsed biting his tongue and urinary incontinence. Pt had 2 similar prior instances of losing consciousness (6/23 and 10/23). After fall in 6/23, bleed remained stable and pt was started on AED but self dc'd 2/2 GI distress. During event in 10/23, pt found to have NSTEMI likely in context of stress cardiomyopathy. LHCath negative, showing nonobstructive disease. pt follows with cardiologist; unlikely cardiac etiology to the episodes. Pt also reports having done MR brain and EEG done recently which was wnl.  (17 Jan 2024 23:34)    Hospital Course: Patient presented to ED with Syncopal episode questionable seizure like activity. Neurology evaluated-> CT Head ->IMPRESSION: No CT evidence of acute intracranial hemorrhage, brain edema, or mass effect. CT Chest/A/P-> IMPRESSION: Unchanged aneurysmal dilatation of the ascending aorta status post repair. No aortic dissection.  No acute intrathoracic or intra-abdominal/pelvic findings. Recs cardiology work-up. Orthostatics neg. Patient seen and evaluated by cardiology, stated on lacosamide 100 mg BID. Leukocytosis mild likely reactive. Recommends outpatient EEG and follow-up with Dr. Josse Miramontes ( Neurology). Rx sent for Vimpat to [facility]. D/w pt's cardiologist Dr Ferrer and given extensive workup unlikely syncope. Pt ambulating and stable for dc w/ outpt neuro follow up.        Important Medication Changes and Reason:  Started on Lacosamide 100 mg twice daily     Active or Pending Issues Requiring Follow-up:  EEG for ? Seizures   Advanced Directives:   [ x] Full code  [ ] DNR  [ ] Hospice    Discharge Diagnoses:  epilepsy

## 2024-01-19 ENCOUNTER — TRANSCRIPTION ENCOUNTER (OUTPATIENT)
Age: 74
End: 2024-01-19

## 2024-01-22 ENCOUNTER — TRANSCRIPTION ENCOUNTER (OUTPATIENT)
Age: 74
End: 2024-01-22

## 2024-01-30 ENCOUNTER — APPOINTMENT (OUTPATIENT)
Dept: INTERNAL MEDICINE | Facility: CLINIC | Age: 74
End: 2024-01-30
Payer: COMMERCIAL

## 2024-01-30 VITALS
DIASTOLIC BLOOD PRESSURE: 75 MMHG | WEIGHT: 229 LBS | HEIGHT: 72 IN | SYSTOLIC BLOOD PRESSURE: 149 MMHG | BODY MASS INDEX: 31.02 KG/M2 | HEART RATE: 54 BPM

## 2024-01-30 DIAGNOSIS — I42.9 CARDIOMYOPATHY, UNSPECIFIED: ICD-10-CM

## 2024-01-30 DIAGNOSIS — G40.909 EPILEPSY, UNSPECIFIED, NOT INTRACTABLE, W/OUT STATUS EPILEPTICUS: ICD-10-CM

## 2024-01-30 DIAGNOSIS — I51.7 CARDIOMEGALY: ICD-10-CM

## 2024-01-30 PROCEDURE — 99495 TRANSJ CARE MGMT MOD F2F 14D: CPT

## 2024-01-30 PROCEDURE — G2211 COMPLEX E/M VISIT ADD ON: CPT | Mod: NC

## 2024-01-30 RX ORDER — TAMSULOSIN HYDROCHLORIDE 0.4 MG/1
0.4 CAPSULE ORAL
Qty: 90 | Refills: 3 | Status: ACTIVE | COMMUNITY
Start: 2023-03-09 | End: 1900-01-01

## 2024-01-31 PROBLEM — I42.9 CARDIOMYOPATHY, UNSPECIFIED TYPE: Status: ACTIVE | Noted: 2023-10-13

## 2024-01-31 PROBLEM — G40.909 SEIZURE DISORDER: Status: ACTIVE | Noted: 2024-01-31

## 2024-01-31 NOTE — PHYSICAL EXAM
[Normal] : no respiratory distress, lungs were clear to auscultation bilaterally and no accessory muscle use [Normal Rate] : normal rate  [Regular Rhythm] : with a regular rhythm [Normal S1, S2] : normal S1 and S2 [Soft] : abdomen soft [Non Tender] : non-tender [Non-distended] : non-distended [Normal Posterior Cervical Nodes] : no posterior cervical lymphadenopathy [Normal Anterior Cervical Nodes] : no anterior cervical lymphadenopathy [No Focal Deficits] : no focal deficits [Normal Affect] : the affect was normal [Normal Mood] : the mood was normal [de-identified] : +murmur  [87090 - Moderate Complexity requires multiple possible diagnoses and/or the management options, moderate complexity of the medical data (tests, etc.) to be reviewed, and moderate risk of significant complications, morbidity, and/or mortality as well as co] : Moderate Complexity

## 2024-01-31 NOTE — HISTORY OF PRESENT ILLNESS
[Post-hospitalization from ___ Hospital] : Post-hospitalization from [unfilled] Hospital [Admitted on: ___] : The patient was admitted on [unfilled] [Discharged on ___] : discharged on [unfilled] [FreeTextEntry2] : Today, overall doing well.  No acute issues.  No prior or further episodes since starting medication.  Seen by neurologist, Dr. Josse Valdez yesterday.  No change in plan as per patient.  No notes to review.  States has been compliant with [facility]  Hospital Course  74M h/o HTN, HLD, nonobstructive CAD, AI and TAA s/p AVR and ascending aorta repair 2018, traumatic subdural hematoma x2 (6/2023), syncope (10/2023), self-tapered antiepileptics 2/2 GI intolerance, presenting after being found face down on the floor at 4pm.  Event was unwitnessed, found by his wife. Pt was watching TV when it occurred and does not remember event. Endorsed feeling confused after waking up. Also endorsed biting his tongue and urinary incontinence. Pt had 2 similar prior instances of losing consciousness (6/23 and 10/23). After fall in 6/23, bleed remained stable and pt was started on AED but self dc'd 2/2 GI distress. During event in 10/23, pt found to have NSTEMI likely in context of stress cardiomyopathy. LHCath negative, showing nonobstructive disease. pt follows with cardiologist; unlikely cardiac etiology to the episodes. Pt also reports having done MR brain and EEG done recently which was wnl. (17 Jan 2024 23:34)  Hospital Course: Patient presented to ED with Syncopal episode questionable seizure like activity. Neurology evaluated-> CT Head ->IMPRESSION: No CT evidence of acute intracranial hemorrhage, brain edema, or mass effect. CT Chest/A/P-> IMPRESSION: Unchanged aneurysmal dilatation of the ascending aorta status post repair. No aortic dissection. No acute intrathoracic or intra-abdominal/pelvic findings. Recs cardiology work-up. Orthostatics neg. Patient seen and evaluated by cardiology, stated on lacosamide 100 mg BID. Leukocytosis mild likely reactive. Recommends outpatient EEG and follow-up with Dr. Josse Miramontes ( Neurology). Rx sent for Vimpat to [facility]. D/w pt's cardiologist Dr Ferrer and given extensive workup unlikely syncope. Pt ambulating and stable for dc w/ outpt neuro follow up.  Important Medication Changes and Reason: Started on Lacosamide 100 mg twice daily

## 2024-01-31 NOTE — ASSESSMENT
[FreeTextEntry1] :   syncope x2, cardiac workup and his negative.  Thought to be related to seizure disorder.  Started on Vimpat.   -Follow-up as per neurology -Continue Vimpat as directed -seizure precautions   s/p aortic valve replacement with bioprosthetic valve. -f/u as per cardio   Recurrent foot callus. Seen by podiatrist several years ago with no resolution. -podiatry referral  Urinary incontinence, incomplete emptying. Denies any dysuria, hematuria or back pain. -check PSA -urology referral  Hypertension, controlled -cont meds as directed  Hyperlipidemia on statin -Will check labs  Overweight -Being overweight increases your risk of health conditions such as heart disease, high blood pressure, type 2 diabetes, and certain types of cancer. It can also increase your risk for osteoarthritis, sleep apnea, and other respiratory problems. Aim for a slow, steady weight loss. Even a small amount of weight loss can lower your risk of health problems.

## 2024-03-30 ENCOUNTER — EMERGENCY (EMERGENCY)
Facility: HOSPITAL | Age: 74
LOS: 1 days | Discharge: HOME | End: 2024-03-30
Attending: STUDENT IN AN ORGANIZED HEALTH CARE EDUCATION/TRAINING PROGRAM
Payer: COMMERCIAL

## 2024-03-30 VITALS
HEIGHT: 72 IN | RESPIRATION RATE: 18 BRPM | OXYGEN SATURATION: 97 % | DIASTOLIC BLOOD PRESSURE: 95 MMHG | HEART RATE: 84 BPM | SYSTOLIC BLOOD PRESSURE: 115 MMHG | WEIGHT: 229.94 LBS

## 2024-03-30 VITALS
HEART RATE: 74 BPM | SYSTOLIC BLOOD PRESSURE: 129 MMHG | TEMPERATURE: 98 F | RESPIRATION RATE: 18 BRPM | OXYGEN SATURATION: 97 % | DIASTOLIC BLOOD PRESSURE: 84 MMHG

## 2024-03-30 DIAGNOSIS — Z95.2 PRESENCE OF PROSTHETIC HEART VALVE: Chronic | ICD-10-CM

## 2024-03-30 DIAGNOSIS — Z98.890 OTHER SPECIFIED POSTPROCEDURAL STATES: Chronic | ICD-10-CM

## 2024-03-30 LAB
ALBUMIN SERPL ELPH-MCNC: 3.9 G/DL — SIGNIFICANT CHANGE UP (ref 3.3–5)
ALP SERPL-CCNC: 50 U/L — SIGNIFICANT CHANGE UP (ref 40–120)
ALT FLD-CCNC: 20 U/L — SIGNIFICANT CHANGE UP (ref 10–45)
ANION GAP SERPL CALC-SCNC: 12 MMOL/L — SIGNIFICANT CHANGE UP (ref 5–17)
APPEARANCE UR: CLEAR — SIGNIFICANT CHANGE UP
AST SERPL-CCNC: 18 U/L — SIGNIFICANT CHANGE UP (ref 10–40)
BACTERIA # UR AUTO: NEGATIVE /HPF — SIGNIFICANT CHANGE UP
BASOPHILS # BLD AUTO: 0.03 K/UL — SIGNIFICANT CHANGE UP (ref 0–0.2)
BASOPHILS NFR BLD AUTO: 0.3 % — SIGNIFICANT CHANGE UP (ref 0–2)
BILIRUB SERPL-MCNC: 0.6 MG/DL — SIGNIFICANT CHANGE UP (ref 0.2–1.2)
BILIRUB UR-MCNC: NEGATIVE — SIGNIFICANT CHANGE UP
BUN SERPL-MCNC: 23 MG/DL — SIGNIFICANT CHANGE UP (ref 7–23)
CALCIUM SERPL-MCNC: 9.1 MG/DL — SIGNIFICANT CHANGE UP (ref 8.4–10.5)
CAST: 2 /LPF — SIGNIFICANT CHANGE UP (ref 0–4)
CHLORIDE SERPL-SCNC: 103 MMOL/L — SIGNIFICANT CHANGE UP (ref 96–108)
CO2 SERPL-SCNC: 23 MMOL/L — SIGNIFICANT CHANGE UP (ref 22–31)
COLOR SPEC: YELLOW — SIGNIFICANT CHANGE UP
CREAT SERPL-MCNC: 0.79 MG/DL — SIGNIFICANT CHANGE UP (ref 0.5–1.3)
DIFF PNL FLD: NEGATIVE — SIGNIFICANT CHANGE UP
EGFR: 93 ML/MIN/1.73M2 — SIGNIFICANT CHANGE UP
EOSINOPHIL # BLD AUTO: 0.1 K/UL — SIGNIFICANT CHANGE UP (ref 0–0.5)
EOSINOPHIL NFR BLD AUTO: 1.1 % — SIGNIFICANT CHANGE UP (ref 0–6)
FLUAV AG NPH QL: SIGNIFICANT CHANGE UP
FLUBV AG NPH QL: SIGNIFICANT CHANGE UP
GLUCOSE SERPL-MCNC: 186 MG/DL — HIGH (ref 70–99)
GLUCOSE UR QL: NEGATIVE MG/DL — SIGNIFICANT CHANGE UP
HCT VFR BLD CALC: 43.4 % — SIGNIFICANT CHANGE UP (ref 39–50)
HGB BLD-MCNC: 14 G/DL — SIGNIFICANT CHANGE UP (ref 13–17)
IMM GRANULOCYTES NFR BLD AUTO: 0.4 % — SIGNIFICANT CHANGE UP (ref 0–0.9)
KETONES UR-MCNC: NEGATIVE MG/DL — SIGNIFICANT CHANGE UP
LEUKOCYTE ESTERASE UR-ACNC: NEGATIVE — SIGNIFICANT CHANGE UP
LYMPHOCYTES # BLD AUTO: 1.24 K/UL — SIGNIFICANT CHANGE UP (ref 1–3.3)
LYMPHOCYTES # BLD AUTO: 13.2 % — SIGNIFICANT CHANGE UP (ref 13–44)
MCHC RBC-ENTMCNC: 28.9 PG — SIGNIFICANT CHANGE UP (ref 27–34)
MCHC RBC-ENTMCNC: 32.3 GM/DL — SIGNIFICANT CHANGE UP (ref 32–36)
MCV RBC AUTO: 89.7 FL — SIGNIFICANT CHANGE UP (ref 80–100)
MONOCYTES # BLD AUTO: 0.55 K/UL — SIGNIFICANT CHANGE UP (ref 0–0.9)
MONOCYTES NFR BLD AUTO: 5.9 % — SIGNIFICANT CHANGE UP (ref 2–14)
NEUTROPHILS # BLD AUTO: 7.42 K/UL — HIGH (ref 1.8–7.4)
NEUTROPHILS NFR BLD AUTO: 79.1 % — HIGH (ref 43–77)
NITRITE UR-MCNC: NEGATIVE — SIGNIFICANT CHANGE UP
NRBC # BLD: 0 /100 WBCS — SIGNIFICANT CHANGE UP (ref 0–0)
PH UR: 6 — SIGNIFICANT CHANGE UP (ref 5–8)
PLATELET # BLD AUTO: 169 K/UL — SIGNIFICANT CHANGE UP (ref 150–400)
POTASSIUM SERPL-MCNC: 3.8 MMOL/L — SIGNIFICANT CHANGE UP (ref 3.5–5.3)
POTASSIUM SERPL-SCNC: 3.8 MMOL/L — SIGNIFICANT CHANGE UP (ref 3.5–5.3)
PROT SERPL-MCNC: 6.9 G/DL — SIGNIFICANT CHANGE UP (ref 6–8.3)
PROT UR-MCNC: 30 MG/DL
RBC # BLD: 4.84 M/UL — SIGNIFICANT CHANGE UP (ref 4.2–5.8)
RBC # FLD: 12.9 % — SIGNIFICANT CHANGE UP (ref 10.3–14.5)
RBC CASTS # UR COMP ASSIST: 1 /HPF — SIGNIFICANT CHANGE UP (ref 0–4)
RSV RNA NPH QL NAA+NON-PROBE: SIGNIFICANT CHANGE UP
SARS-COV-2 RNA SPEC QL NAA+PROBE: DETECTED
SODIUM SERPL-SCNC: 138 MMOL/L — SIGNIFICANT CHANGE UP (ref 135–145)
SP GR SPEC: 1.02 — SIGNIFICANT CHANGE UP (ref 1–1.03)
SQUAMOUS # UR AUTO: 1 /HPF — SIGNIFICANT CHANGE UP (ref 0–5)
UROBILINOGEN FLD QL: 0.2 MG/DL — SIGNIFICANT CHANGE UP (ref 0.2–1)
WBC # BLD: 9.38 K/UL — SIGNIFICANT CHANGE UP (ref 3.8–10.5)
WBC # FLD AUTO: 9.38 K/UL — SIGNIFICANT CHANGE UP (ref 3.8–10.5)
WBC UR QL: 2 /HPF — SIGNIFICANT CHANGE UP (ref 0–5)

## 2024-03-30 PROCEDURE — 87086 URINE CULTURE/COLONY COUNT: CPT

## 2024-03-30 PROCEDURE — 87077 CULTURE AEROBIC IDENTIFY: CPT

## 2024-03-30 PROCEDURE — 70450 CT HEAD/BRAIN W/O DYE: CPT | Mod: 26,MC

## 2024-03-30 PROCEDURE — 99285 EMERGENCY DEPT VISIT HI MDM: CPT | Mod: 25

## 2024-03-30 PROCEDURE — C9254: CPT

## 2024-03-30 PROCEDURE — 99285 EMERGENCY DEPT VISIT HI MDM: CPT

## 2024-03-30 PROCEDURE — 80053 COMPREHEN METABOLIC PANEL: CPT

## 2024-03-30 PROCEDURE — 70450 CT HEAD/BRAIN W/O DYE: CPT | Mod: MC

## 2024-03-30 PROCEDURE — 81001 URINALYSIS AUTO W/SCOPE: CPT

## 2024-03-30 PROCEDURE — 93005 ELECTROCARDIOGRAM TRACING: CPT

## 2024-03-30 PROCEDURE — 87637 SARSCOV2&INF A&B&RSV AMP PRB: CPT

## 2024-03-30 PROCEDURE — 71045 X-RAY EXAM CHEST 1 VIEW: CPT

## 2024-03-30 PROCEDURE — 85025 COMPLETE CBC W/AUTO DIFF WBC: CPT

## 2024-03-30 PROCEDURE — 71045 X-RAY EXAM CHEST 1 VIEW: CPT | Mod: 26

## 2024-03-30 PROCEDURE — 99053 MED SERV 10PM-8AM 24 HR FAC: CPT

## 2024-03-30 PROCEDURE — 96374 THER/PROPH/DIAG INJ IV PUSH: CPT

## 2024-03-30 PROCEDURE — 80235 DRUG ASSAY LACOSAMIDE: CPT

## 2024-03-30 RX ORDER — LACOSAMIDE 50 MG/1
200 TABLET ORAL ONCE
Refills: 0 | Status: DISCONTINUED | OUTPATIENT
Start: 2024-03-30 | End: 2024-03-30

## 2024-03-30 RX ADMIN — LACOSAMIDE 140 MILLIGRAM(S): 50 TABLET ORAL at 05:54

## 2024-03-30 NOTE — ED PROVIDER NOTE - PROGRESS NOTE DETAILS
Attending Gilberto Mcclure:  Will give AM lacosamide dose IV, level pending. Covid positive, likely trigger for dec sz threshold. All discussed with patient. No o2 req. Strict return precautions discussed. Stable for dc with outpt f/u.

## 2024-03-30 NOTE — ED PROVIDER NOTE - STUDIES
EKG - see results section for interpretation EKG - see results section for interpretation/Xray Image(s) - see wet read section for interpretation/CT Scan images

## 2024-03-30 NOTE — ED PROVIDER NOTE - NSFOLLOWUPINSTRUCTIONS_ED_ALL_ED_FT
You were seen in the emergency department for seizure.    1) Follow-up with your primary care provider in 1-2 days. Follow up with your neurologist within 1-3 days.    2) Continue to take all medications as prescribed.    3) Rest and stay hydrated. Pain can be managed with Acetaminophen (aka Tylenol) over the counter as directed.    4) Return to the ER for any new or worsening symptoms. Signs to look for include fevers, inability to keep food down, head trauma, seizures that occur despite take taking medications, or any other worsening or concerning symptoms.      Please read all attached patient information.

## 2024-03-30 NOTE — ED PROVIDER NOTE - ATTENDING CONTRIBUTION TO CARE
Attending (Gilberto Mcclure D.O.):  I have personally seen and examined this patient. I have performed a substantive portion of the visit including all aspects of the medical decision making. Resident, fellow, student, and/or ACP note reviewed. I agree on the plan of care except where noted.    74M here for sz at home, unwitnessed. Patient hx complicated by TBI 6 months ago, on lacosamide. Reports compliance with meds. Per wife at bedside, patient went to sleep early, she then noticed him walking in the house but not responding to her calling. Followed patient and shortly after saw he had fallen on some plants in the house. Reported seeing patient had urinated on self. dried blood on his pillow. Usual state of health otherwise, no infx sxs. Tolerating a nl diet. Having bms/nl urination. Denies chest pain, shortness of breath.     Hemodynam stable, NAD, + tongue laceration. PERRL 3mm L, 2mm right (baseline). Nontender face. Neck supple. Nontender chest wall and abd. No cva ttp. No bruising. Full str/neurovasc/rom all 4 extrem. No drift.    Hx and exam suggestive of breakthrough sz but no hx to suggest etiology. No signs/sxs of infection. Check for metabolic derrangement. EKG to eval cardiac conduction abnormalities. Check labs, EKG, reassess.

## 2024-03-30 NOTE — ED PROVIDER NOTE - PATIENT PORTAL LINK FT
You can access the FollowMyHealth Patient Portal offered by [facility] by registering at the following website: [email or web address]. By joining [employer]’s FollowMyHealth portal, you will also be able to view your health information using other applications (apps) compatible with our system.

## 2024-03-30 NOTE — ED ADULT NURSE NOTE - OBJECTIVE STATEMENT
74y M Naomi x4 came in after having a seizure. Patient's wife is at bedside and reports the patient woke up and began walking around and was not responding to her. Patient was actively urinating and then fell on his back and hit his face on potted plants. Patient does not remember what happened. PMH of seizures. Patient has dried blood around his lips following fall. Eyes are PERRL. Patient denies any pain, no obvious signs of deformity, lacerations, or abrasion. Patient has bruise on his back from fall. Denies fever, chills, headache, blurry vision, dizziness, n/v/d, dysuria, weakness, numbness, tingling, SOB, and chest pain. Wife is present at bedside. Patient on continuous cardiac monitor. Call bell within reach, bed in lowest position.

## 2024-03-30 NOTE — ED ADULT NURSE NOTE - NSFALLHARMRISKINTERV_ED_ALL_ED

## 2024-03-30 NOTE — ED PROVIDER NOTE - OBJECTIVE STATEMENT
74M h/o HTN, HLD, nonobstructive CAD, AI and TAA s/p AVR and ascending aorta repair 2018, traumatic subdural hematoma x2 (6/2023), syncope (10/2023), self-tapered antiepileptics 2/2 GI intolerance, presenting after likely seizure overnight. Patient's wife reports that tonight around 2am, she saw her jusband walking and when she called his name he did not repond but kept walking. She shortly after heard him fall and found him on the floor. Patient was confused. She also noticed that patient's pillow had had blood on it and that he had a tongue lac. Patient had had urinary incontinence as well. Patient reports that he does not remember incident. Unsure if he took last night's dose of lacosamide. Denies any CP, neck pain, sob, n/v, abd pain, diarrhea, cough, congestion, fevers. Wife reports that patient's mental status has improved compared to earlier. However presently AOx2 instead of AOx3 (baseline).

## 2024-03-30 NOTE — ED PROVIDER NOTE - NSFOLLOWUPCLINICS_GEN_ALL_ED_FT
[facility]  Neurology Epilepsy  [address] [place] [address], [address] [place] [id number]  Phone: [phone]  Fax: [phone]

## 2024-03-30 NOTE — ED PROVIDER NOTE - PHYSICAL EXAMINATION
GENERAL: NAD, lying in bed comfortably  HEAD:  Atraumatic, Normocephalic  EYES: EOMI, conjunctiva and sclera clear  ENT: Moist mucous membranes. Anterior tongue lac  NECK: Supple  CHEST/LUNG: Unlabored respirations. Clear to auscultation bilaterally. No rales, rhonchi, wheezing, or rubs  HEART: Regular rate and rhythm. No murmurs, rubs, or gallops  ABDOMEN: Soft, nondistended, nontender  EXTREMITIES:  No cyanosis or edema. Brisk capillary refill  NERVOUS SYSTEM:  No focal deficits. A&Ox2  MSK: No hip ttp. No chest wall ttp. No midline ttp along spine, including c-spine.  SKIN: +erythema on L back, non-ttp.

## 2024-03-30 NOTE — ED PROVIDER NOTE - CLINICAL SUMMARY MEDICAL DECISION MAKING FREE TEXT BOX
74M h/o HTN, HLD, nonobstructive CAD, AI and TAA s/p AVR and ascending aorta repair 2018, traumatic subdural hematoma x2 (6/2023), syncope (10/2023), self-tapered antiepileptics 2/2 GI intolerance, presenting after likely seizure overnight.     VS WNL. PE shows horizontal lac on anterior tongue. Pt is AOx2. Atraumatic head. +erythema on L back. Lungs CTA b/l. EOMI. high suspicion for sz in setting of missed medication. will assess for infection and electrolyte disturbances. Plan: cbc, cmp, CXR, flu/covid, UA/UC, CTH, lacosamide dose

## 2024-04-01 ENCOUNTER — APPOINTMENT (OUTPATIENT)
Dept: INTERNAL MEDICINE | Facility: CLINIC | Age: 74
End: 2024-04-01
Payer: COMMERCIAL

## 2024-04-01 VITALS
SYSTOLIC BLOOD PRESSURE: 153 MMHG | HEIGHT: 72 IN | DIASTOLIC BLOOD PRESSURE: 82 MMHG | OXYGEN SATURATION: 97 % | BODY MASS INDEX: 30.61 KG/M2 | WEIGHT: 226 LBS | HEART RATE: 48 BPM

## 2024-04-01 DIAGNOSIS — E78.5 HYPERLIPIDEMIA, UNSPECIFIED: ICD-10-CM

## 2024-04-01 LAB
CULTURE RESULTS: SIGNIFICANT CHANGE UP
SPECIMEN SOURCE: SIGNIFICANT CHANGE UP

## 2024-04-01 PROCEDURE — 36415 COLL VENOUS BLD VENIPUNCTURE: CPT

## 2024-04-01 PROCEDURE — G2211 COMPLEX E/M VISIT ADD ON: CPT

## 2024-04-01 PROCEDURE — 99214 OFFICE O/P EST MOD 30 MIN: CPT

## 2024-04-01 NOTE — PHYSICAL EXAM
[Normal] : no respiratory distress, lungs were clear to auscultation bilaterally and no accessory muscle use [Normal Rate] : normal rate  [Regular Rhythm] : with a regular rhythm [Normal S1, S2] : normal S1 and S2 [Soft] : abdomen soft [Non Tender] : non-tender [Non-distended] : non-distended [Normal Posterior Cervical Nodes] : no posterior cervical lymphadenopathy [Normal Anterior Cervical Nodes] : no anterior cervical lymphadenopathy [No Focal Deficits] : no focal deficits [Normal Affect] : the affect was normal [Normal Mood] : the mood was normal [de-identified] : +murmur

## 2024-04-01 NOTE — HISTORY OF PRESENT ILLNESS
[de-identified] : 73 year old male with history of obesity, hypertension, hyperlipidemia, aortic insufficiency, associated with asc. aortic aneurysm; s/p AVR (bovine) & repair of asc. aorta on 1/22/18 presents for follow-up.  was seen in [facility] on saturday after seizure.  noted to be +covid, as per patient, he was not told of this results.  denies any soboe, cough, fever/chills.  follows with Dr. Josse Miramontes, not the most compliant with his seizure meds, skips doses at times.    Coronary catheterization-unremarkable in 2023  heavy snoring, daytime sleepiness.  Never had sleep study done in the past.  Sleeps with several pillows under his head. Cannot recall if he got pneumonia vaccine in the past

## 2024-04-01 NOTE — ASSESSMENT
[FreeTextEntry1] : //  +covid, grossly asymptomatic -monitor  -advised to alert family members he saw yesturday for Easter   Seizure disorder. with recent breakthrough after non-compliance with meds  -Follow-up as per neurology, Dr. Josse Miramontes  -Continue Vimpat as directed -seizure precautions   s/p aortic valve replacement with bioprosthetic valve. -f/u as per cardio   Recurrent foot callus. Seen by podiatrist several years ago with no resolution. -podiatry referral  Urinary incontinence, incomplete emptying. Denies any dysuria, hematuria or back pain. -check PSA -urology referral  Hypertension, controlled -cont meds as directed  Hyperlipidemia on statin -Will check labs  Overweight -Being overweight increases your risk of health conditions such as heart disease, high blood pressure, type 2 diabetes, and certain types of cancer. It can also increase your risk for osteoarthritis, sleep apnea, and other respiratory problems. Aim for a slow, steady weight loss. Even a small amount of weight loss can lower your risk of health problems.

## 2024-04-02 ENCOUNTER — CLINICAL ADVICE (OUTPATIENT)
Age: 74
End: 2024-04-02

## 2024-04-02 LAB
ALBUMIN SERPL ELPH-MCNC: 4.5 G/DL
ALP BLD-CCNC: 53 U/L
ALT SERPL-CCNC: 20 U/L
ANION GAP SERPL CALC-SCNC: 10 MMOL/L
AST SERPL-CCNC: 20 U/L
BASOPHILS # BLD AUTO: 0.02 K/UL
BASOPHILS NFR BLD AUTO: 0.2 %
BILIRUB DIRECT SERPL-MCNC: 0.2 MG/DL
BILIRUB INDIRECT SERPL-MCNC: 0.5 MG/DL
BILIRUB SERPL-MCNC: 0.7 MG/DL
BUN SERPL-MCNC: 19 MG/DL
CALCIUM SERPL-MCNC: 9 MG/DL
CHLORIDE SERPL-SCNC: 100 MMOL/L
CHOLEST SERPL-MCNC: 129 MG/DL
CO2 SERPL-SCNC: 28 MMOL/L
CREAT SERPL-MCNC: 0.84 MG/DL
EGFR: 91 ML/MIN/1.73M2
EOSINOPHIL # BLD AUTO: 0.17 K/UL
EOSINOPHIL NFR BLD AUTO: 2.1 %
ESTIMATED AVERAGE GLUCOSE: 131 MG/DL
GLUCOSE SERPL-MCNC: 101 MG/DL
HBA1C MFR BLD HPLC: 6.2 %
HCT VFR BLD CALC: 42.3 %
HDLC SERPL-MCNC: 40 MG/DL
HGB BLD-MCNC: 14.1 G/DL
IMM GRANULOCYTES NFR BLD AUTO: 0.1 %
LACOSAMIDE (VIMPAT) RESULT: 2 UG/ML — SIGNIFICANT CHANGE UP (ref 1–10)
LDLC SERPL CALC-MCNC: 73 MG/DL
LYMPHOCYTES # BLD AUTO: 1.97 K/UL
LYMPHOCYTES NFR BLD AUTO: 24.5 %
MAN DIFF?: NORMAL
MCHC RBC-ENTMCNC: 29.9 PG
MCHC RBC-ENTMCNC: 33.3 GM/DL
MCV RBC AUTO: 89.8 FL
MONOCYTES # BLD AUTO: 0.68 K/UL
MONOCYTES NFR BLD AUTO: 8.4 %
NEUTROPHILS # BLD AUTO: 5.2 K/UL
NEUTROPHILS NFR BLD AUTO: 64.7 %
NONHDLC SERPL-MCNC: 89 MG/DL
PLATELET # BLD AUTO: 180 K/UL
POTASSIUM SERPL-SCNC: 4.3 MMOL/L
PROT SERPL-MCNC: 7.2 G/DL
RBC # BLD: 4.71 M/UL
RBC # FLD: 13.2 %
SODIUM SERPL-SCNC: 138 MMOL/L
TRIGL SERPL-MCNC: 84 MG/DL
TSH SERPL-ACNC: 0.79 UIU/ML
WBC # FLD AUTO: 8.05 K/UL

## 2024-05-09 PROBLEM — I10 HTN (HYPERTENSION): Status: ACTIVE | Noted: 2018-11-12

## 2024-05-09 PROBLEM — R55 SYNCOPE, UNSPECIFIED SYNCOPE TYPE: Status: ACTIVE | Noted: 2023-10-13

## 2024-05-09 PROBLEM — Z95.3 S/P AORTIC VALVE REPLACEMENT WITH BIOPROSTHETIC VALVE: Status: ACTIVE | Noted: 2018-02-01

## 2024-05-09 PROBLEM — I25.10 NON-OCCLUSIVE CORONARY ARTERY DISEASE: Status: ACTIVE | Noted: 2019-05-13

## 2024-05-10 ENCOUNTER — APPOINTMENT (OUTPATIENT)
Dept: CARDIOLOGY | Facility: CLINIC | Age: 74
End: 2024-05-10
Payer: COMMERCIAL

## 2024-05-10 ENCOUNTER — NON-APPOINTMENT (OUTPATIENT)
Age: 74
End: 2024-05-10

## 2024-05-10 VITALS
BODY MASS INDEX: 31.02 KG/M2 | HEART RATE: 52 BPM | DIASTOLIC BLOOD PRESSURE: 80 MMHG | WEIGHT: 229 LBS | HEIGHT: 72 IN | SYSTOLIC BLOOD PRESSURE: 136 MMHG | OXYGEN SATURATION: 99 %

## 2024-05-10 DIAGNOSIS — R55 SYNCOPE AND COLLAPSE: ICD-10-CM

## 2024-05-10 DIAGNOSIS — Z95.3 PRESENCE OF XENOGENIC HEART VALVE: ICD-10-CM

## 2024-05-10 DIAGNOSIS — I10 ESSENTIAL (PRIMARY) HYPERTENSION: ICD-10-CM

## 2024-05-10 DIAGNOSIS — I25.10 ATHEROSCLEROTIC HEART DISEASE OF NATIVE CORONARY ARTERY W/OUT ANGINA PECTORIS: ICD-10-CM

## 2024-05-10 PROCEDURE — 93000 ELECTROCARDIOGRAM COMPLETE: CPT

## 2024-05-10 PROCEDURE — G2211 COMPLEX E/M VISIT ADD ON: CPT

## 2024-05-10 PROCEDURE — 99214 OFFICE O/P EST MOD 30 MIN: CPT

## 2024-05-10 RX ORDER — LACOSAMIDE 200 MG/1
TABLET, FILM COATED ORAL
Refills: 0 | Status: ACTIVE | COMMUNITY

## 2024-05-10 NOTE — HISTORY OF PRESENT ILLNESS
[FreeTextEntry1] : In June of this last year, Mrs. Sam Pastor) found him unconscious at the foot of the staircase from the second floor in a pool of blood; presumably, he had fallen down the stairs.  He was brought to [facility].  Head imaging at that time showed evidence of an intracranial bleed; he was monitored in the ICU and remained stable.  After discharge, he followed up with a ?neurologist, but apparently no further treatment was necessary.  He was apparently discharged with and anti-seizure med, but this was stopped by the patient due to GI distress.  He was admitted to [facility] in Jan. 2024 after an episode of LOC at home.  His wife was in the next room and heard him fall to the ground.  She found him unresponsive, with his eyes "rolled back in his head".  She called EMS; when they arrived, Maria Esther reports they had some difficulty initially arousing him.  When he started to awaken, he was combative and needed to be restrained on the gurney and she tells me that he was sedated en route to the hospital.  After arrival in the ED, his wife noted subtle asymmetry of the lips.  He remains altered; while he was able to speak, although the words were somewhat garbled and he did not necessarily make complete sense.  After 3-4 hours, he seemed to return to his normal mental state.  In the ED, a non-contrast CT was done which showed no evidence of acute injury or bleed.  There were no apparent cardiac sxs. at that time.  Cardiac testing revealed elevated troponin level, peaking at peaked at 357 and then downtrending. EKG w/ septal Q waves. TTE w/ EF 45% w/ WMA.  Cath showed no obstructive dz. Picture felt likely due to stress cardiomyopathy.  Cardiac MRI with no evidence of myocardial scarring.  Telemetry was apparently unrevealing.  Since I saw him last, event monitor showed no significant rhythm disturbance.  He also saw Dr. Josse Miramontes.  Apparently, he has been having seizures.  He had a recent episode, prompting a visit to the emergency department at [facility] on March 30, 2024.  He may have missed his evening dose of lacosamide.  Workup in the emergency department included a CT scan of the head which showed no acute intracranial pathology.  Routine blood work and chest x-ray were unremarkable.  As he returns today, he has been stable without recurrence of seizure.  From a cardiac standpoint, he continues his usual activities without restriction.  He describes no episodes of exertional chest discomfort or exertional dyspnea.  He reports no palpitations and there have been no episodes of lightheadedness or recurrence of LOC.  He describes no episodes of orthopnea or PND.

## 2024-05-10 NOTE — ASSESSMENT
[FreeTextEntry1] : ========================= Ascending aortic aneurysm with AI, s/p AVR (bovine) & repair of Asc aorta on 1/22/18 by Dr. Alvarado.  Mild LV enlargement at end diastole. Mild global systolic dysfunction echo with preserved EF.  HTN  Hyperlipidemia, on simvastatin.  Non-obstructive CAD with 30% RCA lesion at cardiac cath in April 2016 by Dr. DAMI Wilder.   Syncope  Fall with intracranial bleed June 2023.

## 2024-05-10 NOTE — PHYSICAL EXAM
[General Appearance - Well Developed] : well developed [Normal Appearance] : normal appearance [Well Groomed] : well groomed [General Appearance - Well Nourished] : well nourished [General Appearance - In No Acute Distress] : no acute distress [Normal Conjunctiva] : the conjunctiva exhibited no abnormalities [Eyelids - No Xanthelasma] : the eyelids demonstrated no xanthelasmas [Normal Jugular Venous A Waves Present] : normal jugular venous A waves present [Normal Jugular Venous V Waves Present] : normal jugular venous V waves present [Respiration, Rhythm And Depth] : normal respiratory rhythm and effort [Auscultation Breath Sounds / Voice Sounds] : lungs were clear to auscultation bilaterally [Heart Rate And Rhythm] : heart rate and rhythm were normal [Bowel Sounds] : normal bowel sounds [Abnormal Walk] : normal gait [Nail Clubbing] : no clubbing of the fingernails [Cyanosis, Localized] : no localized cyanosis [Skin Color & Pigmentation] : normal skin color and pigmentation [] : no rash [Oriented To Time, Place, And Person] : oriented to person, place, and time [Impaired Insight] : insight and judgment were intact [Affect] : the affect was normal [Mood] : the mood was normal [FreeTextEntry1] : Soft bruit. Soft, non-tender. Liver and spleen not palpable; aortic pulsation not palpable.

## 2024-05-10 NOTE — DISCUSSION/SUMMARY
[EKG obtained to assist in diagnosis and management of assessed problem(s)] : EKG obtained to assist in diagnosis and management of assessed problem(s) [FreeTextEntry1] : EKG today shows: Sinus Bradycardia at 49 bpm.  First degree A-V block, R IVCD, no significant change compared to prior tracing.  PLAN: 1.  Syncope, due to seizure disorder.  Currently on Viimpat; continues f/u with Dr. Miramontes.     2.  HTN - BP in reasonable range today.    - continue current dosage of losartan and carvedilol   3.  HLD - continue atorvastatin at current dose.  Lipid profile was in good range when checked on April 2, 2024.  4.  Mild non-obstructive CAD - - continue statin -Continue low-dose ASA 81 mg daily.  33 minutes spent on today's office visit including review of emergency department records.  He will return for a visit in 6 months.

## 2024-05-10 NOTE — REASON FOR VISIT
[FreeTextEntry1] :   Mesfin Vargas returns for f/u after an episode of LOC.  Hx. includes AI and enlargement of the aortic root s/p repair and HLD.

## 2024-10-03 ENCOUNTER — RX RENEWAL (OUTPATIENT)
Age: 74
End: 2024-10-03

## 2024-11-08 ENCOUNTER — APPOINTMENT (OUTPATIENT)
Dept: CARDIOLOGY | Facility: CLINIC | Age: 74
End: 2024-11-08

## 2024-11-08 DIAGNOSIS — I10 ESSENTIAL (PRIMARY) HYPERTENSION: ICD-10-CM

## 2024-11-08 DIAGNOSIS — E78.5 HYPERLIPIDEMIA, UNSPECIFIED: ICD-10-CM

## 2024-11-08 DIAGNOSIS — I35.9 NONRHEUMATIC AORTIC VALVE DISORDER, UNSPECIFIED: ICD-10-CM

## 2024-11-08 DIAGNOSIS — Z95.3 PRESENCE OF XENOGENIC HEART VALVE: ICD-10-CM

## 2024-11-08 DIAGNOSIS — I25.10 ATHEROSCLEROTIC HEART DISEASE OF NATIVE CORONARY ARTERY W/OUT ANGINA PECTORIS: ICD-10-CM

## 2024-11-12 ENCOUNTER — APPOINTMENT (OUTPATIENT)
Dept: UROLOGY | Facility: CLINIC | Age: 74
End: 2024-11-12
Payer: COMMERCIAL

## 2024-11-12 VITALS
OXYGEN SATURATION: 96 % | HEIGHT: 72 IN | SYSTOLIC BLOOD PRESSURE: 139 MMHG | DIASTOLIC BLOOD PRESSURE: 89 MMHG | BODY MASS INDEX: 31.42 KG/M2 | HEART RATE: 57 BPM | WEIGHT: 232 LBS

## 2024-11-12 DIAGNOSIS — N40.1 BENIGN PROSTATIC HYPERPLASIA WITH LOWER URINARY TRACT SYMPMS: ICD-10-CM

## 2024-11-12 DIAGNOSIS — N39.41 URGE INCONTINENCE: ICD-10-CM

## 2024-11-12 DIAGNOSIS — N13.8 BENIGN PROSTATIC HYPERPLASIA WITH LOWER URINARY TRACT SYMPMS: ICD-10-CM

## 2024-11-12 DIAGNOSIS — R39.15 URGENCY OF URINATION: ICD-10-CM

## 2024-11-12 PROCEDURE — G2211 COMPLEX E/M VISIT ADD ON: CPT

## 2024-11-12 PROCEDURE — 99214 OFFICE O/P EST MOD 30 MIN: CPT

## 2024-11-12 RX ORDER — FINASTERIDE 5 MG/1
5 TABLET, FILM COATED ORAL
Qty: 90 | Refills: 3 | Status: ACTIVE | COMMUNITY
Start: 2024-11-12 | End: 1900-01-01

## 2024-11-13 LAB
APPEARANCE: CLEAR
BACTERIA: NEGATIVE /HPF
BILIRUBIN URINE: NEGATIVE
BLOOD URINE: NEGATIVE
CAST: 0 /LPF
COLOR: YELLOW
EPITHELIAL CELLS: 0 /HPF
GLUCOSE QUALITATIVE U: NEGATIVE MG/DL
KETONES URINE: NEGATIVE MG/DL
LEUKOCYTE ESTERASE URINE: NEGATIVE
MICROSCOPIC-UA: NORMAL
NITRITE URINE: NEGATIVE
PH URINE: 6.5
PROTEIN URINE: NEGATIVE MG/DL
RED BLOOD CELLS URINE: 0 /HPF
SPECIFIC GRAVITY URINE: 1.01
UROBILINOGEN URINE: 0.2 MG/DL
WHITE BLOOD CELLS URINE: 0 /HPF

## 2024-11-14 LAB — BACTERIA UR CULT: NORMAL

## 2024-11-15 ENCOUNTER — APPOINTMENT (OUTPATIENT)
Dept: INTERNAL MEDICINE | Facility: CLINIC | Age: 74
End: 2024-11-15
Payer: COMMERCIAL

## 2024-11-15 PROCEDURE — 90662 IIV NO PRSV INCREASED AG IM: CPT

## 2024-11-15 PROCEDURE — G0008: CPT

## 2024-12-11 ENCOUNTER — NON-APPOINTMENT (OUTPATIENT)
Age: 74
End: 2024-12-11

## 2024-12-11 ENCOUNTER — APPOINTMENT (OUTPATIENT)
Dept: INTERNAL MEDICINE | Facility: CLINIC | Age: 74
End: 2024-12-11
Payer: COMMERCIAL

## 2024-12-11 VITALS
HEART RATE: 78 BPM | OXYGEN SATURATION: 94 % | WEIGHT: 233 LBS | DIASTOLIC BLOOD PRESSURE: 72 MMHG | HEIGHT: 72 IN | BODY MASS INDEX: 31.56 KG/M2 | RESPIRATION RATE: 16 BRPM | SYSTOLIC BLOOD PRESSURE: 117 MMHG

## 2024-12-11 DIAGNOSIS — Z00.00 ENCOUNTER FOR GENERAL ADULT MEDICAL EXAMINATION W/OUT ABNORMAL FINDINGS: ICD-10-CM

## 2024-12-11 DIAGNOSIS — E78.5 HYPERLIPIDEMIA, UNSPECIFIED: ICD-10-CM

## 2024-12-11 DIAGNOSIS — I10 ESSENTIAL (PRIMARY) HYPERTENSION: ICD-10-CM

## 2024-12-11 PROCEDURE — 36415 COLL VENOUS BLD VENIPUNCTURE: CPT

## 2024-12-11 PROCEDURE — 93000 ELECTROCARDIOGRAM COMPLETE: CPT

## 2024-12-11 PROCEDURE — 99397 PER PM REEVAL EST PAT 65+ YR: CPT

## 2024-12-12 ENCOUNTER — CLINICAL ADVICE (OUTPATIENT)
Age: 74
End: 2024-12-12

## 2024-12-12 LAB
25(OH)D3 SERPL-MCNC: 25.5 NG/ML
ALBUMIN SERPL ELPH-MCNC: 4.4 G/DL
ALP BLD-CCNC: 63 U/L
ALT SERPL-CCNC: 22 U/L
ANION GAP SERPL CALC-SCNC: 10 MMOL/L
APPEARANCE: CLEAR
AST SERPL-CCNC: 19 U/L
BACTERIA: NEGATIVE /HPF
BASOPHILS # BLD AUTO: 0.03 K/UL
BASOPHILS NFR BLD AUTO: 0.3 %
BILIRUB DIRECT SERPL-MCNC: 0.2 MG/DL
BILIRUB INDIRECT SERPL-MCNC: 0.5 MG/DL
BILIRUB SERPL-MCNC: 0.7 MG/DL
BILIRUBIN URINE: NEGATIVE
BLOOD URINE: NEGATIVE
BUN SERPL-MCNC: 23 MG/DL
CALCIUM SERPL-MCNC: 9.1 MG/DL
CAST: 0 /LPF
CHLORIDE SERPL-SCNC: 103 MMOL/L
CHOLEST SERPL-MCNC: 121 MG/DL
CO2 SERPL-SCNC: 27 MMOL/L
COLOR: YELLOW
CREAT SERPL-MCNC: 0.87 MG/DL
EGFR: 90 ML/MIN/1.73M2
EOSINOPHIL # BLD AUTO: 0.13 K/UL
EOSINOPHIL NFR BLD AUTO: 1.2 %
EPITHELIAL CELLS: 0 /HPF
ESTIMATED AVERAGE GLUCOSE: 137 MG/DL
FERRITIN SERPL-MCNC: 288 NG/ML
FOLATE SERPL-MCNC: 16.5 NG/ML
GLUCOSE QUALITATIVE U: NEGATIVE MG/DL
GLUCOSE SERPL-MCNC: 95 MG/DL
HBA1C MFR BLD HPLC: 6.4 %
HCT VFR BLD CALC: 45.4 %
HDLC SERPL-MCNC: 38 MG/DL
HGB BLD-MCNC: 14.7 G/DL
IMM GRANULOCYTES NFR BLD AUTO: 0.2 %
IRON SATN MFR SERPL: 15 %
IRON SERPL-MCNC: 41 UG/DL
KETONES URINE: NEGATIVE MG/DL
LDLC SERPL CALC-MCNC: 63 MG/DL
LEUKOCYTE ESTERASE URINE: NEGATIVE
LYMPHOCYTES # BLD AUTO: 2.51 K/UL
LYMPHOCYTES NFR BLD AUTO: 23 %
MAN DIFF?: NORMAL
MCHC RBC-ENTMCNC: 29.9 PG
MCHC RBC-ENTMCNC: 32.4 G/DL
MCV RBC AUTO: 92.5 FL
MICROSCOPIC-UA: NORMAL
MONOCYTES # BLD AUTO: 0.8 K/UL
MONOCYTES NFR BLD AUTO: 7.3 %
NEUTROPHILS # BLD AUTO: 7.41 K/UL
NEUTROPHILS NFR BLD AUTO: 68 %
NITRITE URINE: NEGATIVE
NONHDLC SERPL-MCNC: 83 MG/DL
PH URINE: 5.5
PLATELET # BLD AUTO: 208 K/UL
POTASSIUM SERPL-SCNC: 4.7 MMOL/L
PROT SERPL-MCNC: 7.2 G/DL
PROTEIN URINE: NEGATIVE MG/DL
PSA SERPL-MCNC: 1.49 NG/ML
RBC # BLD: 4.91 M/UL
RBC # FLD: 13.2 %
RED BLOOD CELLS URINE: 0 /HPF
SODIUM SERPL-SCNC: 139 MMOL/L
SPECIFIC GRAVITY URINE: 1.02
TIBC SERPL-MCNC: 266 UG/DL
TRIGL SERPL-MCNC: 111 MG/DL
TSH SERPL-ACNC: 0.88 UIU/ML
UIBC SERPL-MCNC: 225 UG/DL
UROBILINOGEN URINE: 0.2 MG/DL
VIT B12 SERPL-MCNC: 512 PG/ML
WBC # FLD AUTO: 10.9 K/UL
WHITE BLOOD CELLS URINE: 0 /HPF

## 2024-12-27 ENCOUNTER — APPOINTMENT (OUTPATIENT)
Dept: CARDIOLOGY | Facility: CLINIC | Age: 74
End: 2024-12-27
Payer: COMMERCIAL

## 2024-12-27 ENCOUNTER — NON-APPOINTMENT (OUTPATIENT)
Age: 74
End: 2024-12-27

## 2024-12-27 VITALS
SYSTOLIC BLOOD PRESSURE: 138 MMHG | DIASTOLIC BLOOD PRESSURE: 73 MMHG | BODY MASS INDEX: 31.15 KG/M2 | HEIGHT: 72 IN | RESPIRATION RATE: 15 BRPM | OXYGEN SATURATION: 96 % | WEIGHT: 230 LBS

## 2024-12-27 DIAGNOSIS — E78.5 HYPERLIPIDEMIA, UNSPECIFIED: ICD-10-CM

## 2024-12-27 DIAGNOSIS — I10 ESSENTIAL (PRIMARY) HYPERTENSION: ICD-10-CM

## 2024-12-27 DIAGNOSIS — Z95.828 PRESENCE OF OTHER VASCULAR IMPLANTS AND GRAFTS: ICD-10-CM

## 2024-12-27 DIAGNOSIS — Z95.3 PRESENCE OF XENOGENIC HEART VALVE: ICD-10-CM

## 2024-12-27 DIAGNOSIS — I25.10 ATHEROSCLEROTIC HEART DISEASE OF NATIVE CORONARY ARTERY W/OUT ANGINA PECTORIS: ICD-10-CM

## 2024-12-27 PROCEDURE — G2211 COMPLEX E/M VISIT ADD ON: CPT

## 2024-12-27 PROCEDURE — 93000 ELECTROCARDIOGRAM COMPLETE: CPT

## 2024-12-27 PROCEDURE — 99214 OFFICE O/P EST MOD 30 MIN: CPT

## 2025-01-30 ENCOUNTER — RX RENEWAL (OUTPATIENT)
Age: 75
End: 2025-01-30

## 2025-01-31 ENCOUNTER — APPOINTMENT (OUTPATIENT)
Dept: UROLOGY | Facility: CLINIC | Age: 75
End: 2025-01-31
Payer: COMMERCIAL

## 2025-01-31 ENCOUNTER — NON-APPOINTMENT (OUTPATIENT)
Age: 75
End: 2025-01-31

## 2025-01-31 ENCOUNTER — OUTPATIENT (OUTPATIENT)
Dept: OUTPATIENT SERVICES | Facility: HOSPITAL | Age: 75
LOS: 1 days | End: 2025-01-31
Payer: COMMERCIAL

## 2025-01-31 ENCOUNTER — APPOINTMENT (OUTPATIENT)
Dept: ULTRASOUND IMAGING | Facility: IMAGING CENTER | Age: 75
End: 2025-01-31
Payer: COMMERCIAL

## 2025-01-31 VITALS — SYSTOLIC BLOOD PRESSURE: 127 MMHG | DIASTOLIC BLOOD PRESSURE: 72 MMHG | HEART RATE: 106 BPM

## 2025-01-31 DIAGNOSIS — N13.8 BENIGN PROSTATIC HYPERPLASIA WITH LOWER URINARY TRACT SYMPMS: ICD-10-CM

## 2025-01-31 DIAGNOSIS — N50.819 TESTICULAR PAIN, UNSPECIFIED: ICD-10-CM

## 2025-01-31 DIAGNOSIS — N34.2 OTHER URETHRITIS: ICD-10-CM

## 2025-01-31 DIAGNOSIS — R39.12 POOR URINARY STREAM: ICD-10-CM

## 2025-01-31 DIAGNOSIS — R30.0 DYSURIA: ICD-10-CM

## 2025-01-31 DIAGNOSIS — R39.15 URGENCY OF URINATION: ICD-10-CM

## 2025-01-31 DIAGNOSIS — M45.1 ANKYLOSING SPONDYLITIS OF OCCIPITO-ATLANTO-AXIAL REGION: ICD-10-CM

## 2025-01-31 DIAGNOSIS — R33.9 RETENTION OF URINE, UNSPECIFIED: ICD-10-CM

## 2025-01-31 DIAGNOSIS — N30.90 CYSTITIS, UNSPECIFIED W/OUT HEMATURIA: ICD-10-CM

## 2025-01-31 DIAGNOSIS — Z98.890 OTHER SPECIFIED POSTPROCEDURAL STATES: Chronic | ICD-10-CM

## 2025-01-31 DIAGNOSIS — N45.4 ABSCESS OF EPIDIDYMIS OR TESTIS: ICD-10-CM

## 2025-01-31 DIAGNOSIS — N45.1 EPIDIDYMITIS: ICD-10-CM

## 2025-01-31 DIAGNOSIS — N40.1 BENIGN PROSTATIC HYPERPLASIA WITH LOWER URINARY TRACT SYMPMS: ICD-10-CM

## 2025-01-31 DIAGNOSIS — N50.811 RIGHT TESTICULAR PAIN: ICD-10-CM

## 2025-01-31 DIAGNOSIS — R32 UNSPECIFIED URINARY INCONTINENCE: ICD-10-CM

## 2025-01-31 DIAGNOSIS — R82.90 UNSPECIFIED ABNORMAL FINDINGS IN URINE: ICD-10-CM

## 2025-01-31 DIAGNOSIS — R35.0 FREQUENCY OF MICTURITION: ICD-10-CM

## 2025-01-31 PROCEDURE — 99214 OFFICE O/P EST MOD 30 MIN: CPT

## 2025-01-31 PROCEDURE — 93975 VASCULAR STUDY: CPT | Mod: 26

## 2025-01-31 PROCEDURE — 51798 US URINE CAPACITY MEASURE: CPT

## 2025-01-31 PROCEDURE — 93975 VASCULAR STUDY: CPT

## 2025-01-31 RX ORDER — LACOSAMIDE 200 MG/1
TABLET ORAL
Refills: 0 | Status: ACTIVE | COMMUNITY

## 2025-01-31 RX ORDER — NAPROXEN 500 MG/1
500 TABLET ORAL
Qty: 20 | Refills: 0 | Status: ACTIVE | COMMUNITY
Start: 2025-01-31 | End: 1900-01-01

## 2025-02-02 LAB
APPEARANCE: ABNORMAL
BACTERIA: ABNORMAL /HPF
BILIRUBIN URINE: NEGATIVE
BLOOD URINE: ABNORMAL
CAST: 0 /LPF
COLOR: NORMAL
EPITHELIAL CELLS: 2 /HPF
GLUCOSE QUALITATIVE U: 100 MG/DL
KETONES URINE: NEGATIVE MG/DL
LEUKOCYTE ESTERASE URINE: ABNORMAL
MICROSCOPIC-UA: NORMAL
NITRITE URINE: POSITIVE
PH URINE: 5.5
PROTEIN URINE: 100 MG/DL
RED BLOOD CELLS URINE: 696 /HPF
REVIEW: NORMAL
SPECIFIC GRAVITY URINE: 1.02
UROBILINOGEN URINE: 1 MG/DL
WHITE BLOOD CELLS URINE: 646 /HPF

## 2025-02-03 ENCOUNTER — NON-APPOINTMENT (OUTPATIENT)
Age: 75
End: 2025-02-03

## 2025-02-03 DIAGNOSIS — A49.8 OTHER BACTERIAL INFECTIONS OF UNSPECIFIED SITE: ICD-10-CM

## 2025-02-03 LAB — BACTERIA UR CULT: ABNORMAL

## 2025-02-03 RX ORDER — DOXYCYCLINE 100 MG/1
100 CAPSULE ORAL
Qty: 28 | Refills: 0 | Status: COMPLETED | COMMUNITY
Start: 2025-01-31 | End: 2025-02-03

## 2025-02-03 RX ORDER — CIPROFLOXACIN HYDROCHLORIDE 500 MG/1
500 TABLET, FILM COATED ORAL TWICE DAILY
Qty: 20 | Refills: 0 | Status: ACTIVE | COMMUNITY
Start: 2025-02-03 | End: 1900-01-01

## 2025-02-04 ENCOUNTER — APPOINTMENT (OUTPATIENT)
Dept: UROLOGY | Facility: CLINIC | Age: 75
End: 2025-02-04

## 2025-02-04 LAB — URINE CYTOLOGY: NORMAL

## 2025-02-07 LAB
MYCOPLASMA HOMINIS CULTURE: NEGATIVE
UREAPLASMA CULTURE: NEGATIVE

## 2025-03-04 ENCOUNTER — APPOINTMENT (OUTPATIENT)
Dept: UROLOGY | Facility: CLINIC | Age: 75
End: 2025-03-04
Payer: COMMERCIAL

## 2025-03-04 VITALS
HEART RATE: 61 BPM | OXYGEN SATURATION: 100 % | BODY MASS INDEX: 31.02 KG/M2 | WEIGHT: 229 LBS | SYSTOLIC BLOOD PRESSURE: 130 MMHG | HEIGHT: 72 IN | DIASTOLIC BLOOD PRESSURE: 76 MMHG

## 2025-03-04 DIAGNOSIS — N13.8 BENIGN PROSTATIC HYPERPLASIA WITH LOWER URINARY TRACT SYMPMS: ICD-10-CM

## 2025-03-04 DIAGNOSIS — Z87.440 PERSONAL HISTORY OF URINARY (TRACT) INFECTIONS: ICD-10-CM

## 2025-03-04 DIAGNOSIS — A49.8 OTHER BACTERIAL INFECTIONS OF UNSPECIFIED SITE: ICD-10-CM

## 2025-03-04 DIAGNOSIS — Z87.898 PERSONAL HISTORY OF OTHER SPECIFIED CONDITIONS: ICD-10-CM

## 2025-03-04 DIAGNOSIS — N40.1 BENIGN PROSTATIC HYPERPLASIA WITH LOWER URINARY TRACT SYMPMS: ICD-10-CM

## 2025-03-04 DIAGNOSIS — Z87.438 PERSONAL HISTORY OF OTHER DISEASES OF MALE GENITAL ORGANS: ICD-10-CM

## 2025-03-04 DIAGNOSIS — N50.811 RIGHT TESTICULAR PAIN: ICD-10-CM

## 2025-03-04 DIAGNOSIS — N50.819 TESTICULAR PAIN, UNSPECIFIED: ICD-10-CM

## 2025-03-04 PROCEDURE — 99214 OFFICE O/P EST MOD 30 MIN: CPT

## 2025-03-04 PROCEDURE — G2211 COMPLEX E/M VISIT ADD ON: CPT

## 2025-03-08 PROBLEM — N50.819 TESTICULAR PAIN: Status: RESOLVED | Noted: 2025-01-31 | Resolved: 2025-03-08

## 2025-03-08 PROBLEM — Z87.898 HISTORY OF DYSURIA: Status: RESOLVED | Noted: 2025-01-31 | Resolved: 2025-03-08

## 2025-03-08 PROBLEM — N50.811 RIGHT TESTICULAR PAIN: Status: RESOLVED | Noted: 2025-01-31 | Resolved: 2025-03-08

## 2025-03-08 PROBLEM — Z87.440 HISTORY OF CYSTITIS: Status: RESOLVED | Noted: 2025-01-31 | Resolved: 2025-03-08

## 2025-03-08 PROBLEM — Z87.438 HISTORY OF EPIDIDYMITIS: Status: RESOLVED | Noted: 2025-01-31 | Resolved: 2025-03-08

## 2025-03-08 PROBLEM — N40.1 BPH WITH OBSTRUCTION/LOWER URINARY TRACT SYMPTOMS: Noted: 2023-03-09

## 2025-03-08 PROBLEM — A49.8 ESCHERICHIA COLI (E. COLI) INFECTION: Status: RESOLVED | Noted: 2025-02-03 | Resolved: 2025-03-08

## 2025-03-11 ENCOUNTER — APPOINTMENT (OUTPATIENT)
Dept: INTERNAL MEDICINE | Facility: CLINIC | Age: 75
End: 2025-03-11
Payer: COMMERCIAL

## 2025-03-11 VITALS
WEIGHT: 228 LBS | HEART RATE: 61 BPM | OXYGEN SATURATION: 96 % | DIASTOLIC BLOOD PRESSURE: 74 MMHG | HEIGHT: 72 IN | BODY MASS INDEX: 30.88 KG/M2 | SYSTOLIC BLOOD PRESSURE: 138 MMHG

## 2025-03-11 DIAGNOSIS — E78.5 HYPERLIPIDEMIA, UNSPECIFIED: ICD-10-CM

## 2025-03-11 PROCEDURE — 99214 OFFICE O/P EST MOD 30 MIN: CPT

## 2025-03-11 PROCEDURE — 36415 COLL VENOUS BLD VENIPUNCTURE: CPT

## 2025-03-11 PROCEDURE — G2211 COMPLEX E/M VISIT ADD ON: CPT

## 2025-03-12 LAB
ALBUMIN SERPL ELPH-MCNC: 4.4 G/DL
ALP BLD-CCNC: 64 U/L
ALT SERPL-CCNC: 16 U/L
ANION GAP SERPL CALC-SCNC: 11 MMOL/L
AST SERPL-CCNC: 17 U/L
BASOPHILS # BLD AUTO: 0.02 K/UL
BASOPHILS NFR BLD AUTO: 0.2 %
BILIRUB DIRECT SERPL-MCNC: 0.2 MG/DL
BILIRUB INDIRECT SERPL-MCNC: 0.5 MG/DL
BILIRUB SERPL-MCNC: 0.6 MG/DL
BUN SERPL-MCNC: 18 MG/DL
CALCIUM SERPL-MCNC: 9.3 MG/DL
CHLORIDE SERPL-SCNC: 102 MMOL/L
CHOLEST SERPL-MCNC: 132 MG/DL
CO2 SERPL-SCNC: 25 MMOL/L
CREAT SERPL-MCNC: 0.76 MG/DL
EGFRCR SERPLBLD CKD-EPI 2021: 93 ML/MIN/1.73M2
EOSINOPHIL # BLD AUTO: 0.23 K/UL
EOSINOPHIL NFR BLD AUTO: 2.7 %
ESTIMATED AVERAGE GLUCOSE: 131 MG/DL
GLUCOSE SERPL-MCNC: 100 MG/DL
HBA1C MFR BLD HPLC: 6.2 %
HCT VFR BLD CALC: 45.6 %
HDLC SERPL-MCNC: 33 MG/DL
HGB BLD-MCNC: 14.6 G/DL
IMM GRANULOCYTES NFR BLD AUTO: 0.4 %
LDLC SERPL CALC-MCNC: 76 MG/DL
LYMPHOCYTES # BLD AUTO: 2.53 K/UL
LYMPHOCYTES NFR BLD AUTO: 30.2 %
MAN DIFF?: NORMAL
MCHC RBC-ENTMCNC: 29.5 PG
MCHC RBC-ENTMCNC: 32 G/DL
MCV RBC AUTO: 92.1 FL
MONOCYTES # BLD AUTO: 0.64 K/UL
MONOCYTES NFR BLD AUTO: 7.6 %
NEUTROPHILS # BLD AUTO: 4.94 K/UL
NEUTROPHILS NFR BLD AUTO: 58.9 %
NONHDLC SERPL-MCNC: 100 MG/DL
PLATELET # BLD AUTO: 182 K/UL
POTASSIUM SERPL-SCNC: 4.3 MMOL/L
PROT SERPL-MCNC: 7.2 G/DL
RBC # BLD: 4.95 M/UL
RBC # FLD: 13.2 %
SODIUM SERPL-SCNC: 138 MMOL/L
TRIGL SERPL-MCNC: 133 MG/DL
TSH SERPL-ACNC: 1.05 UIU/ML
WBC # FLD AUTO: 8.39 K/UL

## 2025-03-18 ENCOUNTER — APPOINTMENT (OUTPATIENT)
Dept: UROLOGY | Facility: CLINIC | Age: 75
End: 2025-03-18

## 2025-06-04 ENCOUNTER — APPOINTMENT (OUTPATIENT)
Dept: INTERNAL MEDICINE | Facility: CLINIC | Age: 75
End: 2025-06-04
Payer: COMMERCIAL

## 2025-06-04 ENCOUNTER — CLINICAL ADVICE (OUTPATIENT)
Age: 75
End: 2025-06-04

## 2025-06-04 VITALS
WEIGHT: 231 LBS | BODY MASS INDEX: 31.29 KG/M2 | HEART RATE: 60 BPM | OXYGEN SATURATION: 96 % | SYSTOLIC BLOOD PRESSURE: 143 MMHG | DIASTOLIC BLOOD PRESSURE: 80 MMHG | HEIGHT: 72 IN

## 2025-06-04 DIAGNOSIS — E78.5 HYPERLIPIDEMIA, UNSPECIFIED: ICD-10-CM

## 2025-06-04 LAB
ALBUMIN SERPL ELPH-MCNC: 4.2 G/DL
ALP BLD-CCNC: 57 U/L
ALT SERPL-CCNC: 22 U/L
ANION GAP SERPL CALC-SCNC: 11 MMOL/L
AST SERPL-CCNC: 20 U/L
BASOPHILS # BLD AUTO: 0.01 K/UL
BASOPHILS NFR BLD AUTO: 0.1 %
BILIRUB DIRECT SERPL-MCNC: 0.24 MG/DL
BILIRUB INDIRECT SERPL-MCNC: 0.4 MG/DL
BILIRUB SERPL-MCNC: 0.6 MG/DL
BUN SERPL-MCNC: 20 MG/DL
CALCIUM SERPL-MCNC: 9 MG/DL
CHLORIDE SERPL-SCNC: 101 MMOL/L
CHOLEST SERPL-MCNC: 124 MG/DL
CO2 SERPL-SCNC: 26 MMOL/L
CREAT SERPL-MCNC: 0.77 MG/DL
EGFRCR SERPLBLD CKD-EPI 2021: 93 ML/MIN/1.73M2
EOSINOPHIL # BLD AUTO: 0.17 K/UL
EOSINOPHIL NFR BLD AUTO: 2.4 %
ESTIMATED AVERAGE GLUCOSE: 134 MG/DL
GLUCOSE SERPL-MCNC: 117 MG/DL
HBA1C MFR BLD HPLC: 6.3 %
HCT VFR BLD CALC: 44.2 %
HDLC SERPL-MCNC: 35 MG/DL
HGB BLD-MCNC: 14.5 G/DL
IMM GRANULOCYTES NFR BLD AUTO: 0.4 %
LDLC SERPL-MCNC: 65 MG/DL
LYMPHOCYTES # BLD AUTO: 2.02 K/UL
LYMPHOCYTES NFR BLD AUTO: 28.7 %
MAN DIFF?: NORMAL
MCHC RBC-ENTMCNC: 30.1 PG
MCHC RBC-ENTMCNC: 32.8 G/DL
MCV RBC AUTO: 91.7 FL
MONOCYTES # BLD AUTO: 0.68 K/UL
MONOCYTES NFR BLD AUTO: 9.6 %
NEUTROPHILS # BLD AUTO: 4.14 K/UL
NEUTROPHILS NFR BLD AUTO: 58.8 %
NONHDLC SERPL-MCNC: 89 MG/DL
PLATELET # BLD AUTO: 188 K/UL
POTASSIUM SERPL-SCNC: 4.8 MMOL/L
PROT SERPL-MCNC: 7 G/DL
RBC # BLD: 4.82 M/UL
RBC # FLD: 13.2 %
SODIUM SERPL-SCNC: 137 MMOL/L
TRIGL SERPL-MCNC: 138 MG/DL
TSH SERPL-ACNC: 0.91 UIU/ML
WBC # FLD AUTO: 7.05 K/UL

## 2025-06-04 PROCEDURE — G2211 COMPLEX E/M VISIT ADD ON: CPT

## 2025-06-04 PROCEDURE — 36415 COLL VENOUS BLD VENIPUNCTURE: CPT

## 2025-06-04 PROCEDURE — 99214 OFFICE O/P EST MOD 30 MIN: CPT

## 2025-06-30 ENCOUNTER — APPOINTMENT (OUTPATIENT)
Dept: CARDIOLOGY | Facility: CLINIC | Age: 75
End: 2025-06-30
Payer: COMMERCIAL

## 2025-06-30 ENCOUNTER — NON-APPOINTMENT (OUTPATIENT)
Age: 75
End: 2025-06-30

## 2025-06-30 VITALS
HEART RATE: 52 BPM | SYSTOLIC BLOOD PRESSURE: 130 MMHG | OXYGEN SATURATION: 95 % | BODY MASS INDEX: 31.19 KG/M2 | WEIGHT: 230 LBS | DIASTOLIC BLOOD PRESSURE: 72 MMHG

## 2025-06-30 PROCEDURE — 93000 ELECTROCARDIOGRAM COMPLETE: CPT

## 2025-06-30 PROCEDURE — 93306 TTE W/DOPPLER COMPLETE: CPT

## 2025-06-30 PROCEDURE — 99215 OFFICE O/P EST HI 40 MIN: CPT

## 2025-08-11 ENCOUNTER — APPOINTMENT (OUTPATIENT)
Dept: OTOLARYNGOLOGY | Facility: CLINIC | Age: 75
End: 2025-08-11
Payer: COMMERCIAL

## 2025-08-11 VITALS — BODY MASS INDEX: 31.15 KG/M2 | HEIGHT: 72 IN | WEIGHT: 230 LBS

## 2025-08-11 DIAGNOSIS — R22.1 LOCALIZED SWELLING, MASS AND LUMP, NECK: ICD-10-CM

## 2025-08-11 PROCEDURE — 99204 OFFICE O/P NEW MOD 45 MIN: CPT | Mod: 25

## 2025-08-11 PROCEDURE — 31575 DIAGNOSTIC LARYNGOSCOPY: CPT

## 2025-08-20 ENCOUNTER — OUTPATIENT (OUTPATIENT)
Dept: OUTPATIENT SERVICES | Facility: HOSPITAL | Age: 75
LOS: 1 days | End: 2025-08-20
Payer: COMMERCIAL

## 2025-08-20 ENCOUNTER — APPOINTMENT (OUTPATIENT)
Dept: CT IMAGING | Facility: IMAGING CENTER | Age: 75
End: 2025-08-20

## 2025-08-20 DIAGNOSIS — R22.1 LOCALIZED SWELLING, MASS AND LUMP, NECK: ICD-10-CM

## 2025-08-20 DIAGNOSIS — Z95.2 PRESENCE OF PROSTHETIC HEART VALVE: Chronic | ICD-10-CM

## 2025-08-20 DIAGNOSIS — Z98.890 OTHER SPECIFIED POSTPROCEDURAL STATES: Chronic | ICD-10-CM

## 2025-08-20 PROCEDURE — 70491 CT SOFT TISSUE NECK W/DYE: CPT

## 2025-08-20 PROCEDURE — 70491 CT SOFT TISSUE NECK W/DYE: CPT | Mod: 26

## 2025-09-16 ENCOUNTER — APPOINTMENT (OUTPATIENT)
Dept: UROLOGY | Facility: CLINIC | Age: 75
End: 2025-09-16
Payer: COMMERCIAL

## 2025-09-16 VITALS
OXYGEN SATURATION: 95 % | DIASTOLIC BLOOD PRESSURE: 81 MMHG | HEART RATE: 64 BPM | SYSTOLIC BLOOD PRESSURE: 156 MMHG | RESPIRATION RATE: 17 BRPM

## 2025-09-16 DIAGNOSIS — N13.8 BENIGN PROSTATIC HYPERPLASIA WITH LOWER URINARY TRACT SYMPMS: ICD-10-CM

## 2025-09-16 DIAGNOSIS — N40.1 BENIGN PROSTATIC HYPERPLASIA WITH LOWER URINARY TRACT SYMPMS: ICD-10-CM

## 2025-09-16 DIAGNOSIS — N45.4 ABSCESS OF EPIDIDYMIS OR TESTIS: ICD-10-CM

## 2025-09-16 PROCEDURE — G2211 COMPLEX E/M VISIT ADD ON: CPT

## 2025-09-16 PROCEDURE — 99213 OFFICE O/P EST LOW 20 MIN: CPT

## 2025-09-17 PROBLEM — N45.4: Status: RESOLVED | Noted: 2025-01-31 | Resolved: 2025-09-17

## 2025-09-17 LAB
APPEARANCE: CLEAR
BACTERIA: NEGATIVE /HPF
BILIRUBIN URINE: NEGATIVE
BLOOD URINE: NEGATIVE
CAST: 0 /LPF
COLOR: YELLOW
EPITHELIAL CELLS: 0 /HPF
GLUCOSE QUALITATIVE U: NEGATIVE MG/DL
KETONES URINE: NEGATIVE MG/DL
LEUKOCYTE ESTERASE URINE: ABNORMAL
MICROSCOPIC-UA: NORMAL
NITRITE URINE: NEGATIVE
PH URINE: 7
PROTEIN URINE: NEGATIVE MG/DL
RED BLOOD CELLS URINE: 1 /HPF
SPECIFIC GRAVITY URINE: 1.02
UROBILINOGEN URINE: 1 MG/DL
WHITE BLOOD CELLS URINE: 0 /HPF

## 2025-09-18 LAB — BACTERIA UR CULT: NORMAL
